# Patient Record
Sex: FEMALE | Race: WHITE | NOT HISPANIC OR LATINO | Employment: FULL TIME | URBAN - METROPOLITAN AREA
[De-identification: names, ages, dates, MRNs, and addresses within clinical notes are randomized per-mention and may not be internally consistent; named-entity substitution may affect disease eponyms.]

---

## 2020-07-07 ENCOUNTER — OFFICE VISIT (OUTPATIENT)
Dept: INTERNAL MEDICINE | Facility: CLINIC | Age: 35
End: 2020-07-07
Payer: COMMERCIAL

## 2020-07-07 VITALS
HEIGHT: 65 IN | BODY MASS INDEX: 25.45 KG/M2 | HEART RATE: 100 BPM | TEMPERATURE: 98 F | WEIGHT: 152.75 LBS | SYSTOLIC BLOOD PRESSURE: 100 MMHG | DIASTOLIC BLOOD PRESSURE: 80 MMHG

## 2020-07-07 DIAGNOSIS — Z00.00 ROUTINE GENERAL MEDICAL EXAMINATION AT A HEALTH CARE FACILITY: Primary | ICD-10-CM

## 2020-07-07 DIAGNOSIS — M54.50 LOW BACK PAIN, UNSPECIFIED BACK PAIN LATERALITY, UNSPECIFIED CHRONICITY, UNSPECIFIED WHETHER SCIATICA PRESENT: ICD-10-CM

## 2020-07-07 DIAGNOSIS — M54.9 BACK PAIN, UNSPECIFIED BACK LOCATION, UNSPECIFIED BACK PAIN LATERALITY, UNSPECIFIED CHRONICITY: ICD-10-CM

## 2020-07-07 PROCEDURE — 90732 PNEUMOCOCCAL POLYSACCHARIDE VACCINE 23-VALENT =>2YO SQ IM: ICD-10-PCS | Mod: S$GLB,,, | Performed by: FAMILY MEDICINE

## 2020-07-07 PROCEDURE — 90472 TDAP VACCINE GREATER THAN OR EQUAL TO 7YO IM: ICD-10-PCS | Mod: S$GLB,,, | Performed by: FAMILY MEDICINE

## 2020-07-07 PROCEDURE — 90472 IMMUNIZATION ADMIN EACH ADD: CPT | Mod: S$GLB,,, | Performed by: FAMILY MEDICINE

## 2020-07-07 PROCEDURE — 90471 PNEUMOCOCCAL POLYSACCHARIDE VACCINE 23-VALENT =>2YO SQ IM: ICD-10-PCS | Mod: S$GLB,,, | Performed by: FAMILY MEDICINE

## 2020-07-07 PROCEDURE — 90715 TDAP VACCINE GREATER THAN OR EQUAL TO 7YO IM: ICD-10-PCS | Mod: S$GLB,,, | Performed by: FAMILY MEDICINE

## 2020-07-07 PROCEDURE — 99999 PR PBB SHADOW E&M-NEW PATIENT-LVL V: CPT | Mod: PBBFAC,,, | Performed by: FAMILY MEDICINE

## 2020-07-07 PROCEDURE — 90715 TDAP VACCINE 7 YRS/> IM: CPT | Mod: S$GLB,,, | Performed by: FAMILY MEDICINE

## 2020-07-07 PROCEDURE — 90732 PPSV23 VACC 2 YRS+ SUBQ/IM: CPT | Mod: S$GLB,,, | Performed by: FAMILY MEDICINE

## 2020-07-07 PROCEDURE — 90471 IMMUNIZATION ADMIN: CPT | Mod: S$GLB,,, | Performed by: FAMILY MEDICINE

## 2020-07-07 PROCEDURE — 99385 PR PREVENTIVE VISIT,NEW,18-39: ICD-10-PCS | Mod: S$GLB,25,, | Performed by: FAMILY MEDICINE

## 2020-07-07 PROCEDURE — 99999 PR PBB SHADOW E&M-NEW PATIENT-LVL V: ICD-10-PCS | Mod: PBBFAC,,, | Performed by: FAMILY MEDICINE

## 2020-07-07 PROCEDURE — 99385 PREV VISIT NEW AGE 18-39: CPT | Mod: S$GLB,25,, | Performed by: FAMILY MEDICINE

## 2020-07-07 RX ORDER — GABAPENTIN 300 MG/1
CAPSULE ORAL
COMMUNITY
Start: 2020-05-12 | End: 2022-05-24 | Stop reason: SDUPTHER

## 2020-07-07 RX ORDER — MELOXICAM 15 MG/1
TABLET ORAL
COMMUNITY
Start: 2020-05-12 | End: 2021-10-18

## 2020-07-07 RX ORDER — CYCLOBENZAPRINE HCL 10 MG
TABLET ORAL DAILY PRN
COMMUNITY
Start: 2020-05-12 | End: 2021-12-10 | Stop reason: SDUPTHER

## 2020-07-07 RX ORDER — TRAMADOL HYDROCHLORIDE 50 MG/1
50 TABLET ORAL DAILY PRN
COMMUNITY
End: 2022-05-24

## 2020-07-07 RX ORDER — TOPIRAMATE 50 MG/1
TABLET, FILM COATED ORAL
COMMUNITY
Start: 2020-04-08 | End: 2020-08-04 | Stop reason: SDUPTHER

## 2020-07-07 RX ORDER — PHENTERMINE HYDROCHLORIDE 37.5 MG/1
37.5 TABLET ORAL
COMMUNITY
End: 2020-10-15 | Stop reason: ALTCHOICE

## 2020-07-07 RX ORDER — HYDROCODONE BITARTRATE AND ACETAMINOPHEN 5; 325 MG/1; MG/1
TABLET ORAL
COMMUNITY
Start: 2020-06-11 | End: 2020-08-04 | Stop reason: ALTCHOICE

## 2020-07-07 NOTE — PROGRESS NOTES
Subjective:       Patient ID: Simon Reynolds is a 34 y.o. female.    Chief Complaint: Establish Care and Referral (gyn,)    Subjective:     Simon Reynolds is a 34 y.o. female and is here for a comprehensive physical exam. The patient reports problems - back pain.    Do you take any herbs or supplements that were not prescribed by a doctor? yes  Are you taking calcium supplements? no  Are you taking aspirin daily? no     History:  Any STD's in the past? none    The following portions of the patient's history were reviewed and updated as appropriate: allergies, current medications, past family history, past medical history, past social history, past surgical history and problem list.    Review of Systems  Do you have pain that bothers you in your daily life? Yes - back  Pertinent items are noted in HPI.     2. Patient Counseling:   --Nutrition: Stressed importance of moderation in sodium/caffeine intake, saturated fat and cholesterol, caloric balance, calcium.  --Exercise: Stressed the importance of regular exercise.   --Substance Abuse: Discussed cessation/primary prevention of tobacco, alcohol -  Pt attempting to stop   --Sexuality: Discussed sexually transmitted disease.  --Injury prevention: Discussed safety belts,  smoke detector.   --Dental health: Discussed dental health.  --Immunizations reviewed.    3. Discussed the patient's BMI with her.  The BMI elevated.  4. Follow up as needed for acute illness        Review of Systems   Constitutional: Negative for fever.   HENT: Negative for congestion.    Eyes: Negative for discharge.   Respiratory: Negative for shortness of breath.    Cardiovascular: Negative for chest pain.   Gastrointestinal: Negative for abdominal pain.   Genitourinary: Positive for dysuria and urgency. Negative for frequency and hematuria.   Musculoskeletal: Negative for joint swelling.   Skin: Negative for rash.   Neurological: Negative for dizziness.   Psychiatric/Behavioral: Negative  for agitation.       Objective:      Physical Exam  Vitals signs and nursing note reviewed.   Constitutional:       General: She is not in acute distress.     Appearance: Normal appearance. She is well-developed. She is not diaphoretic.   HENT:      Head: Normocephalic and atraumatic.   Eyes:      General: No scleral icterus.     Conjunctiva/sclera: Conjunctivae normal.   Cardiovascular:      Rate and Rhythm: Normal rate and regular rhythm.   Pulmonary:      Effort: Pulmonary effort is normal. No respiratory distress.      Breath sounds: Normal breath sounds. No wheezing.   Abdominal:      General: Bowel sounds are normal.      Palpations: Abdomen is soft.      Tenderness: There is no abdominal tenderness. There is no guarding.   Skin:     General: Skin is warm.      Coloration: Skin is not pale.      Findings: No erythema or rash.      Comments: Good turgor   Neurological:      Mental Status: She is alert.   Psychiatric:         Mood and Affect: Mood is anxious.         Assessment:       1. Routine general medical examination at a health care facility    2. Back pain, unspecified back location, unspecified back pain laterality, unspecified chronicity    3. Low back pain, unspecified back pain laterality, unspecified chronicity, unspecified whether sciatica present        Plan:     Problem List Items Addressed This Visit        Orthopedic    Back pain    Relevant Orders    Ambulatory referral/consult to Pain Clinic    Ambulatory referral/consult to Neurosurgery       Other    Routine general medical examination at a health care facility - Primary    Relevant Orders    Ambulatory referral/consult to Obstetrics / Gynecology    Pneumococcal Polysaccharide Vaccine (23 Valent) (SQ/IM)    Tdap Vaccine    CBC auto differential    Comprehensive metabolic panel    Hemoglobin A1C    HIV 1/2 Ag/Ab (4th Gen)    Iron and TIBC    Lipid Panel    TSH    Vitamin D

## 2020-07-08 ENCOUNTER — LAB VISIT (OUTPATIENT)
Dept: LAB | Facility: HOSPITAL | Age: 35
End: 2020-07-08
Attending: FAMILY MEDICINE
Payer: COMMERCIAL

## 2020-07-08 DIAGNOSIS — Z00.00 ROUTINE GENERAL MEDICAL EXAMINATION AT A HEALTH CARE FACILITY: ICD-10-CM

## 2020-07-08 PROCEDURE — 85025 COMPLETE CBC W/AUTO DIFF WBC: CPT

## 2020-07-08 PROCEDURE — 82306 VITAMIN D 25 HYDROXY: CPT

## 2020-07-08 PROCEDURE — 36415 COLL VENOUS BLD VENIPUNCTURE: CPT | Mod: PO

## 2020-07-08 PROCEDURE — 83036 HEMOGLOBIN GLYCOSYLATED A1C: CPT

## 2020-07-08 PROCEDURE — 80061 LIPID PANEL: CPT

## 2020-07-08 PROCEDURE — 86703 HIV-1/HIV-2 1 RESULT ANTBDY: CPT

## 2020-07-08 PROCEDURE — 83540 ASSAY OF IRON: CPT

## 2020-07-08 PROCEDURE — 84443 ASSAY THYROID STIM HORMONE: CPT

## 2020-07-08 PROCEDURE — 80053 COMPREHEN METABOLIC PANEL: CPT

## 2020-07-09 LAB
25(OH)D3+25(OH)D2 SERPL-MCNC: 57 NG/ML (ref 30–96)
ALBUMIN SERPL BCP-MCNC: 4.2 G/DL (ref 3.5–5.2)
ALP SERPL-CCNC: 63 U/L (ref 55–135)
ALT SERPL W/O P-5'-P-CCNC: 13 U/L (ref 10–44)
ANION GAP SERPL CALC-SCNC: 9 MMOL/L (ref 8–16)
AST SERPL-CCNC: 17 U/L (ref 10–40)
BASOPHILS # BLD AUTO: 0.05 K/UL (ref 0–0.2)
BASOPHILS NFR BLD: 0.5 % (ref 0–1.9)
BILIRUB SERPL-MCNC: 0.3 MG/DL (ref 0.1–1)
BUN SERPL-MCNC: 18 MG/DL (ref 6–20)
CALCIUM SERPL-MCNC: 10.5 MG/DL (ref 8.7–10.5)
CHLORIDE SERPL-SCNC: 110 MMOL/L (ref 95–110)
CHOLEST SERPL-MCNC: 209 MG/DL (ref 120–199)
CHOLEST/HDLC SERPL: 4.3 {RATIO} (ref 2–5)
CO2 SERPL-SCNC: 19 MMOL/L (ref 23–29)
CREAT SERPL-MCNC: 1.3 MG/DL (ref 0.5–1.4)
DIFFERENTIAL METHOD: ABNORMAL
EOSINOPHIL # BLD AUTO: 0.1 K/UL (ref 0–0.5)
EOSINOPHIL NFR BLD: 0.9 % (ref 0–8)
ERYTHROCYTE [DISTWIDTH] IN BLOOD BY AUTOMATED COUNT: 13.2 % (ref 11.5–14.5)
EST. GFR  (AFRICAN AMERICAN): >60 ML/MIN/1.73 M^2
EST. GFR  (NON AFRICAN AMERICAN): 53.3 ML/MIN/1.73 M^2
ESTIMATED AVG GLUCOSE: 94 MG/DL (ref 68–131)
GLUCOSE SERPL-MCNC: 73 MG/DL (ref 70–110)
HBA1C MFR BLD HPLC: 4.9 % (ref 4–5.6)
HCT VFR BLD AUTO: 44.7 % (ref 37–48.5)
HDLC SERPL-MCNC: 49 MG/DL (ref 40–75)
HDLC SERPL: 23.4 % (ref 20–50)
HGB BLD-MCNC: 13.8 G/DL (ref 12–16)
HIV 1+2 AB+HIV1 P24 AG SERPL QL IA: NEGATIVE
IMM GRANULOCYTES # BLD AUTO: 0.02 K/UL (ref 0–0.04)
IMM GRANULOCYTES NFR BLD AUTO: 0.2 % (ref 0–0.5)
IRON SERPL-MCNC: 107 UG/DL (ref 30–160)
LDLC SERPL CALC-MCNC: 134.6 MG/DL (ref 63–159)
LYMPHOCYTES # BLD AUTO: 2.6 K/UL (ref 1–4.8)
LYMPHOCYTES NFR BLD: 26.9 % (ref 18–48)
MCH RBC QN AUTO: 27 PG (ref 27–31)
MCHC RBC AUTO-ENTMCNC: 30.9 G/DL (ref 32–36)
MCV RBC AUTO: 87 FL (ref 82–98)
MONOCYTES # BLD AUTO: 0.8 K/UL (ref 0.3–1)
MONOCYTES NFR BLD: 7.9 % (ref 4–15)
NEUTROPHILS # BLD AUTO: 6.2 K/UL (ref 1.8–7.7)
NEUTROPHILS NFR BLD: 63.6 % (ref 38–73)
NONHDLC SERPL-MCNC: 160 MG/DL
NRBC BLD-RTO: 0 /100 WBC
PLATELET # BLD AUTO: 309 K/UL (ref 150–350)
PMV BLD AUTO: 11.9 FL (ref 9.2–12.9)
POTASSIUM SERPL-SCNC: 3.9 MMOL/L (ref 3.5–5.1)
PROT SERPL-MCNC: 7.3 G/DL (ref 6–8.4)
RBC # BLD AUTO: 5.12 M/UL (ref 4–5.4)
SATURATED IRON: 30 % (ref 20–50)
SODIUM SERPL-SCNC: 138 MMOL/L (ref 136–145)
TOTAL IRON BINDING CAPACITY: 358 UG/DL (ref 250–450)
TRANSFERRIN SERPL-MCNC: 242 MG/DL (ref 200–375)
TRIGL SERPL-MCNC: 127 MG/DL (ref 30–150)
TSH SERPL DL<=0.005 MIU/L-ACNC: 2 UIU/ML (ref 0.4–4)
WBC # BLD AUTO: 9.68 K/UL (ref 3.9–12.7)

## 2020-07-10 NOTE — PROGRESS NOTES
Please call pt with abnormal results. Pt does not need appt at this time, unless they have questions or wish to further discuss.  Chol mildly elevated  Change diet to decrease fat and red meats and substitute with leaner cuts of chicken, and /or fish.  Re-check labs in 3 months, if not under control, we can start a statin drug.

## 2020-07-13 ENCOUNTER — TELEPHONE (OUTPATIENT)
Dept: NEUROSURGERY | Facility: CLINIC | Age: 35
End: 2020-07-13

## 2020-07-13 NOTE — TELEPHONE ENCOUNTER
Called pt in reference to appt on tomorrow, pt will need to be reschedule d/t pt doesn't have MRI//ns

## 2020-07-14 ENCOUNTER — TELEPHONE (OUTPATIENT)
Dept: NEUROSURGERY | Facility: CLINIC | Age: 35
End: 2020-07-14

## 2020-07-14 ENCOUNTER — OFFICE VISIT (OUTPATIENT)
Dept: NEUROSURGERY | Facility: CLINIC | Age: 35
End: 2020-07-14
Payer: COMMERCIAL

## 2020-07-14 ENCOUNTER — OFFICE VISIT (OUTPATIENT)
Dept: OBSTETRICS AND GYNECOLOGY | Facility: CLINIC | Age: 35
End: 2020-07-14
Payer: COMMERCIAL

## 2020-07-14 VITALS
BODY MASS INDEX: 26.38 KG/M2 | HEIGHT: 65 IN | SYSTOLIC BLOOD PRESSURE: 98 MMHG | DIASTOLIC BLOOD PRESSURE: 68 MMHG | WEIGHT: 158.31 LBS

## 2020-07-14 VITALS
DIASTOLIC BLOOD PRESSURE: 72 MMHG | WEIGHT: 158 LBS | HEART RATE: 88 BPM | SYSTOLIC BLOOD PRESSURE: 102 MMHG | BODY MASS INDEX: 26.33 KG/M2 | RESPIRATION RATE: 16 BRPM | HEIGHT: 65 IN

## 2020-07-14 DIAGNOSIS — Z00.00 ROUTINE GENERAL MEDICAL EXAMINATION AT A HEALTH CARE FACILITY: ICD-10-CM

## 2020-07-14 DIAGNOSIS — N92.1 MENORRHAGIA WITH IRREGULAR CYCLE: ICD-10-CM

## 2020-07-14 DIAGNOSIS — G96.191 TARLOV CYST: ICD-10-CM

## 2020-07-14 DIAGNOSIS — Z01.419 ENCOUNTER FOR GYNECOLOGICAL EXAMINATION WITHOUT ABNORMAL FINDING: Primary | ICD-10-CM

## 2020-07-14 DIAGNOSIS — N83.202 LEFT OVARIAN CYST: ICD-10-CM

## 2020-07-14 DIAGNOSIS — R30.0 DYSURIA: ICD-10-CM

## 2020-07-14 DIAGNOSIS — M54.50 LOW BACK PAIN, UNSPECIFIED BACK PAIN LATERALITY, UNSPECIFIED CHRONICITY, UNSPECIFIED WHETHER SCIATICA PRESENT: ICD-10-CM

## 2020-07-14 DIAGNOSIS — R33.9 URINARY RETENTION: ICD-10-CM

## 2020-07-14 DIAGNOSIS — M51.36 DEGENERATIVE DISC DISEASE, LUMBAR: Primary | ICD-10-CM

## 2020-07-14 PROCEDURE — 99999 PR PBB SHADOW E&M-EST. PATIENT-LVL III: CPT | Mod: PBBFAC,,, | Performed by: NURSE PRACTITIONER

## 2020-07-14 PROCEDURE — 81002 URINALYSIS NONAUTO W/O SCOPE: CPT | Mod: S$GLB,,, | Performed by: NURSE PRACTITIONER

## 2020-07-14 PROCEDURE — 99999 PR PBB SHADOW E&M-EST. PATIENT-LVL III: ICD-10-PCS | Mod: PBBFAC,,, | Performed by: NURSE PRACTITIONER

## 2020-07-14 PROCEDURE — 87491 CHLMYD TRACH DNA AMP PROBE: CPT

## 2020-07-14 PROCEDURE — 99204 PR OFFICE/OUTPT VISIT, NEW, LEVL IV, 45-59 MIN: ICD-10-PCS | Mod: S$GLB,,, | Performed by: NEUROLOGICAL SURGERY

## 2020-07-14 PROCEDURE — 99385 PR PREVENTIVE VISIT,NEW,18-39: ICD-10-PCS | Mod: 25,S$GLB,, | Performed by: NURSE PRACTITIONER

## 2020-07-14 PROCEDURE — 81002 PR URINALYSIS NONAUTO W/O SCOPE: ICD-10-PCS | Mod: S$GLB,,, | Performed by: NURSE PRACTITIONER

## 2020-07-14 PROCEDURE — 99999 PR PBB SHADOW E&M-EST. PATIENT-LVL III: ICD-10-PCS | Mod: PBBFAC,,, | Performed by: NEUROLOGICAL SURGERY

## 2020-07-14 PROCEDURE — 99385 PREV VISIT NEW AGE 18-39: CPT | Mod: 25,S$GLB,, | Performed by: NURSE PRACTITIONER

## 2020-07-14 PROCEDURE — 88175 CYTOPATH C/V AUTO FLUID REDO: CPT

## 2020-07-14 PROCEDURE — 99204 OFFICE O/P NEW MOD 45 MIN: CPT | Mod: S$GLB,,, | Performed by: NEUROLOGICAL SURGERY

## 2020-07-14 PROCEDURE — 99999 PR PBB SHADOW E&M-EST. PATIENT-LVL III: CPT | Mod: PBBFAC,,, | Performed by: NEUROLOGICAL SURGERY

## 2020-07-14 RX ORDER — OXYCODONE AND ACETAMINOPHEN 5; 325 MG/1; MG/1
TABLET ORAL
COMMUNITY
Start: 2020-07-09 | End: 2021-10-18

## 2020-07-14 NOTE — PATIENT INSTRUCTIONS
Breast Health: Breast Self-Awareness  What is breast self-awareness?  Breast self-awareness is knowing how your breasts normally look and feel. Your breasts change as you go through different stages of your life. So its important to learn what is normal for your breasts. Breast self-awareness helps you notice any changes in your breasts right away. Report any changes to your healthcare provider.  Why is breast self-awareness important?  Many experts now say that women should focus on breast self-awareness instead of doing a breast self-examination (BSE). These experts include the American Cancer Society, the U.S. Preventive Services Task Force, and the American Congress of Obstetricians and Gynecologists. Some experts even advise not teaching women to do a BSE. Thats because research hasnt shown a clear benefit to doing BSEs.  Breast self-awareness is different than a BSE. Breast self-awareness isnt about following a certain method and schedule. Its about knowing what's normal for your breasts. That way you can notice even small changes right away. If you see any changes, report them to your healthcare provider.  Changes to look for  Call your healthcare provider if you find any changes in your breasts that concern you. These changes may include:  · A lump  · Nipple discharge other than breast milk, especially a bloody discharge  · Swelling  · A change in size or shape  · Skin irritation, such as redness, thickening, or dimpling of the skin  · Swollen lymph nodes in the armpit  · Nipple problems, such as pain or redness  If you find a lump  Contact your provider if you find lumpiness in one breast, feel something different in the tissue, or feel a definite lump. Sometimes lumpiness may be due to menstrual changes. But there may be reason for concern.  Your provider may want to see you right away if you have:  · Nipple discharge that is bloody  · Skin changes on your breast, such as dimpling or puckering  Its  normal to be upset if you find a lump. But its important to contact your provider right away. Remember that most breast lumps are benign. This means they are not cancer.  Date Last Reviewed: 8/10/2015  © 9140-5086 [employer]. [address] [place] [address]. All rights reserved. This information is not intended as a substitute for professional medical care. Always follow your healthcare professional's instructions.        Clinical Breast Exam    Many health organizations recommend a yearly clinical breast exam. This exam may be done by a gynecologist, family healthcare provider, nurse practitioner, or specially trained nurse. Yearly breast exams help to make sure that breast conditions are found early.  Your healthcare providers role  A healthcare professional knows the tests and follow-up care needed if a problem is found. Your clinical exam is also a great time to ask questions about breast self-exams. You can find out if youre checking your breasts in the best way. Or you may want to ask how pregnancy, breast implants, or breast reduction surgery affect the way you should check your breasts.  Diagnostic tests  If a clinical exam reveals a breast change, you may have other tests to find out more. These tests may include:  · Mammography. A low-dose X-ray of your breast tissue.  · Ultrasound. An imaging test that uses sound waves to create images of your breast.  · Biopsy. A small amount of breast tissue is removed by needle or by a cut (incision). The tissue is then checked under a microscope.  Guidelines for having clinical breast exams  The American College of Obstetricians and Gynecologists recommends that starting at age 29, you should have a clinical breast exam every 1 to 3 years. After age 40, have a clinical breast exam each year. If youre at higher risk for breast cancer, you may need exams more often. Risk factors for breast cancer may include:  · Being over 50 or  postmenopausal  · Having a family history of breast cancer  · Having the BRCA1 or BRCA2 gene mutation or certain other gene mutations  · Having more menstrual periods due to starting menstruation early (before age 12) or having a late menopause (after age 55)  · Having no pregnancies  · Having a first pregnancy after age 30  · Being obese  · Having a history of radiation treatment to your chest area  · Exposure to MELISSA during your mother's pregnancy  · Not being active  · Drinking too much alcohol  · Having dense breast tissue  · Taking hormone therapy after menopause  Other health organizations have different recommendations. Talk to your healthcare provider about what is best for you.   Date Last Reviewed: 8/13/2015 © 2000-2017 [employer]. [address] [place] [address], [place] [address]. All rights reserved. This information is not intended as a substitute for professional medical care. Always follow your healthcare professional's instructions.        Heavy Menstrual Bleeding    Heavy menstrual bleeding means that your periods are heavier or longer than usual. You may soak through a pad or tampon every 1 to 3 hours on the heaviest days of your period. You may also pass large, dark clots. And your periods may last longer than 7 days.  If you have heavy periods often, this can cause a problem called anemia. With anemia, your red blood cell count is too low. Red blood cells are needed because they help carry oxygen throughout your body. Severe anemia may cause you to look pale and feel weak or tired. You might also become short of breath easily.  There are many possible causes of heavy menstrual bleeding. Hormonal imbalance is the most common cause. Having benign growths in your uterus, such as fibroids or polyps, is another cause. Taking certain medicines or having certain health problems or bleeding disorders are also causes.  To treat heavy menstrual bleeding, medicines are often tried first. If these  dont help, further testing and treatments will likely be needed.  Home care  Medicines  If youre prescribed medicines, be sure to take them as directed.  · To help control heavy bleeding, any of the following may be used:  ¨ Hormone therapy (this includes all methods of hormonal birth control such as pills, shots, cream, ring, patch, or hormone-releasing IUD)  ¨ Nonsteroidal anti-inflammatory drugs (NSAIDs), such as ibuprofen  ¨ Antifibrinolytic medicines, such as transexamic acid  · To help treat anemia, iron supplements may be prescribed.            General care  · Get plenty of rest if you tire easily. Avoid heavy exertion.  · To help relieve pain or cramping, try using a heating pad on the lower belly or back. A warm bath may also help.  Follow-up care  Follow up with your healthcare provider as directed.  When to seek medical advice  Call your healthcare provider right away if any of these occur:  · Heavier bleeding (soaking 1 pad or tampon every hour for 3 hours)  · Heavy bleeding that lasts longer than 1 week  · Fever of 100.4ºF (38ºC) or higher, or as directed by your provider  · Pain or cramping that gets worse instead of better  · Signs of anemia such as pale skin, extreme fatigue or weakness, or shortness of breath  · Dizziness or fainting  Date Last Reviewed: 6/11/2015  © 6051-0668 [email or web address]. [address] [place] [address], [place] [address]. All rights reserved. This information is not intended as a substitute for professional medical care. Always follow your healthcare professional's instructions.        Ovarian Cysts    Ovarian cysts are sacs filled with fluid or tissue that form on or inside the ovaries. The ovaries are two small organs located on each side of a womans uterus (womb). They are part of the female reproductive system.  Ovarian cysts are common in women, especially during childbearing years. There are different types of cysts. Most are harmless (benign) and go away on their  own. They often cause no symptoms. If symptoms do occur, they can include mild pain or pressure in the lower belly (abdomen).  Cysts that are large or break (rupture) may cause more severe pain and symptoms. In these cases, hospital care or treatment such as surgery may be needed. More extensive treatment may also be needed if a cyst causes an ovary to twist (called torsion) or if a cyst is suspected to be cancerous. Keep in mind that most cysts are not cancerous, however.  General care  · To help relieve pain, your healthcare provider may recommend using over-the-counter pain medicine. If needed, stronger pain medicine may be prescribed.  · Depending on the type of cyst you have, your healthcare provider may advise taking birth control pills. These help shrink cysts in certain cases. They may also help prevent new cysts from forming. Be sure to take these medicines as directed if they are prescribed.  · Your healthcare provider may advise you to watch your symptoms over time to see if they go away or worsen. Regular ultrasound tests may also be advised. These can help check if a cyst goes away or grows in size.  Follow-up care  Follow up with your healthcare provider, or as advised.  When to seek medical advice  Call your healthcare provider right away if any of these occur:  · Pain worsens or fails to get better with home treatment  · Fever of 100.4°F (38°C) or higher (or other fever amount directed by your healthcare provider)  · Nausea and vomiting  · Weakness, dizziness, or fainting  · Abnormal vaginal bleeding  Date Last Reviewed: 6/11/2015  © 4724-2516 [employer]. [address] [place] [address], [place] [address]. All rights reserved. This information is not intended as a substitute for professional medical care. Always follow your healthcare professional's instructions.        Understanding Ovarian Cysts  An ovarian cyst is a fluid-filled sac that forms on or inside an ovary. The ovaries are a pair of  small, oval-shaped organs in the lower part of a womans belly (abdomen). About once a month, one of the ovaries releases an egg. The ovaries also make the hormones estrogen and progesterone. These hormones are part of pregnancy, the menstrual cycle, and breast growth.  Ovarian cysts are very common in women of all ages. Young girls can also get them, but this is less common. There are different types of ovarian cysts. They can occur for various reasons, and they may need different treatments. A cyst can vary in size from half an inch to more than 4 inches.  Types of ovarian cysts  There are different types of ovarian cysts:  Functional cyst  This is the most common type of ovarian cyst. They only occur in women who havent gone through menopause. There are 2 types of functional cysts:  · Follicular cyst. This cyst happens when an egg isnt released and it keeps growing inside the ovary.  · Corpus luteum cyst. This type of cyst occurs when the sac around the egg doesnt dissolve after the egg is released.  Endometrioma  This is a cyst filled with old blood and tissue from the lining of the uterus. They are often called chocolate cysts because of their dark color. They can happen in women with endometriosis.  Dermoid cyst  This cyst develops from ovarian cells and eggs. They may have hair, skin, or fat in them. These cysts are common in women of childbearing age.  What causes ovarian cysts?  Cysts can also be caused by:  · Polycystic ovary syndrome (PCOS), a condition that causes multiple cysts on the ovaries  · Pregnancy  · Severe pelvic infection, such as chlamydia  · Noncancerous growths  · Cancer (rare)  · Using fertility medicine to cause ovulation, such as clomiphene  Symptoms of an ovarian cyst  Many women dont have any symptoms from the cyst. In women with symptoms, the most common is pain or pressure in your lower belly on the side of the cyst. This pain may be dull or sharp, and it may come and go. A cyst  that breaks open (ruptures) may lead to sudden, sharp pain.  Other symptoms of an ovarian cyst can include:  · Pain in the lower back or thighs  · Trouble emptying your bladder fully  · Pain during sex  · Weight gain  · Pain during your period  · Breast tenderness  · Abnormal vaginal bleeding (rare)  Diagnosing an ovarian cyst  Your primary care doctor or an obstetrics and gynecology (OB/GYN) doctor may diagnose the condition. Your doctor will ask about your health history and your symptoms. You will also have a physical exam. This will likely include a pelvic exam. During the pelvic exam, your doctor may feel the swelling on your ovary. In women with no symptoms, this is often the first sign of a cyst.  If your doctor thinks you may have an ovarian cyst, you may need tests. These can help your doctor learn the type of cyst. Tests can also help rule out other problems, such as an ectopic pregnancy. The tests may include:  · Ultrasound. This test uses sound waves to view the size, shape, and location of the cyst. The test can also show if the growth is solid or filled with fluid.  · MRI. This uses large magnets and a computer to create a detailed picture of the area.  · Pregnancy test. This is done to check if pregnancy may be the cause of the cyst.  · Blood tests. These check for hormone problems and cancer. They also check if the cyst is bleeding.  · Biopsy. This is a test where a tiny piece of the ovary is taken. The piece is examined in a lab for cancer cells. This may be done if an ultrasound shows a certain type of growth on the ovary.  Date Last Reviewed: 5/6/2015 © 2000-2017 [employer]. [address] [place] [address], [place] [address]. All rights reserved. This information is not intended as a substitute for professional medical care. Always follow your healthcare professional's instructions.        The Range of Pap Test Results  When your Pap test is sent to the lab, the lab studies your cell  samples and reports any abnormal cell changes. Your health care provider can discuss these changes with you. In some cases, an abnormal Pap test is due to an infection. More serious cell changes range from dysplasia to cancer. Talk to your health care provider about your Pap test.    Normal results  Cervical cells, even normal ones, are always changing. As they mature, normal squamous cells move from deeper layers within the cervix. Over time, these cells flatten and cover the surface of the cervix. Within the cervical canal, the cells are different. These glandular cells are taller and not as flat as the cells on the surface of the cervix. When a Pap test sample shows healthy cells of both types, the results are negative. Keep having Pap tests as often as directed.  Abnormal results  A positive Pap test result means some cells in the sample showed abnormal changes. These results are grouped by the type of cell change and the location, or extent, of the changes. Depending on the results, you may need further testing.  · Inflammation: Noncancerous changes are present. They may be due to normal cell repair. Or, they may be caused by an infection, such as HPV or yeast. Further testing may be needed. (Also called reactive cellular changes.)  · Atypical squamous cells: Test results are unclear. Cells on the surface of the cervix show changes, but their significance is not yet known. Testing for HPV and other sexually transmitted infections (STIs) may be needed. Treatment may be required. (Reported as ASC-US or ASC-H.)  · Atypical glandular cells: Cells lining the cervical canal show abnormal changes. Further testing is likely. You may also have treatment to destroy or remove problem cells. (Reported as AGC.)  · Mild dysplasia: Cells show distinct changes. More testing or HPV typing may be done. You may also have treatment to destroy or remove problem cells. (Reported as low-grade LA or DELIA 1.)  · Moderate to severe  dysplasia: Cells show precancerous changes. Or, noninvasive cancer (carcinoma in situ) may be present. Treatment to destroy or remove problem cells is likely. (Reported as high-grade LA or DELIA 2 or DELIA 3.)  · Cancer: Different types of cancer may be detected by your Pap test. More tests to assess the cancer's extent are likely. The type of treatment will depend on the test results and other factors, such as age and health history. (Reported as squamous cell carcinoma, endocervical adenocarcinoma in situ, or adenocarcinoma.)  Date Last Reviewed: 5/12/2015  © 6330-8146 [employer]. [address] [place] [address], [place] [address]. All rights reserved. This information is not intended as a substitute for professional medical care. Always follow your healthcare professional's instructions.

## 2020-07-14 NOTE — LETTER
July 14, 2020      Darion Cross MD  [address] [address] [address]           [facility]  [address] [facility] [address]  Phone: [phone]  Fax: [phone]          Patient: Simon Reynolds   MR Number: [id number]   YOB: 1985   Date of Visit: 7/14/2020       Dear Dr. Darion Cross:    Thank you for referring Simon Reynolds to me for evaluation. Attached you will find relevant portions of my assessment and plan of care.    If you have questions, please do not hesitate to call me. I look forward to following Simon Reynolds along with you.    Sincerely,    Walter Mendez MD    Enclosure  CC:  No Recipients    If you would like to receive this communication electronically, please contact [email or web address] or [phone] to request more information on [employer] access.    For providers and/or their staff who would like to refer a patient to [facility], please contact us through our one-stop-shop provider referral line, [facility] , at [phone].    If you feel you have received this communication in error or would no longer like to receive these types of communications, please e-mail [email or web address]

## 2020-07-14 NOTE — PROGRESS NOTES
CC: Well woman exam    Simon Reynolds is a 34 y.o. female  presents for well woman exam.  LMP: Patient's last menstrual period was 2020..    Numerous complaints most unrelated to gyn -  Has a back injury from work from 2019  Had urinary issues that started with this injury that has yet to be addressed- she states she always feels some retention and difficulty with urination  Gyn related does have heavy cycles and pain which she states has always had but has seemed to worsen  She had an MRI of her back and showed a cyst to left ovary on 2020 - pt concerned this is causing her urine issues  States can not take ocp due to use of Topamax for her epilepsy - is considering BTL         Past Medical History:   Diagnosis Date    Breast mass     Epilepsy     History of alcohol abuse     History of drug abuse     Migraine     Ovarian cyst     Scoliosis     Spine curvature, acquired     Spine disorder      Past Surgical History:   Procedure Laterality Date    EPIDURAL STEROID INJECTION      REPAIR OF HIATAL HERNIA USING MESH      SURGICAL REMOVAL OF MASS OF UPPER EXTREMITY      right breast     Social History     Socioeconomic History    Marital status:      Spouse name: 2    Number of children: Not on file    Years of education: Not on file    Highest education level: Not on file   Occupational History    Occupation: industrial shipping    Social Needs    Financial resource strain: Not on file    Food insecurity     Worry: Not on file     Inability: Not on file    Transportation needs     Medical: Not on file     Non-medical: Not on file   Tobacco Use    Smoking status: Current Every Day Smoker     Types: Cigarettes    Smokeless tobacco: Never Used   Substance and Sexual Activity    Alcohol use: Not Currently     Comment: sober 6 years    Drug use: Not Currently     Types: Benzodiazepines, Methamphetamines     Comment: recovery 6 years    Sexual activity: Yes      "Partners: Male     Birth control/protection: None   Lifestyle    Physical activity     Days per week: Not on file     Minutes per session: Not on file    Stress: Not on file   Relationships    Social connections     Talks on phone: Not on file     Gets together: Not on file     Attends [employer] service: Not on file     Active member of club or organization: Not on file     Attends meetings of clubs or organizations: Not on file     Relationship status: Not on file   Other Topics Concern    Not on file   Social History Narrative    Daughter of current patient maryuri Reynolds, she recently moved here after being hurt on job in [facility]     Family History   Problem Relation Age of Onset    Alcohol abuse Mother     Drug abuse Mother     Cancer Mother         bilat breast     OB History        4    Para   2    Term   2            AB   2    Living   2       SAB   1    TAB        Ectopic        Multiple        Live Births   2                 BP 98/68   Ht 5' 5" (1.651 m)   Wt 71.8 kg (158 lb 4.6 oz)   LMP 2020   BMI 26.34 kg/m²       ROS:  GENERAL: Denies weight gain or weight loss. Feeling well overall.   SKIN: Denies rash or lesions.   HEAD: Denies head injury or headache.   NODES: Denies enlarged lymph nodes.   CHEST: Denies chest pain or shortness of breath.   CARDIOVASCULAR: Denies palpitations or left sided chest pain.   ABDOMEN: No abdominal pain, constipation, diarrhea, nausea, vomiting or rectal bleeding.   URINARY: No frequency, dysuria, hematuria, or burning on urination.  REPRODUCTIVE: See HPI.   BREASTS: The patient performs breast self-examination and denies pain, lumps, or nipple discharge.   HEMATOLOGIC: No easy bruisability or excessive bleeding.   MUSCULOSKELETAL: Denies joint pain or swelling.   NEUROLOGIC: Denies syncope or weakness.   PSYCHIATRIC: Denies depression, anxiety or mood swings.    PHYSICAL EXAM:  APPEARANCE: Well nourished, well developed, in no acute " distress.  AFFECT: WNL, alert and oriented x 3  SKIN: No acne or hirsutism  NECK: Neck symmetric without masses or thyromegaly  NODES: No inguinal, cervical, axillary, or femoral lymph node enlargement  CHEST: Good respiratory effect  ABDOMEN: Soft.  No tenderness or masses.  No hepatosplenomegaly.  No hernias.  BREASTS: Symmetrical, no skin changes or visible lesions.  No palpable masses, nipple discharge bilaterally.  PELVIC: Normal external genitalia without lesions.  Normal hair distribution.  Adequate perineal body, normal urethral meatus.  Vagina moist and well rugated without lesions or discharge.  Cervix pink, without lesions, discharge or tenderness.  No significant cystocele or rectocele.  Bimanual exam shows uterus to be normal size, regular, mobile and nontender.  Adnexa without masses or tenderness.    EXTREMITIES: No edema.  Physical Exam    1. Encounter for gynecological examination without abnormal finding  Liquid-Based Pap Smear, Screening   2. Routine general medical examination at a health care facility  Ambulatory referral/consult to Obstetrics / Gynecology   3. Dysuria  POCT urine dipstick without microscope   4. Urinary retention  POCT urine dipstick without microscope   5. Left ovarian cyst  US Pelvis Comp with Transvag NON-OB (xpd   6. Menorrhagia with irregular cycle  US Pelvis Comp with Transvag NON-OB (xpd    AND PLAN:    Patient was counseled today on A.C.S. Pap guidelines and recommendations for yearly pelvic exams, mammograms and monthly self breast exams; to see her PCP for other health maintenance.     Labs reviewed from July 2020 - normal  Check pelvic u/s   UA dip negative in office today  Will have her f/u for her bleeding issues and wants to discuss BTL  With gyn MD

## 2020-07-14 NOTE — PROGRESS NOTES
Subjective:      Patient ID: Simon Reynolds is a 34 y.o. female.    Chief Complaint: Lumbar Spine Pain (L-Spine) (LBP )    Patient here for eval of lower back pain   Since October of last year she has had event causing acute onset of back pain   Pain currently 5/10 across back   Ambulates unassisted   No Physical therapy for this   Takes Percocet and Tramadol for symptom relief   No previous spine surgery   Had an MIKE x 2 without any relief   She states that she is currently involved in a workman's comp case for this which is involved in legal case for    Is scheduled for an SI joint injection by her pain physician  MR of lumbar spine showed likely ovarian cyst which is being worked up for today in another department   MRI of the lumbar spine also made mention of a Tarlov cyst and she wanted to have an evaluation by neurosurgeon to make sure this was not surgical  Denies any pain numbness or tingling or any weakness down the legs        Review of Systems   Constitutional: Negative for activity change, appetite change and chills.   HENT: Negative for hearing loss, sore throat and tinnitus.    Eyes: Negative for pain, discharge and itching.   Cardiovascular: Negative for chest pain.   Gastrointestinal: Negative for abdominal pain.   Endocrine: Negative for cold intolerance and heat intolerance.   Genitourinary: Negative for difficulty urinating and dysuria.   Musculoskeletal: Positive for back pain and gait problem.   Allergic/Immunologic: Negative for environmental allergies.   Neurological: Positive for weakness. Negative for dizziness, tremors, light-headedness and headaches.   Hematological: Negative for adenopathy.   Psychiatric/Behavioral: Negative for agitation, behavioral problems and confusion.         Objective:       Neurosurgery Physical Exam  Ortho/SPM Exam    Nursing note and vitals reviewed  Gen:Oriented to person, place, and time.             Appears stated age   Skin: no rashes or lesions  identified   Head:Normocephalic and atraumatic.  Nose: Nose normal.    Eyes: EOM are normal. Pupils are equal, round, and reactive to light.   Neck: Neck supple. No masses or lesions palpated  Cardiovascular: Intact distal pulses.    Abdominal: Soft.   Neurological: Alert and oriented to person, place, and time.  No cranial nerve deficit.  Coordination normal. Normal muscle tone  Psychiatric: Normal mood and affect. Behavior is normal.      Back:  None  Paraspinal muscle spasms   None  Pain with flexion and extention   WNL  Range of motion    Neg  Straight leg raise     Motor   Right Right Left Left  Level Group   5  5  L2 Hip flexor (Psoas)   5  5  L3 Leg extension (Quads)   5  5  L4 Dorsiflexion & foot inversion (Tibialis Anterior)   5  5  L5 Great toe extension ( EHL)   5  5  S1 Foot eversion (Gastroc, PL & PB)     Sensation  NL Decreased (R/L/BL) Level Sensation    X  L2 Anterio-medial thigh   X  L3 Medial thigh around knee   X  L4 Medial foot   X  L5 Dorsum foot   X  S1 Lateral foot     Reflex  2+  Patellar tendon (L4)   2+  Achilles tendon (S1)     MRI shows Tarlov cyst at the level approximately T12  There are mild degenerative changes however no major foraminal or central stenosis lumbar spine is identified    The MRI of the cervical spine shows mild straightening of the cervical curve.  There is no major central or foraminal cervical stenosis identified    There is mention of a ovarian cyst which again she is being worked up for with an ultrasound today  .  Assessment:     1. Degenerative disc disease, lumbar    2. Low back pain, unspecified back pain laterality, unspecified chronicity, unspecified whether sciatica present    3. Tarlov cyst      Plan:     Low back pain, unspecified back pain laterality, unspecified chronicity, unspecified whether sciatica present      Patient is mild degenerative changes into the lumbar spine  Incidental finding of Tarlov cyst  Explained this is not surgical  No  neurosurgical recommendations at this time  Can start outpatient physical therapy for range of motion and strengthening of the lumbar spine    Thank you for the referral   Please call with any questions    Walter Mendez MD  Neurosurgery     Disclaimer: This note was prepared using a voice recognition system and is likely to have sound alike errors within the text.

## 2020-07-14 NOTE — LETTER
July 14, 2020      Darion Cross MD  [address] [address] [address]           [facility]  [address] [facility] [address]  Phone: [phone]  Fax: [phone]          Patient: Simon Reynolds   MR Number: [id number]   YOB: 1985   Date of Visit: 7/14/2020       Dear Dr. Darion Cross:    Thank you for referring Simon Reynolds to me for evaluation. Attached you will find relevant portions of my assessment and plan of care.    If you have questions, please do not hesitate to call me. I look forward to following Simon Reynolds along with you.    Sincerely,    Stacey Zurita, NP    Enclosure  CC:  No Recipients    If you would like to receive this communication electronically, please contact [email or web address] or [phone] to request more information on [facility] access.    For providers and/or their staff who would like to refer a patient to [facility], please contact us through our one-stop-shop provider referral line, [facility] , at [phone].    If you feel you have received this communication in error or would no longer like to receive these types of communications, please e-mail [email or web address]

## 2020-07-15 ENCOUNTER — HOSPITAL ENCOUNTER (OUTPATIENT)
Dept: RADIOLOGY | Facility: HOSPITAL | Age: 35
Discharge: HOME | End: 2020-07-15
Attending: NURSE PRACTITIONER
Payer: COMMERCIAL

## 2020-07-15 DIAGNOSIS — N83.202 LEFT OVARIAN CYST: ICD-10-CM

## 2020-07-15 DIAGNOSIS — N92.1 MENORRHAGIA WITH IRREGULAR CYCLE: ICD-10-CM

## 2020-07-15 PROCEDURE — 76830 TRANSVAGINAL US NON-OB: CPT | Mod: 26,,, | Performed by: RADIOLOGY

## 2020-07-15 PROCEDURE — 76830 TRANSVAGINAL US NON-OB: CPT | Mod: TC,PO

## 2020-07-15 PROCEDURE — 76856 US EXAM PELVIC COMPLETE: CPT | Mod: 26,,, | Performed by: RADIOLOGY

## 2020-07-15 PROCEDURE — 76856 US PELVIS COMP WITH TRANSVAG NON-OB (XPD): ICD-10-PCS | Mod: 26,,, | Performed by: RADIOLOGY

## 2020-07-15 PROCEDURE — 76830 US PELVIS COMP WITH TRANSVAG NON-OB (XPD): ICD-10-PCS | Mod: 26,,, | Performed by: RADIOLOGY

## 2020-07-16 LAB
C TRACH DNA SPEC QL NAA+PROBE: NOT DETECTED
N GONORRHOEA DNA SPEC QL NAA+PROBE: NOT DETECTED

## 2020-07-23 LAB
FINAL PATHOLOGIC DIAGNOSIS: NORMAL
Lab: NORMAL

## 2020-08-04 ENCOUNTER — OFFICE VISIT (OUTPATIENT)
Dept: INTERNAL MEDICINE | Facility: CLINIC | Age: 35
End: 2020-08-04
Payer: COMMERCIAL

## 2020-08-04 ENCOUNTER — LAB VISIT (OUTPATIENT)
Dept: LAB | Facility: HOSPITAL | Age: 35
End: 2020-08-04
Attending: FAMILY MEDICINE
Payer: COMMERCIAL

## 2020-08-04 VITALS
WEIGHT: 155.44 LBS | DIASTOLIC BLOOD PRESSURE: 76 MMHG | SYSTOLIC BLOOD PRESSURE: 122 MMHG | TEMPERATURE: 98 F | BODY MASS INDEX: 26.54 KG/M2 | HEIGHT: 64 IN | HEART RATE: 80 BPM

## 2020-08-04 DIAGNOSIS — E66.3 OVERWEIGHT (BMI 25.0-29.9): ICD-10-CM

## 2020-08-04 DIAGNOSIS — Z11.59 ENCOUNTER FOR HEPATITIS C VIRUS SCREENING TEST FOR HIGH RISK PATIENT: ICD-10-CM

## 2020-08-04 DIAGNOSIS — M54.50 LOW BACK PAIN, UNSPECIFIED BACK PAIN LATERALITY, UNSPECIFIED CHRONICITY, UNSPECIFIED WHETHER SCIATICA PRESENT: Primary | ICD-10-CM

## 2020-08-04 DIAGNOSIS — G43.909 MIGRAINE WITHOUT STATUS MIGRAINOSUS, NOT INTRACTABLE, UNSPECIFIED MIGRAINE TYPE: ICD-10-CM

## 2020-08-04 DIAGNOSIS — Z91.89 ENCOUNTER FOR HEPATITIS C VIRUS SCREENING TEST FOR HIGH RISK PATIENT: ICD-10-CM

## 2020-08-04 PROCEDURE — 99999 PR PBB SHADOW E&M-EST. PATIENT-LVL IV: CPT | Mod: PBBFAC,,, | Performed by: FAMILY MEDICINE

## 2020-08-04 PROCEDURE — 99999 PR PBB SHADOW E&M-EST. PATIENT-LVL IV: ICD-10-PCS | Mod: PBBFAC,,, | Performed by: FAMILY MEDICINE

## 2020-08-04 PROCEDURE — 36415 COLL VENOUS BLD VENIPUNCTURE: CPT | Mod: PO

## 2020-08-04 PROCEDURE — 86803 HEPATITIS C AB TEST: CPT

## 2020-08-04 PROCEDURE — 99214 PR OFFICE/OUTPT VISIT, EST, LEVL IV, 30-39 MIN: ICD-10-PCS | Mod: S$GLB,,, | Performed by: FAMILY MEDICINE

## 2020-08-04 PROCEDURE — 99214 OFFICE O/P EST MOD 30 MIN: CPT | Mod: S$GLB,,, | Performed by: FAMILY MEDICINE

## 2020-08-04 RX ORDER — TOPIRAMATE 50 MG/1
50 TABLET, FILM COATED ORAL DAILY
Qty: 90 TABLET | Refills: 0 | Status: SHIPPED | OUTPATIENT
Start: 2020-08-04 | End: 2020-08-27

## 2020-08-04 NOTE — PROGRESS NOTES
Subjective:       Patient ID: Simon Reynolds is a 34 y.o. female.    Chief Complaint: Back Pain (follow up )    Back Pain  This is a chronic problem. The current episode started more than 1 year ago. The problem occurs constantly. The problem has been gradually worsening since onset. The pain is present in the lumbar spine. The quality of the pain is described as aching, cramping, stabbing and burning. The pain radiates to the left foot and right foot. The pain is severe. The symptoms are aggravated by bending, position and twisting. Pertinent negatives include no abdominal pain or chest pain. Treatments tried: pain meds, muscle relaxers.     Review of Systems   Respiratory: Negative for shortness of breath.    Cardiovascular: Negative for chest pain.   Gastrointestinal: Negative for abdominal pain.   Musculoskeletal: Positive for arthralgias and back pain.       Objective:      Physical Exam  Vitals signs and nursing note reviewed.   Constitutional:       General: She is not in acute distress.     Appearance: Normal appearance. She is well-developed. She is not diaphoretic.   HENT:      Head: Normocephalic and atraumatic.      Nose: Nose normal.   Pulmonary:      Effort: Pulmonary effort is normal. No respiratory distress.      Breath sounds: Normal breath sounds. No wheezing.   Skin:     General: Skin is warm and dry.      Findings: No erythema or rash.   Neurological:      Mental Status: She is alert.   Psychiatric:         Mood and Affect: Mood normal.         Behavior: Behavior normal.         Thought Content: Thought content normal.         Judgment: Judgment normal.         Assessment:       1. Low back pain, unspecified back pain laterality, unspecified chronicity, unspecified whether sciatica present    2. Encounter for hepatitis C virus screening test for high risk patient    3. Overweight (BMI 25.0-29.9)    4. Migraine without status migrainosus, not intractable, unspecified migraine type        Plan:      Problem List Items Addressed This Visit        Neuro    Migraines    Relevant Medications    topiramate (TOPAMAX) 50 MG tablet       ID    Encounter for hepatitis C virus screening test for high risk patient    Relevant Orders    Hepatitis C Antibody       Endocrine    Overweight (BMI 25.0-29.9)    Current Assessment & Plan     Do not eat starches. (nothing white)  Stop sugary snacks,  Stop bottled juices, or sodas.  See if you can lose any weight by stopping these items first, then we will re-visit the issue.  Glycemic Index Diet Handout given to pt with explanation.              Orthopedic    Back pain - Primary    Relevant Orders    Ambulatory referral/consult to Neurosurgery

## 2020-08-05 LAB — HCV AB SERPL QL IA: POSITIVE

## 2020-08-06 DIAGNOSIS — R76.8 HEPATITIS C ANTIBODY TEST POSITIVE: Primary | ICD-10-CM

## 2020-08-06 NOTE — PROGRESS NOTES
Please call pt with abnormal results. Pt does not need appt at this time, unless they have questions or wish to further discuss.  Will place GI ref.

## 2020-08-07 ENCOUNTER — TELEPHONE (OUTPATIENT)
Dept: GASTROENTEROLOGY | Facility: CLINIC | Age: 35
End: 2020-08-07

## 2020-08-07 NOTE — TELEPHONE ENCOUNTER
----- Message from Sasha Rockwell sent at 8/7/2020 10:13 AM CDT -----  Regarding: pt  Contact: pt  Returning a call to office for an appt.   Pt was referred to gastro by Dr Cross.  Pt lab work came back positive for hep C but she states she has the antiobodies and not the virus. Pt can be reached at [phone].

## 2020-08-27 ENCOUNTER — TELEPHONE (OUTPATIENT)
Dept: OBSTETRICS AND GYNECOLOGY | Facility: CLINIC | Age: 35
End: 2020-08-27

## 2020-08-27 DIAGNOSIS — G43.909 MIGRAINE WITHOUT STATUS MIGRAINOSUS, NOT INTRACTABLE, UNSPECIFIED MIGRAINE TYPE: ICD-10-CM

## 2020-08-27 RX ORDER — TOPIRAMATE 100 MG/1
100 TABLET, FILM COATED ORAL 2 TIMES DAILY
Qty: 180 TABLET | Refills: 0 | Status: SHIPPED | OUTPATIENT
Start: 2020-08-27 | End: 2020-10-14 | Stop reason: SDUPTHER

## 2020-08-27 RX ORDER — TOPIRAMATE 50 MG/1
50 TABLET, FILM COATED ORAL 2 TIMES DAILY
Qty: 180 TABLET | Refills: 0 | Status: CANCELLED | OUTPATIENT
Start: 2020-08-27

## 2020-08-27 NOTE — TELEPHONE ENCOUNTER
Called patient and rescheduled appointment at her request. Informed her of date time and location. Pt. Voiced understanding.

## 2020-08-27 NOTE — TELEPHONE ENCOUNTER
Spoke with patient states that she is need of a refill on Topamax. Rx for Topamax 50 mg was sent to pharmacy on 8/4 for 90 tablets. Pt states that she is currently out. Pt states that she takes 150 mg in the morning and 150 mg at night. Please advise.

## 2020-08-27 NOTE — TELEPHONE ENCOUNTER
----- Message from Suzanne Rucker sent at 8/27/2020  3:06 PM CDT -----  Regarding: Appt  Contact: [phone]   Would like to consult with the nurse, patient missed her Appt with her Ultrasound, patient would like to reschedule her Appt to be seen in the office, please call back , thanks sj

## 2020-09-22 ENCOUNTER — OFFICE VISIT (OUTPATIENT)
Dept: OBSTETRICS AND GYNECOLOGY | Facility: CLINIC | Age: 35
End: 2020-09-22
Payer: COMMERCIAL

## 2020-09-22 VITALS
DIASTOLIC BLOOD PRESSURE: 72 MMHG | SYSTOLIC BLOOD PRESSURE: 118 MMHG | BODY MASS INDEX: 27.09 KG/M2 | WEIGHT: 157.88 LBS

## 2020-09-22 DIAGNOSIS — D21.9 FIBROIDS: Primary | ICD-10-CM

## 2020-09-22 PROCEDURE — 3008F BODY MASS INDEX DOCD: CPT | Mod: CPTII,S$GLB,, | Performed by: OBSTETRICS & GYNECOLOGY

## 2020-09-22 PROCEDURE — 99999 PR PBB SHADOW E&M-EST. PATIENT-LVL III: ICD-10-PCS | Mod: PBBFAC,,, | Performed by: OBSTETRICS & GYNECOLOGY

## 2020-09-22 PROCEDURE — 99999 PR PBB SHADOW E&M-EST. PATIENT-LVL III: CPT | Mod: PBBFAC,,, | Performed by: OBSTETRICS & GYNECOLOGY

## 2020-09-22 PROCEDURE — 99213 PR OFFICE/OUTPT VISIT, EST, LEVL III, 20-29 MIN: ICD-10-PCS | Mod: S$GLB,,, | Performed by: OBSTETRICS & GYNECOLOGY

## 2020-09-22 PROCEDURE — 3008F PR BODY MASS INDEX (BMI) DOCUMENTED: ICD-10-PCS | Mod: CPTII,S$GLB,, | Performed by: OBSTETRICS & GYNECOLOGY

## 2020-09-22 PROCEDURE — 99213 OFFICE O/P EST LOW 20 MIN: CPT | Mod: S$GLB,,, | Performed by: OBSTETRICS & GYNECOLOGY

## 2020-09-22 NOTE — PROGRESS NOTES
Subjective:       Patient ID: Simon Reynolds is a 34 y.o. female.    Chief Complaint:  Follow-up      History of Present Illness  HPI  Presents to follow-up pelvic ultrasound.  Pt saw Stacey Jones this summer for [employer], and mentioned that a MRI to evaluate a back injury showed a lime sized cyst on her left ovary.  Stacey ordered an ultrasound, which shows normal ovaries bilateraly and 2 small uterine fibroids (2.4cm and 1.1cm).  At that visit, she had been having irregular, heavy periods, but the last several months, her periods are now monthly.  They last 2-3 days.  Has some heavy flow the first 1-2 days, but otherwise normal flow.  Also has pelvic pressure.  Has constant low back pain that gets worse during her period, but thinks this is more related to her back injury.  Continues to follow-up with her doctor in [place] for that.  Has been having issues with her bladder, and plans to see a urologist soon.      GYN & OB History  Patient's last menstrual period was 2020 (approximate).   Date of Last Pap: No result found    OB History    Para Term  AB Living   4 2 2   2 2   SAB TAB Ectopic Multiple Live Births   1       2      # Outcome Date GA Lbr Sen/2nd Weight Sex Delivery Anes PTL Lv   4 AB 2008           3 Term 2006    M  OTHER, None  TAMEKA   2 SAB 2005           1 Term 2004    M Vag-Spont OTHER, EPI  TAMEKA       Review of Systems  Review of Systems   Constitutional: Negative for fatigue, fever and unexpected weight change.   Gastrointestinal: Negative for abdominal pain, constipation, diarrhea, nausea and vomiting.   Genitourinary: Positive for dysmenorrhea. Negative for dysuria, frequency, menorrhagia, menstrual problem, urgency, vaginal bleeding, vaginal discharge, vaginal pain, postcoital bleeding and vaginal odor.   Musculoskeletal: Positive for back pain.           Objective:    Physical Exam:   Constitutional: She is oriented to person, place, and time. She appears well-developed and  well-nourished. No distress.                           Neurological: She is alert and oriented to person, place, and time.    Skin: No rash noted. No erythema. No pallor.    Psychiatric: She has a normal mood and affect. Her behavior is normal. Judgment and thought content normal.          Assessment:        1. Fibroids                Plan:      Simon was seen today for follow-up.    Diagnoses and all orders for this visit:    Fibroids    Reviewed normal ovaries on ultrasound.  Cyst seen on MRI no longer present.  Discussed pathophysiology of fibroids.  Hers are very small and are asymptomatic and not causing any problems for her at this time.  Advised her to continue to follow-up with her doctor regarding her back injury, and keep f/u with urology.  RTC prn.

## 2020-10-14 RX ORDER — TOPIRAMATE 100 MG/1
50 TABLET, FILM COATED ORAL 2 TIMES DAILY
Qty: 90 TABLET | Refills: 1 | Status: SHIPPED | OUTPATIENT
Start: 2020-10-14 | End: 2020-10-15 | Stop reason: SDUPTHER

## 2020-10-14 NOTE — TELEPHONE ENCOUNTER
----- Message from Gin Mcclain sent at 10/14/2020 10:08 AM CDT -----  Regarding: Refill  Pt is calling to speak with Staff regarding a medication refill of topiramate (TOPAMAX) 100 MG tablet, twice daily.    She can be reached at [phone].    Thank you.

## 2020-10-15 ENCOUNTER — OFFICE VISIT (OUTPATIENT)
Dept: INTERNAL MEDICINE | Facility: CLINIC | Age: 35
End: 2020-10-15
Payer: COMMERCIAL

## 2020-10-15 VITALS
SYSTOLIC BLOOD PRESSURE: 124 MMHG | HEART RATE: 80 BPM | HEIGHT: 64 IN | TEMPERATURE: 98 F | WEIGHT: 155.44 LBS | DIASTOLIC BLOOD PRESSURE: 84 MMHG | RESPIRATION RATE: 20 BRPM | BODY MASS INDEX: 26.54 KG/M2

## 2020-10-15 DIAGNOSIS — K59.03 THERAPEUTIC OPIOID-INDUCED CONSTIPATION (OIC): ICD-10-CM

## 2020-10-15 DIAGNOSIS — G43.909 MIGRAINE WITHOUT STATUS MIGRAINOSUS, NOT INTRACTABLE, UNSPECIFIED MIGRAINE TYPE: Primary | ICD-10-CM

## 2020-10-15 DIAGNOSIS — T40.2X5A THERAPEUTIC OPIOID-INDUCED CONSTIPATION (OIC): ICD-10-CM

## 2020-10-15 DIAGNOSIS — R76.8 HEPATITIS C ANTIBODY TEST POSITIVE: ICD-10-CM

## 2020-10-15 DIAGNOSIS — M54.50 LOW BACK PAIN, UNSPECIFIED BACK PAIN LATERALITY, UNSPECIFIED CHRONICITY, UNSPECIFIED WHETHER SCIATICA PRESENT: ICD-10-CM

## 2020-10-15 DIAGNOSIS — Z91.89 ENCOUNTER FOR HEPATITIS C VIRUS SCREENING TEST FOR HIGH RISK PATIENT: ICD-10-CM

## 2020-10-15 DIAGNOSIS — Z11.59 ENCOUNTER FOR HEPATITIS C VIRUS SCREENING TEST FOR HIGH RISK PATIENT: ICD-10-CM

## 2020-10-15 DIAGNOSIS — M54.9 BACK PAIN, UNSPECIFIED BACK LOCATION, UNSPECIFIED BACK PAIN LATERALITY, UNSPECIFIED CHRONICITY: ICD-10-CM

## 2020-10-15 DIAGNOSIS — E53.8 B12 DEFICIENCY: ICD-10-CM

## 2020-10-15 PROBLEM — K59.00 CONSTIPATION: Status: ACTIVE | Noted: 2020-10-15

## 2020-10-15 PROCEDURE — 3008F PR BODY MASS INDEX (BMI) DOCUMENTED: ICD-10-PCS | Mod: CPTII,S$GLB,, | Performed by: FAMILY MEDICINE

## 2020-10-15 PROCEDURE — 99214 OFFICE O/P EST MOD 30 MIN: CPT | Mod: 25,S$GLB,, | Performed by: FAMILY MEDICINE

## 2020-10-15 PROCEDURE — 99999 PR PBB SHADOW E&M-EST. PATIENT-LVL V: ICD-10-PCS | Mod: PBBFAC,,, | Performed by: FAMILY MEDICINE

## 2020-10-15 PROCEDURE — 96372 THER/PROPH/DIAG INJ SC/IM: CPT | Mod: S$GLB,,, | Performed by: FAMILY MEDICINE

## 2020-10-15 PROCEDURE — 99214 PR OFFICE/OUTPT VISIT, EST, LEVL IV, 30-39 MIN: ICD-10-PCS | Mod: 25,S$GLB,, | Performed by: FAMILY MEDICINE

## 2020-10-15 PROCEDURE — 96372 PR INJECTION,THERAP/PROPH/DIAG2ST, IM OR SUBCUT: ICD-10-PCS | Mod: S$GLB,,, | Performed by: FAMILY MEDICINE

## 2020-10-15 PROCEDURE — 99999 PR PBB SHADOW E&M-EST. PATIENT-LVL V: CPT | Mod: PBBFAC,,, | Performed by: FAMILY MEDICINE

## 2020-10-15 PROCEDURE — 3008F BODY MASS INDEX DOCD: CPT | Mod: CPTII,S$GLB,, | Performed by: FAMILY MEDICINE

## 2020-10-15 RX ORDER — TOPIRAMATE 100 MG/1
50 TABLET, FILM COATED ORAL 2 TIMES DAILY
Qty: 90 TABLET | Refills: 1 | Status: SHIPPED | OUTPATIENT
Start: 2020-10-15 | End: 2021-09-23

## 2020-10-15 RX ORDER — CYANOCOBALAMIN 1000 UG/ML
1000 INJECTION, SOLUTION INTRAMUSCULAR; SUBCUTANEOUS
Status: COMPLETED | OUTPATIENT
Start: 2020-10-15 | End: 2020-10-15

## 2020-10-15 RX ORDER — TOPIRAMATE 100 MG/1
50 TABLET, FILM COATED ORAL 2 TIMES DAILY
Qty: 90 TABLET | Refills: 1 | Status: CANCELLED | OUTPATIENT
Start: 2020-10-15 | End: 2021-04-13

## 2020-10-15 RX ADMIN — CYANOCOBALAMIN 1000 MCG: 1000 INJECTION, SOLUTION INTRAMUSCULAR; SUBCUTANEOUS at 09:10

## 2020-10-15 NOTE — PROGRESS NOTES
Subjective:       Patient ID: Simon Reynolds is a 34 y.o. female.    Chief Complaint: Hyperlipidemia (3 month follow up )    History of b12 def, uses sublingual b12 currently    Headache   This is a chronic problem. The current episode started more than 1 year ago. The problem occurs constantly. The problem has been gradually worsening. The pain is located in the occipital region. The pain does not radiate. The pain quality is not similar to prior headaches. The quality of the pain is described as aching. The pain is moderate. Associated symptoms include nausea, phonophobia, photophobia, scalp tenderness and weakness. Pertinent negatives include no abdominal pain, fever, hearing loss or tingling. The symptoms are aggravated by bright light and noise. Treatments tried: topamax. The treatment provided moderate relief.     Review of Systems   Constitutional: Negative for fever.   HENT: Negative for hearing loss.    Eyes: Positive for photophobia.   Respiratory: Negative for shortness of breath.    Cardiovascular: Negative for chest pain.   Gastrointestinal: Positive for nausea. Negative for abdominal pain.   Neurological: Positive for weakness and headaches. Negative for tingling.       Objective:      Physical Exam  Vitals signs and nursing note reviewed.   Constitutional:       General: She is not in acute distress.     Appearance: Normal appearance. She is well-developed. She is not diaphoretic.   HENT:      Head: Normocephalic and atraumatic.      Nose: Nose normal.   Pulmonary:      Effort: Pulmonary effort is normal. No respiratory distress.      Breath sounds: Normal breath sounds. No wheezing.   Skin:     General: Skin is warm and dry.      Findings: No erythema or rash.   Neurological:      General: No focal deficit present.      Mental Status: She is alert and oriented to person, place, and time. Mental status is at baseline.   Psychiatric:         Mood and Affect: Mood normal.         Behavior: Behavior  normal.         Thought Content: Thought content normal.         Judgment: Judgment normal.         Assessment:       1. Migraine without status migrainosus, not intractable, unspecified migraine type    2. Low back pain, unspecified back pain laterality, unspecified chronicity, unspecified whether sciatica present    3. Encounter for hepatitis C virus screening test for high risk patient    4. Hepatitis C antibody test positive    5. Back pain, unspecified back location, unspecified back pain laterality, unspecified chronicity    6. Therapeutic opioid-induced constipation (OIC)    7. B12 deficiency        Plan:     Problem List Items Addressed This Visit        Neuro    Migraines - Primary    Relevant Medications    topiramate (TOPAMAX) 100 MG tablet    Other Relevant Orders    Ambulatory referral/consult to Neurology       ID    RESOLVED: Encounter for hepatitis C virus screening test for high risk patient       Immunology/Multi System    Hepatitis C antibody test positive    Relevant Orders    Ambulatory referral/consult to Gastroenterology       Endocrine    B12 deficiency    Relevant Medications    cyanocobalamin injection 1,000 mcg (Start on 10/15/2020  9:30 AM)       GI    Therapeutic opioid-induced constipation (OIC)    Relevant Medications    linaCLOtide (LINZESS) 72 mcg Cap capsule       Orthopedic    Back pain    Current Assessment & Plan     Pt saw abdon Redd second opinion         Relevant Orders    Ambulatory referral/consult to Pain Clinic    Ambulatory referral/consult to Orthopedics

## 2020-10-21 ENCOUNTER — PATIENT OUTREACH (OUTPATIENT)
Dept: ADMINISTRATIVE | Facility: OTHER | Age: 35
End: 2020-10-21

## 2021-04-28 ENCOUNTER — PATIENT MESSAGE (OUTPATIENT)
Dept: RESEARCH | Facility: HOSPITAL | Age: 36
End: 2021-04-28

## 2021-07-16 ENCOUNTER — TELEPHONE (OUTPATIENT)
Dept: INTERNAL MEDICINE | Facility: CLINIC | Age: 36
End: 2021-07-16

## 2021-09-20 DIAGNOSIS — G43.909 MIGRAINE WITHOUT STATUS MIGRAINOSUS, NOT INTRACTABLE, UNSPECIFIED MIGRAINE TYPE: ICD-10-CM

## 2021-09-20 RX ORDER — TOPIRAMATE 100 MG/1
TABLET, FILM COATED ORAL
Qty: 90 TABLET | Refills: 1 | OUTPATIENT
Start: 2021-09-20

## 2021-10-17 ENCOUNTER — PATIENT OUTREACH (OUTPATIENT)
Dept: ADMINISTRATIVE | Facility: OTHER | Age: 36
End: 2021-10-17

## 2021-10-18 ENCOUNTER — PATIENT MESSAGE (OUTPATIENT)
Dept: OBSTETRICS AND GYNECOLOGY | Facility: CLINIC | Age: 36
End: 2021-10-18

## 2021-10-18 ENCOUNTER — OFFICE VISIT (OUTPATIENT)
Dept: OBSTETRICS AND GYNECOLOGY | Facility: CLINIC | Age: 36
End: 2021-10-18
Payer: COMMERCIAL

## 2021-10-18 ENCOUNTER — TELEPHONE (OUTPATIENT)
Dept: RADIOLOGY | Facility: HOSPITAL | Age: 36
End: 2021-10-18

## 2021-10-18 VITALS
WEIGHT: 162.25 LBS | SYSTOLIC BLOOD PRESSURE: 114 MMHG | HEIGHT: 64 IN | DIASTOLIC BLOOD PRESSURE: 80 MMHG | BODY MASS INDEX: 27.7 KG/M2

## 2021-10-18 DIAGNOSIS — N92.0 MENORRHAGIA WITH REGULAR CYCLE: Primary | ICD-10-CM

## 2021-10-18 PROCEDURE — 1160F RVW MEDS BY RX/DR IN RCRD: CPT | Mod: CPTII,S$GLB,, | Performed by: NURSE PRACTITIONER

## 2021-10-18 PROCEDURE — 3074F SYST BP LT 130 MM HG: CPT | Mod: CPTII,S$GLB,, | Performed by: NURSE PRACTITIONER

## 2021-10-18 PROCEDURE — 1160F PR REVIEW ALL MEDS BY PRESCRIBER/CLIN PHARMACIST DOCUMENTED: ICD-10-PCS | Mod: CPTII,S$GLB,, | Performed by: NURSE PRACTITIONER

## 2021-10-18 PROCEDURE — 99999 PR PBB SHADOW E&M-EST. PATIENT-LVL III: CPT | Mod: PBBFAC,,, | Performed by: NURSE PRACTITIONER

## 2021-10-18 PROCEDURE — 3008F PR BODY MASS INDEX (BMI) DOCUMENTED: ICD-10-PCS | Mod: CPTII,S$GLB,, | Performed by: NURSE PRACTITIONER

## 2021-10-18 PROCEDURE — 3008F BODY MASS INDEX DOCD: CPT | Mod: CPTII,S$GLB,, | Performed by: NURSE PRACTITIONER

## 2021-10-18 PROCEDURE — 1159F PR MEDICATION LIST DOCUMENTED IN MEDICAL RECORD: ICD-10-PCS | Mod: CPTII,S$GLB,, | Performed by: NURSE PRACTITIONER

## 2021-10-18 PROCEDURE — 99999 PR PBB SHADOW E&M-EST. PATIENT-LVL III: ICD-10-PCS | Mod: PBBFAC,,, | Performed by: NURSE PRACTITIONER

## 2021-10-18 PROCEDURE — 99214 OFFICE O/P EST MOD 30 MIN: CPT | Mod: S$GLB,,, | Performed by: NURSE PRACTITIONER

## 2021-10-18 PROCEDURE — 3079F PR MOST RECENT DIASTOLIC BLOOD PRESSURE 80-89 MM HG: ICD-10-PCS | Mod: CPTII,S$GLB,, | Performed by: NURSE PRACTITIONER

## 2021-10-18 PROCEDURE — 1159F MED LIST DOCD IN RCRD: CPT | Mod: CPTII,S$GLB,, | Performed by: NURSE PRACTITIONER

## 2021-10-18 PROCEDURE — 3079F DIAST BP 80-89 MM HG: CPT | Mod: CPTII,S$GLB,, | Performed by: NURSE PRACTITIONER

## 2021-10-18 PROCEDURE — 3074F PR MOST RECENT SYSTOLIC BLOOD PRESSURE < 130 MM HG: ICD-10-PCS | Mod: CPTII,S$GLB,, | Performed by: NURSE PRACTITIONER

## 2021-10-18 PROCEDURE — 99214 PR OFFICE/OUTPT VISIT, EST, LEVL IV, 30-39 MIN: ICD-10-PCS | Mod: S$GLB,,, | Performed by: NURSE PRACTITIONER

## 2021-10-19 ENCOUNTER — HOSPITAL ENCOUNTER (OUTPATIENT)
Dept: RADIOLOGY | Facility: HOSPITAL | Age: 36
Discharge: HOME | End: 2021-10-19
Attending: NURSE PRACTITIONER
Payer: COMMERCIAL

## 2021-10-19 DIAGNOSIS — N92.0 MENORRHAGIA WITH REGULAR CYCLE: ICD-10-CM

## 2021-10-19 PROCEDURE — 76856 US EXAM PELVIC COMPLETE: CPT | Mod: TC

## 2021-10-19 PROCEDURE — 76856 US EXAM PELVIC COMPLETE: CPT | Mod: 26,,, | Performed by: RADIOLOGY

## 2021-10-19 PROCEDURE — 76856 US PELVIS COMP WITH TRANSVAG NON-OB (XPD): ICD-10-PCS | Mod: 26,,, | Performed by: RADIOLOGY

## 2021-10-19 PROCEDURE — 76830 US PELVIS COMP WITH TRANSVAG NON-OB (XPD): ICD-10-PCS | Mod: 26,,, | Performed by: RADIOLOGY

## 2021-10-19 PROCEDURE — 76830 TRANSVAGINAL US NON-OB: CPT | Mod: 26,,, | Performed by: RADIOLOGY

## 2021-11-01 ENCOUNTER — OFFICE VISIT (OUTPATIENT)
Dept: OBSTETRICS AND GYNECOLOGY | Facility: CLINIC | Age: 36
End: 2021-11-01
Payer: COMMERCIAL

## 2021-11-01 VITALS
WEIGHT: 164.88 LBS | SYSTOLIC BLOOD PRESSURE: 94 MMHG | HEIGHT: 64 IN | BODY MASS INDEX: 28.15 KG/M2 | DIASTOLIC BLOOD PRESSURE: 68 MMHG

## 2021-11-01 DIAGNOSIS — D21.9 FIBROIDS: Primary | ICD-10-CM

## 2021-11-01 PROCEDURE — 99213 PR OFFICE/OUTPT VISIT, EST, LEVL III, 20-29 MIN: ICD-10-PCS | Mod: S$GLB,,, | Performed by: OBSTETRICS & GYNECOLOGY

## 2021-11-01 PROCEDURE — 1160F PR REVIEW ALL MEDS BY PRESCRIBER/CLIN PHARMACIST DOCUMENTED: ICD-10-PCS | Mod: CPTII,S$GLB,, | Performed by: OBSTETRICS & GYNECOLOGY

## 2021-11-01 PROCEDURE — 99999 PR PBB SHADOW E&M-EST. PATIENT-LVL III: CPT | Mod: PBBFAC,,, | Performed by: OBSTETRICS & GYNECOLOGY

## 2021-11-01 PROCEDURE — 1160F RVW MEDS BY RX/DR IN RCRD: CPT | Mod: CPTII,S$GLB,, | Performed by: OBSTETRICS & GYNECOLOGY

## 2021-11-01 PROCEDURE — 3008F BODY MASS INDEX DOCD: CPT | Mod: CPTII,S$GLB,, | Performed by: OBSTETRICS & GYNECOLOGY

## 2021-11-01 PROCEDURE — 3008F PR BODY MASS INDEX (BMI) DOCUMENTED: ICD-10-PCS | Mod: CPTII,S$GLB,, | Performed by: OBSTETRICS & GYNECOLOGY

## 2021-11-01 PROCEDURE — 3078F PR MOST RECENT DIASTOLIC BLOOD PRESSURE < 80 MM HG: ICD-10-PCS | Mod: CPTII,S$GLB,, | Performed by: OBSTETRICS & GYNECOLOGY

## 2021-11-01 PROCEDURE — 3074F SYST BP LT 130 MM HG: CPT | Mod: CPTII,S$GLB,, | Performed by: OBSTETRICS & GYNECOLOGY

## 2021-11-01 PROCEDURE — 1159F MED LIST DOCD IN RCRD: CPT | Mod: CPTII,S$GLB,, | Performed by: OBSTETRICS & GYNECOLOGY

## 2021-11-01 PROCEDURE — 99213 OFFICE O/P EST LOW 20 MIN: CPT | Mod: S$GLB,,, | Performed by: OBSTETRICS & GYNECOLOGY

## 2021-11-01 PROCEDURE — 3078F DIAST BP <80 MM HG: CPT | Mod: CPTII,S$GLB,, | Performed by: OBSTETRICS & GYNECOLOGY

## 2021-11-01 PROCEDURE — 99999 PR PBB SHADOW E&M-EST. PATIENT-LVL III: ICD-10-PCS | Mod: PBBFAC,,, | Performed by: OBSTETRICS & GYNECOLOGY

## 2021-11-01 PROCEDURE — 1159F PR MEDICATION LIST DOCUMENTED IN MEDICAL RECORD: ICD-10-PCS | Mod: CPTII,S$GLB,, | Performed by: OBSTETRICS & GYNECOLOGY

## 2021-11-01 PROCEDURE — 3074F PR MOST RECENT SYSTOLIC BLOOD PRESSURE < 130 MM HG: ICD-10-PCS | Mod: CPTII,S$GLB,, | Performed by: OBSTETRICS & GYNECOLOGY

## 2021-11-01 RX ORDER — MELATONIN 5 MG
5 CAPSULE ORAL NIGHTLY
COMMUNITY

## 2021-11-02 ENCOUNTER — TELEPHONE (OUTPATIENT)
Dept: OBSTETRICS AND GYNECOLOGY | Facility: CLINIC | Age: 36
End: 2021-11-02
Payer: COMMERCIAL

## 2021-11-03 ENCOUNTER — TELEPHONE (OUTPATIENT)
Dept: OBSTETRICS AND GYNECOLOGY | Facility: CLINIC | Age: 36
End: 2021-11-03
Payer: COMMERCIAL

## 2021-11-03 DIAGNOSIS — N92.0 MENORRHAGIA WITH REGULAR CYCLE: ICD-10-CM

## 2021-11-03 DIAGNOSIS — Z01.818 PREOP TESTING: ICD-10-CM

## 2021-11-03 DIAGNOSIS — D21.9 FIBROIDS: Primary | ICD-10-CM

## 2021-11-26 ENCOUNTER — PATIENT OUTREACH (OUTPATIENT)
Dept: ADMINISTRATIVE | Facility: OTHER | Age: 36
End: 2021-11-26
Payer: COMMERCIAL

## 2021-11-30 ENCOUNTER — TELEPHONE (OUTPATIENT)
Dept: OBSTETRICS AND GYNECOLOGY | Facility: CLINIC | Age: 36
End: 2021-11-30
Payer: COMMERCIAL

## 2021-12-01 ENCOUNTER — PATIENT MESSAGE (OUTPATIENT)
Dept: SURGERY | Facility: HOSPITAL | Age: 36
End: 2021-12-01
Payer: COMMERCIAL

## 2021-12-01 ENCOUNTER — OFFICE VISIT (OUTPATIENT)
Dept: OBSTETRICS AND GYNECOLOGY | Facility: CLINIC | Age: 36
End: 2021-12-01
Payer: COMMERCIAL

## 2021-12-01 VITALS
BODY MASS INDEX: 28.19 KG/M2 | HEIGHT: 64 IN | WEIGHT: 165.13 LBS | SYSTOLIC BLOOD PRESSURE: 110 MMHG | DIASTOLIC BLOOD PRESSURE: 84 MMHG

## 2021-12-01 DIAGNOSIS — D21.9 FIBROIDS: Primary | ICD-10-CM

## 2021-12-01 DIAGNOSIS — G43.909 MIGRAINE WITHOUT STATUS MIGRAINOSUS, NOT INTRACTABLE, UNSPECIFIED MIGRAINE TYPE: ICD-10-CM

## 2021-12-01 DIAGNOSIS — R10.30 LOWER ABDOMINAL PAIN: ICD-10-CM

## 2021-12-01 PROCEDURE — 99999 PR PBB SHADOW E&M-EST. PATIENT-LVL III: CPT | Mod: PBBFAC,,, | Performed by: OBSTETRICS & GYNECOLOGY

## 2021-12-01 PROCEDURE — 99212 OFFICE O/P EST SF 10 MIN: CPT | Mod: S$GLB,,, | Performed by: OBSTETRICS & GYNECOLOGY

## 2021-12-01 PROCEDURE — 99212 PR OFFICE/OUTPT VISIT, EST, LEVL II, 10-19 MIN: ICD-10-PCS | Mod: S$GLB,,, | Performed by: OBSTETRICS & GYNECOLOGY

## 2021-12-01 PROCEDURE — 99999 PR PBB SHADOW E&M-EST. PATIENT-LVL III: ICD-10-PCS | Mod: PBBFAC,,, | Performed by: OBSTETRICS & GYNECOLOGY

## 2021-12-01 RX ORDER — NAPROXEN 500 MG/1
500 TABLET ORAL 2 TIMES DAILY PRN
Qty: 30 TABLET | Refills: 2 | Status: SHIPPED | OUTPATIENT
Start: 2021-12-01 | End: 2022-06-28

## 2021-12-01 RX ORDER — ACETAMINOPHEN AND CODEINE PHOSPHATE 120; 12 MG/5ML; MG/5ML
1 SOLUTION ORAL DAILY
Qty: 28 TABLET | Refills: 11 | Status: SHIPPED | OUTPATIENT
Start: 2021-12-01 | End: 2021-12-01 | Stop reason: SDUPTHER

## 2021-12-01 RX ORDER — NAPROXEN 500 MG/1
500 TABLET ORAL 2 TIMES DAILY PRN
Qty: 30 TABLET | Refills: 2 | Status: SHIPPED | OUTPATIENT
Start: 2021-12-01 | End: 2021-12-01 | Stop reason: SDUPTHER

## 2021-12-01 RX ORDER — ACETAMINOPHEN AND CODEINE PHOSPHATE 120; 12 MG/5ML; MG/5ML
1 SOLUTION ORAL DAILY
Qty: 28 TABLET | Refills: 11 | Status: SHIPPED | OUTPATIENT
Start: 2021-12-01 | End: 2021-12-09 | Stop reason: ALTCHOICE

## 2021-12-02 RX ORDER — TOPIRAMATE 100 MG/1
100 TABLET, FILM COATED ORAL DAILY
Qty: 14 TABLET | Refills: 0 | Status: SHIPPED | OUTPATIENT
Start: 2021-12-02 | End: 2021-12-10

## 2021-12-09 ENCOUNTER — PATIENT MESSAGE (OUTPATIENT)
Dept: GASTROENTEROLOGY | Facility: CLINIC | Age: 36
End: 2021-12-09

## 2021-12-10 ENCOUNTER — OFFICE VISIT (OUTPATIENT)
Dept: INTERNAL MEDICINE | Facility: CLINIC | Age: 36
End: 2021-12-10
Payer: COMMERCIAL

## 2021-12-10 ENCOUNTER — LAB VISIT (OUTPATIENT)
Dept: LAB | Facility: HOSPITAL | Age: 36
End: 2021-12-10
Attending: FAMILY MEDICINE
Payer: COMMERCIAL

## 2021-12-10 VITALS
WEIGHT: 167.56 LBS | OXYGEN SATURATION: 98 % | HEART RATE: 95 BPM | TEMPERATURE: 99 F | SYSTOLIC BLOOD PRESSURE: 100 MMHG | DIASTOLIC BLOOD PRESSURE: 80 MMHG | BODY MASS INDEX: 28.76 KG/M2

## 2021-12-10 DIAGNOSIS — Z86.19 HISTORY OF CHLAMYDIA: ICD-10-CM

## 2021-12-10 DIAGNOSIS — Z00.00 ROUTINE GENERAL MEDICAL EXAMINATION AT A HEALTH CARE FACILITY: Primary | ICD-10-CM

## 2021-12-10 DIAGNOSIS — M54.9 BACK PAIN, UNSPECIFIED BACK LOCATION, UNSPECIFIED BACK PAIN LATERALITY, UNSPECIFIED CHRONICITY: ICD-10-CM

## 2021-12-10 DIAGNOSIS — G40.909 NONINTRACTABLE EPILEPSY WITHOUT STATUS EPILEPTICUS, UNSPECIFIED EPILEPSY TYPE: ICD-10-CM

## 2021-12-10 DIAGNOSIS — E53.8 B12 DEFICIENCY: ICD-10-CM

## 2021-12-10 DIAGNOSIS — Z00.00 ROUTINE GENERAL MEDICAL EXAMINATION AT A HEALTH CARE FACILITY: ICD-10-CM

## 2021-12-10 DIAGNOSIS — G43.909 MIGRAINE WITHOUT STATUS MIGRAINOSUS, NOT INTRACTABLE, UNSPECIFIED MIGRAINE TYPE: ICD-10-CM

## 2021-12-10 LAB
25(OH)D3+25(OH)D2 SERPL-MCNC: 33 NG/ML (ref 30–96)
ALBUMIN SERPL BCP-MCNC: 3.7 G/DL (ref 3.5–5.2)
ALP SERPL-CCNC: 69 U/L (ref 55–135)
ALT SERPL W/O P-5'-P-CCNC: 25 U/L (ref 10–44)
ANION GAP SERPL CALC-SCNC: 15 MMOL/L (ref 8–16)
AST SERPL-CCNC: 19 U/L (ref 10–40)
BILIRUB SERPL-MCNC: 0.2 MG/DL (ref 0.1–1)
BUN SERPL-MCNC: 12 MG/DL (ref 6–20)
CALCIUM SERPL-MCNC: 8.7 MG/DL (ref 8.7–10.5)
CHLORIDE SERPL-SCNC: 108 MMOL/L (ref 95–110)
CHOLEST SERPL-MCNC: 263 MG/DL (ref 120–199)
CHOLEST/HDLC SERPL: 5.5 {RATIO} (ref 2–5)
CO2 SERPL-SCNC: 16 MMOL/L (ref 23–29)
CREAT SERPL-MCNC: 0.8 MG/DL (ref 0.5–1.4)
EST. GFR  (AFRICAN AMERICAN): >60 ML/MIN/1.73 M^2
EST. GFR  (NON AFRICAN AMERICAN): >60 ML/MIN/1.73 M^2
ESTIMATED AVG GLUCOSE: 94 MG/DL (ref 68–131)
FERRITIN SERPL-MCNC: 18 NG/ML (ref 20–300)
GLUCOSE SERPL-MCNC: 82 MG/DL (ref 70–110)
HBA1C MFR BLD: 4.9 % (ref 4–5.6)
HDLC SERPL-MCNC: 48 MG/DL (ref 40–75)
HDLC SERPL: 18.3 % (ref 20–50)
IRON SERPL-MCNC: 153 UG/DL (ref 30–160)
LDLC SERPL CALC-MCNC: 167.4 MG/DL (ref 63–159)
NONHDLC SERPL-MCNC: 215 MG/DL
POTASSIUM SERPL-SCNC: 3.8 MMOL/L (ref 3.5–5.1)
PROT SERPL-MCNC: 6.8 G/DL (ref 6–8.4)
SATURATED IRON: 35 % (ref 20–50)
SODIUM SERPL-SCNC: 139 MMOL/L (ref 136–145)
TOTAL IRON BINDING CAPACITY: 443 UG/DL (ref 250–450)
TRANSFERRIN SERPL-MCNC: 299 MG/DL (ref 200–375)
TRIGL SERPL-MCNC: 238 MG/DL (ref 30–150)
TSH SERPL DL<=0.005 MIU/L-ACNC: 2.25 UIU/ML (ref 0.4–4)

## 2021-12-10 PROCEDURE — 96372 THER/PROPH/DIAG INJ SC/IM: CPT | Mod: S$GLB,,, | Performed by: FAMILY MEDICINE

## 2021-12-10 PROCEDURE — 36415 COLL VENOUS BLD VENIPUNCTURE: CPT | Mod: PO | Performed by: FAMILY MEDICINE

## 2021-12-10 PROCEDURE — 83036 HEMOGLOBIN GLYCOSYLATED A1C: CPT | Performed by: FAMILY MEDICINE

## 2021-12-10 PROCEDURE — 82306 VITAMIN D 25 HYDROXY: CPT | Performed by: FAMILY MEDICINE

## 2021-12-10 PROCEDURE — 99395 PR PREVENTIVE VISIT,EST,18-39: ICD-10-PCS | Mod: 25,S$GLB,, | Performed by: FAMILY MEDICINE

## 2021-12-10 PROCEDURE — 99395 PREV VISIT EST AGE 18-39: CPT | Mod: 25,S$GLB,, | Performed by: FAMILY MEDICINE

## 2021-12-10 PROCEDURE — 99999 PR PBB SHADOW E&M-EST. PATIENT-LVL III: CPT | Mod: PBBFAC,,, | Performed by: FAMILY MEDICINE

## 2021-12-10 PROCEDURE — 80061 LIPID PANEL: CPT | Performed by: FAMILY MEDICINE

## 2021-12-10 PROCEDURE — 84443 ASSAY THYROID STIM HORMONE: CPT | Performed by: FAMILY MEDICINE

## 2021-12-10 PROCEDURE — 80053 COMPREHEN METABOLIC PANEL: CPT | Performed by: FAMILY MEDICINE

## 2021-12-10 PROCEDURE — 96372 PR INJECTION,THERAP/PROPH/DIAG2ST, IM OR SUBCUT: ICD-10-PCS | Mod: S$GLB,,, | Performed by: FAMILY MEDICINE

## 2021-12-10 PROCEDURE — 84466 ASSAY OF TRANSFERRIN: CPT | Performed by: FAMILY MEDICINE

## 2021-12-10 PROCEDURE — 99999 PR PBB SHADOW E&M-EST. PATIENT-LVL III: ICD-10-PCS | Mod: PBBFAC,,, | Performed by: FAMILY MEDICINE

## 2021-12-10 PROCEDURE — 82728 ASSAY OF FERRITIN: CPT | Performed by: FAMILY MEDICINE

## 2021-12-10 RX ORDER — TOPIRAMATE 100 MG/1
100 TABLET, FILM COATED ORAL 2 TIMES DAILY
Qty: 180 TABLET | Refills: 3 | Status: SHIPPED | OUTPATIENT
Start: 2021-12-10 | End: 2021-12-14

## 2021-12-10 RX ORDER — GUAIFENESIN 1200 MG
TABLET, EXTENDED RELEASE 12 HR ORAL
COMMUNITY

## 2021-12-10 RX ORDER — TOPIRAMATE 50 MG/1
TABLET, FILM COATED ORAL
COMMUNITY
End: 2021-12-10

## 2021-12-10 RX ORDER — KETOROLAC TROMETHAMINE 30 MG/ML
60 INJECTION, SOLUTION INTRAMUSCULAR; INTRAVENOUS
Status: COMPLETED | OUTPATIENT
Start: 2021-12-10 | End: 2021-12-10

## 2021-12-10 RX ORDER — CYANOCOBALAMIN 1000 UG/ML
1000 INJECTION, SOLUTION INTRAMUSCULAR; SUBCUTANEOUS
Status: COMPLETED | OUTPATIENT
Start: 2021-12-10 | End: 2021-12-10

## 2021-12-10 RX ORDER — CYCLOBENZAPRINE HCL 10 MG
10 TABLET ORAL NIGHTLY
Qty: 30 TABLET | Refills: 1 | Status: SHIPPED | OUTPATIENT
Start: 2021-12-10 | End: 2022-05-24 | Stop reason: SDUPTHER

## 2021-12-10 RX ORDER — KETOROLAC TROMETHAMINE 10 MG/1
TABLET, FILM COATED ORAL
COMMUNITY
End: 2021-12-10

## 2021-12-10 RX ADMIN — CYANOCOBALAMIN 1000 MCG: 1000 INJECTION, SOLUTION INTRAMUSCULAR; SUBCUTANEOUS at 08:12

## 2021-12-10 RX ADMIN — KETOROLAC TROMETHAMINE 60 MG: 30 INJECTION, SOLUTION INTRAMUSCULAR; INTRAVENOUS at 08:12

## 2021-12-13 ENCOUNTER — TELEPHONE (OUTPATIENT)
Dept: INTERNAL MEDICINE | Facility: CLINIC | Age: 36
End: 2021-12-13
Payer: COMMERCIAL

## 2021-12-14 DIAGNOSIS — G43.909 MIGRAINE WITHOUT STATUS MIGRAINOSUS, NOT INTRACTABLE, UNSPECIFIED MIGRAINE TYPE: ICD-10-CM

## 2021-12-14 DIAGNOSIS — G40.909 NONINTRACTABLE EPILEPSY WITHOUT STATUS EPILEPTICUS, UNSPECIFIED EPILEPSY TYPE: ICD-10-CM

## 2021-12-14 RX ORDER — TOPIRAMATE 100 MG/1
TABLET, FILM COATED ORAL
Qty: 90 TABLET | Refills: 1 | Status: SHIPPED | OUTPATIENT
Start: 2021-12-14 | End: 2022-05-24

## 2022-03-14 PROBLEM — Z00.00 ROUTINE GENERAL MEDICAL EXAMINATION AT A HEALTH CARE FACILITY: Status: RESOLVED | Noted: 2021-12-10 | Resolved: 2022-03-14

## 2022-05-02 NOTE — TELEPHONE ENCOUNTER
Spoke with patient who states that she is unavailable and would need to call back in 30 minutes. Verbalized understanding with patient.    Severe sepsis with septic shock

## 2022-05-11 ENCOUNTER — TELEPHONE (OUTPATIENT)
Dept: INTERNAL MEDICINE | Facility: CLINIC | Age: 37
End: 2022-05-11
Payer: MEDICAID

## 2022-05-11 NOTE — TELEPHONE ENCOUNTER
----- Message from Juan Luis Bowen sent at 5/11/2022 11:49 AM CDT -----  Contact: Simon  .Type:  Sooner Apoointment Request    Caller is requesting a sooner appointment.  Caller declined first available appointment listed below.  Caller will not accept being placed on the waitlist and is requesting a message be sent to doctor.  Name of Caller: Simon  When is the first available appointment?unknown   Symptoms: annual exam   Would the patient rather a call back or a response via [facility]? call  Best Call Back Number: [phone]  Additional Information: mom reports per Dr Cross patient may establish care. Please give patient a call back to schedule patient same time as son (new patient) as well.  Thanks,  RP

## 2022-05-24 ENCOUNTER — LAB VISIT (OUTPATIENT)
Dept: LAB | Facility: HOSPITAL | Age: 37
End: 2022-05-24
Attending: FAMILY MEDICINE
Payer: MEDICAID

## 2022-05-24 ENCOUNTER — OFFICE VISIT (OUTPATIENT)
Dept: INTERNAL MEDICINE | Facility: CLINIC | Age: 37
End: 2022-05-24
Payer: MEDICAID

## 2022-05-24 VITALS
HEART RATE: 105 BPM | BODY MASS INDEX: 27.28 KG/M2 | SYSTOLIC BLOOD PRESSURE: 98 MMHG | TEMPERATURE: 98 F | WEIGHT: 159.81 LBS | HEIGHT: 64 IN | DIASTOLIC BLOOD PRESSURE: 78 MMHG

## 2022-05-24 DIAGNOSIS — Z12.31 SCREENING MAMMOGRAM, ENCOUNTER FOR: ICD-10-CM

## 2022-05-24 DIAGNOSIS — G43.909 MIGRAINE WITHOUT STATUS MIGRAINOSUS, NOT INTRACTABLE, UNSPECIFIED MIGRAINE TYPE: ICD-10-CM

## 2022-05-24 DIAGNOSIS — M54.16 LUMBAR RADICULOPATHY, CHRONIC: ICD-10-CM

## 2022-05-24 DIAGNOSIS — G40.909 NONINTRACTABLE EPILEPSY WITHOUT STATUS EPILEPTICUS, UNSPECIFIED EPILEPSY TYPE: ICD-10-CM

## 2022-05-24 DIAGNOSIS — M54.9 BACK PAIN, UNSPECIFIED BACK LOCATION, UNSPECIFIED BACK PAIN LATERALITY, UNSPECIFIED CHRONICITY: Primary | ICD-10-CM

## 2022-05-24 DIAGNOSIS — E53.8 B12 DEFICIENCY: ICD-10-CM

## 2022-05-24 DIAGNOSIS — H53.9 VISION DISTURBANCE: ICD-10-CM

## 2022-05-24 DIAGNOSIS — R76.8 HEPATITIS C ANTIBODY TEST POSITIVE: ICD-10-CM

## 2022-05-24 DIAGNOSIS — Z28.9 DELAYED IMMUNIZATIONS: ICD-10-CM

## 2022-05-24 PROCEDURE — 3074F SYST BP LT 130 MM HG: CPT | Mod: CPTII,,, | Performed by: FAMILY MEDICINE

## 2022-05-24 PROCEDURE — 99214 OFFICE O/P EST MOD 30 MIN: CPT | Mod: S$PBB,,, | Performed by: FAMILY MEDICINE

## 2022-05-24 PROCEDURE — 3008F PR BODY MASS INDEX (BMI) DOCUMENTED: ICD-10-PCS | Mod: CPTII,,, | Performed by: FAMILY MEDICINE

## 2022-05-24 PROCEDURE — 99214 PR OFFICE/OUTPT VISIT, EST, LEVL IV, 30-39 MIN: ICD-10-PCS | Mod: S$PBB,,, | Performed by: FAMILY MEDICINE

## 2022-05-24 PROCEDURE — 1159F MED LIST DOCD IN RCRD: CPT | Mod: CPTII,,, | Performed by: FAMILY MEDICINE

## 2022-05-24 PROCEDURE — 3078F DIAST BP <80 MM HG: CPT | Mod: CPTII,,, | Performed by: FAMILY MEDICINE

## 2022-05-24 PROCEDURE — 99999 PR PBB SHADOW E&M-EST. PATIENT-LVL V: CPT | Mod: PBBFAC,,, | Performed by: FAMILY MEDICINE

## 2022-05-24 PROCEDURE — 36415 COLL VENOUS BLD VENIPUNCTURE: CPT | Mod: PO | Performed by: FAMILY MEDICINE

## 2022-05-24 PROCEDURE — 1159F PR MEDICATION LIST DOCUMENTED IN MEDICAL RECORD: ICD-10-PCS | Mod: CPTII,,, | Performed by: FAMILY MEDICINE

## 2022-05-24 PROCEDURE — 3078F PR MOST RECENT DIASTOLIC BLOOD PRESSURE < 80 MM HG: ICD-10-PCS | Mod: CPTII,,, | Performed by: FAMILY MEDICINE

## 2022-05-24 PROCEDURE — 99215 OFFICE O/P EST HI 40 MIN: CPT | Mod: PBBFAC,PO | Performed by: FAMILY MEDICINE

## 2022-05-24 PROCEDURE — 90677 PCV20 VACCINE IM: CPT | Mod: PBBFAC,PO

## 2022-05-24 PROCEDURE — 96372 THER/PROPH/DIAG INJ SC/IM: CPT | Mod: PBBFAC,PO

## 2022-05-24 PROCEDURE — 3074F PR MOST RECENT SYSTOLIC BLOOD PRESSURE < 130 MM HG: ICD-10-PCS | Mod: CPTII,,, | Performed by: FAMILY MEDICINE

## 2022-05-24 PROCEDURE — 87522 HEPATITIS C REVRS TRNSCRPJ: CPT | Performed by: FAMILY MEDICINE

## 2022-05-24 PROCEDURE — 99999 PR PBB SHADOW E&M-EST. PATIENT-LVL V: ICD-10-PCS | Mod: PBBFAC,,, | Performed by: FAMILY MEDICINE

## 2022-05-24 PROCEDURE — 3008F BODY MASS INDEX DOCD: CPT | Mod: CPTII,,, | Performed by: FAMILY MEDICINE

## 2022-05-24 RX ORDER — CYANOCOBALAMIN 1000 UG/ML
1000 INJECTION, SOLUTION INTRAMUSCULAR; SUBCUTANEOUS
Status: COMPLETED | OUTPATIENT
Start: 2022-05-24 | End: 2022-05-24

## 2022-05-24 RX ORDER — TOPIRAMATE 100 MG/1
100 TABLET, FILM COATED ORAL 2 TIMES DAILY
Qty: 60 TABLET | Refills: 0 | Status: SHIPPED | OUTPATIENT
Start: 2022-05-24 | End: 2022-06-20

## 2022-05-24 RX ORDER — KETOROLAC TROMETHAMINE 10 MG/1
10 TABLET, FILM COATED ORAL EVERY 6 HOURS
COMMUNITY
Start: 2022-05-09 | End: 2022-05-24

## 2022-05-24 RX ORDER — KETOROLAC TROMETHAMINE 30 MG/ML
60 INJECTION, SOLUTION INTRAMUSCULAR; INTRAVENOUS
Status: COMPLETED | OUTPATIENT
Start: 2022-05-24 | End: 2022-05-24

## 2022-05-24 RX ORDER — ORPHENADRINE CITRATE 30 MG/ML
60 INJECTION INTRAMUSCULAR; INTRAVENOUS
Status: COMPLETED | OUTPATIENT
Start: 2022-05-24 | End: 2022-05-24

## 2022-05-24 RX ORDER — GABAPENTIN 300 MG/1
300 CAPSULE ORAL 2 TIMES DAILY
Qty: 60 CAPSULE | Refills: 0 | Status: SHIPPED | OUTPATIENT
Start: 2022-05-24 | End: 2022-06-20

## 2022-05-24 RX ORDER — CYCLOBENZAPRINE HCL 10 MG
10 TABLET ORAL NIGHTLY
Qty: 30 TABLET | Refills: 1 | Status: SHIPPED | OUTPATIENT
Start: 2022-05-24 | End: 2022-06-21 | Stop reason: SDUPTHER

## 2022-05-24 RX ADMIN — KETOROLAC TROMETHAMINE 60 MG: 60 INJECTION, SOLUTION INTRAMUSCULAR at 03:05

## 2022-05-24 RX ADMIN — CYANOCOBALAMIN 1000 MCG: 1000 INJECTION, SOLUTION INTRAMUSCULAR at 03:05

## 2022-05-24 RX ADMIN — ORPHENADRINE CITRATE 60 MG: 30 INJECTION INTRAMUSCULAR; INTRAVENOUS at 03:05

## 2022-05-24 NOTE — PROGRESS NOTES
Subjective:       Patient ID: Simon Reynolds is a 36 y.o. female.    Chief Complaint: Migraine    Back Pain  This is a chronic problem. Episode onset: 2 years. The problem occurs constantly. The problem has been gradually worsening since onset. The pain is present in the lumbar spine. The pain radiates to the right foot and left foot. The pain is severe. The symptoms are aggravated by bending and position. Pertinent negatives include no abdominal pain, chest pain or fever. Risk factors: trauma Oct 25,2019 - work related injury. She has tried nothing for the symptoms.     Review of Systems   Constitutional: Negative for fever.   Cardiovascular: Negative for chest pain.   Gastrointestinal: Negative for abdominal pain.   Musculoskeletal: Positive for back pain.       Objective:      Physical Exam  Vitals and nursing note reviewed.   Constitutional:       General: She is not in acute distress.     Appearance: Normal appearance. She is well-developed. She is not diaphoretic.   HENT:      Head: Normocephalic and atraumatic.   Pulmonary:      Effort: Pulmonary effort is normal. No respiratory distress.      Breath sounds: Normal breath sounds. No wheezing.   Skin:     General: Skin is warm and dry.      Findings: No erythema or rash.   Neurological:      Mental Status: She is alert.         Assessment:       1. Back pain, unspecified back location, unspecified back pain laterality, unspecified chronicity    2. Delayed immunizations    3. Nonintractable epilepsy without status epilepticus, unspecified epilepsy type    4. Migraine without status migrainosus, not intractable, unspecified migraine type    5. Screening mammogram, encounter for    6. Lumbar radiculopathy, chronic    7. B12 deficiency    8. Hepatitis C antibody test positive    9. Vision disturbance        Plan:     Problem List Items Addressed This Visit        Neuro    Migraines    Relevant Medications    topiramate (TOPAMAX) 100 MG tablet    Other Relevant  Orders    Ambulatory referral/consult to Neurology    Nonintractable epilepsy without status epilepticus    Relevant Orders    Ambulatory referral/consult to Neurology    Lumbar radiculopathy, chronic    Current Assessment & Plan     Chronic, worsening           Relevant Medications    gabapentin (NEURONTIN) 300 MG capsule    orphenadrine injection 60 mg (Completed)    ketorolac injection 60 mg (Completed)    Other Relevant Orders    Ambulatory referral/consult to Pain Clinic    Ambulatory referral/consult to Neurology    EMG W/ ULTRASOUND AND NERVE CONDUCTION TEST 2 Extremities    Ambulatory referral/consult to Physical/Occupational Therapy    MRI Lumbar Spine Without Contrast       Ophtho    Vision disturbance    Relevant Orders    Ambulatory referral/consult to Optometry       Renal/    Screening mammogram, encounter for       ID    Delayed immunizations    Relevant Orders    Pneumococcal Conjugate Vaccine (20 Valent) (IM) (Completed)       Endocrine    B12 deficiency    Relevant Medications    cyanocobalamin injection 1,000 mcg (Completed)       GI    Hepatitis C antibody test positive    Relevant Orders    HEPATITIS C RNA, QUANTITATIVE, PCR       Orthopedic    Back pain - Primary    Current Assessment & Plan     Toradol injection. Do not take aspirin or nsaids until 48 hrs after injection.    Norflex injection. Do not take flexeril until 48 hrs after injection.             Relevant Medications    cyclobenzaprine (FLEXERIL) 10 MG tablet

## 2022-05-24 NOTE — ASSESSMENT & PLAN NOTE
Toradol injection. Do not take aspirin or nsaids until 48 hrs after injection.    Norflex injection. Do not take flexeril until 48 hrs after injection.

## 2022-05-28 LAB — HCV RNA SERPL NAA+PROBE-ACNC: NORMAL IU/ML

## 2022-06-21 ENCOUNTER — PATIENT MESSAGE (OUTPATIENT)
Dept: INTERNAL MEDICINE | Facility: CLINIC | Age: 37
End: 2022-06-21
Payer: MEDICAID

## 2022-06-28 PROBLEM — K59.03 THERAPEUTIC OPIOID-INDUCED CONSTIPATION (OIC): Status: RESOLVED | Noted: 2020-10-15 | Resolved: 2022-06-28

## 2022-06-28 PROBLEM — T40.2X5A THERAPEUTIC OPIOID-INDUCED CONSTIPATION (OIC): Status: RESOLVED | Noted: 2020-10-15 | Resolved: 2022-06-28

## 2022-07-17 DIAGNOSIS — M54.16 LUMBAR RADICULOPATHY, CHRONIC: ICD-10-CM

## 2022-07-17 DIAGNOSIS — G43.909 MIGRAINE WITHOUT STATUS MIGRAINOSUS, NOT INTRACTABLE, UNSPECIFIED MIGRAINE TYPE: ICD-10-CM

## 2022-07-18 RX ORDER — GABAPENTIN 300 MG/1
CAPSULE ORAL
Qty: 60 CAPSULE | Refills: 0 | OUTPATIENT
Start: 2022-07-18

## 2022-07-18 RX ORDER — TOPIRAMATE 100 MG/1
TABLET, FILM COATED ORAL
Qty: 60 TABLET | OUTPATIENT
Start: 2022-07-18

## 2022-08-10 ENCOUNTER — OFFICE VISIT (OUTPATIENT)
Dept: PAIN MEDICINE | Facility: CLINIC | Age: 37
End: 2022-08-10
Payer: MEDICAID

## 2022-08-10 VITALS
DIASTOLIC BLOOD PRESSURE: 76 MMHG | HEIGHT: 64 IN | HEART RATE: 76 BPM | RESPIRATION RATE: 17 BRPM | SYSTOLIC BLOOD PRESSURE: 105 MMHG | WEIGHT: 156.88 LBS | BODY MASS INDEX: 26.78 KG/M2

## 2022-08-10 DIAGNOSIS — M54.16 LUMBAR RADICULOPATHY, CHRONIC: ICD-10-CM

## 2022-08-10 PROCEDURE — 1160F RVW MEDS BY RX/DR IN RCRD: CPT | Mod: CPTII,,, | Performed by: PHYSICAL MEDICINE & REHABILITATION

## 2022-08-10 PROCEDURE — 1160F PR REVIEW ALL MEDS BY PRESCRIBER/CLIN PHARMACIST DOCUMENTED: ICD-10-PCS | Mod: CPTII,,, | Performed by: PHYSICAL MEDICINE & REHABILITATION

## 2022-08-10 PROCEDURE — 3078F PR MOST RECENT DIASTOLIC BLOOD PRESSURE < 80 MM HG: ICD-10-PCS | Mod: CPTII,,, | Performed by: PHYSICAL MEDICINE & REHABILITATION

## 2022-08-10 PROCEDURE — 99204 OFFICE O/P NEW MOD 45 MIN: CPT | Mod: S$PBB,,, | Performed by: PHYSICAL MEDICINE & REHABILITATION

## 2022-08-10 PROCEDURE — 1159F MED LIST DOCD IN RCRD: CPT | Mod: CPTII,,, | Performed by: PHYSICAL MEDICINE & REHABILITATION

## 2022-08-10 PROCEDURE — 3074F SYST BP LT 130 MM HG: CPT | Mod: CPTII,,, | Performed by: PHYSICAL MEDICINE & REHABILITATION

## 2022-08-10 PROCEDURE — 99999 PR PBB SHADOW E&M-EST. PATIENT-LVL IV: CPT | Mod: PBBFAC,,, | Performed by: PHYSICAL MEDICINE & REHABILITATION

## 2022-08-10 PROCEDURE — 3078F DIAST BP <80 MM HG: CPT | Mod: CPTII,,, | Performed by: PHYSICAL MEDICINE & REHABILITATION

## 2022-08-10 PROCEDURE — 99999 PR PBB SHADOW E&M-EST. PATIENT-LVL IV: ICD-10-PCS | Mod: PBBFAC,,, | Performed by: PHYSICAL MEDICINE & REHABILITATION

## 2022-08-10 PROCEDURE — 99204 PR OFFICE/OUTPT VISIT, NEW, LEVL IV, 45-59 MIN: ICD-10-PCS | Mod: S$PBB,,, | Performed by: PHYSICAL MEDICINE & REHABILITATION

## 2022-08-10 PROCEDURE — 3008F BODY MASS INDEX DOCD: CPT | Mod: CPTII,,, | Performed by: PHYSICAL MEDICINE & REHABILITATION

## 2022-08-10 PROCEDURE — 99214 OFFICE O/P EST MOD 30 MIN: CPT | Mod: PBBFAC | Performed by: PHYSICAL MEDICINE & REHABILITATION

## 2022-08-10 PROCEDURE — 1159F PR MEDICATION LIST DOCUMENTED IN MEDICAL RECORD: ICD-10-PCS | Mod: CPTII,,, | Performed by: PHYSICAL MEDICINE & REHABILITATION

## 2022-08-10 PROCEDURE — 3008F PR BODY MASS INDEX (BMI) DOCUMENTED: ICD-10-PCS | Mod: CPTII,,, | Performed by: PHYSICAL MEDICINE & REHABILITATION

## 2022-08-10 PROCEDURE — 3074F PR MOST RECENT SYSTOLIC BLOOD PRESSURE < 130 MM HG: ICD-10-PCS | Mod: CPTII,,, | Performed by: PHYSICAL MEDICINE & REHABILITATION

## 2022-08-10 RX ORDER — GABAPENTIN 300 MG/1
300 CAPSULE ORAL 3 TIMES DAILY
Qty: 90 CAPSULE | Refills: 11 | Status: SHIPPED | OUTPATIENT
Start: 2022-08-10 | End: 2022-11-10 | Stop reason: SDUPTHER

## 2022-08-10 RX ORDER — TRAMADOL HYDROCHLORIDE 50 MG/1
50 TABLET ORAL EVERY 8 HOURS PRN
COMMUNITY
Start: 2021-12-09 | End: 2024-02-05

## 2022-08-10 RX ORDER — IBUPROFEN 600 MG/1
TABLET ORAL
COMMUNITY
Start: 2021-12-15 | End: 2023-01-18 | Stop reason: SDUPTHER

## 2022-08-10 RX ORDER — OXYCODONE AND ACETAMINOPHEN 5; 325 MG/1; MG/1
1-2 TABLET ORAL
COMMUNITY
Start: 2021-12-15 | End: 2024-02-05

## 2022-08-10 RX ORDER — NAPROXEN 500 MG/1
500 TABLET ORAL 2 TIMES DAILY PRN
COMMUNITY
Start: 2021-12-09 | End: 2023-02-23 | Stop reason: ALTCHOICE

## 2022-08-10 RX ORDER — NORTRIPTYLINE HYDROCHLORIDE 25 MG/1
25 CAPSULE ORAL NIGHTLY
Qty: 30 CAPSULE | Refills: 11 | Status: SHIPPED | OUTPATIENT
Start: 2022-08-10 | End: 2023-01-24

## 2022-08-10 RX ORDER — NICOTINE POLACRILEX 2 MG
GUM BUCCAL
COMMUNITY
Start: 2021-12-09

## 2022-08-10 NOTE — PROGRESS NOTES
New Patient Chronic Pain Note (Initial Visit)    Referring Physician: Darion Cross MD    PCP: Darion Cross MD    Chief Complaint:   Chief Complaint   Patient presents with    Low-back Pain     C/o lower back that radiates down both legs to toes        SUBJECTIVE:    Simon Reynolds is a 36 y.o. female who presents to the clinic for the evaluation of lower back and leg pain.  She was referred by her primary care provider for further evaluation and management of this pain.  She has a past medical history of migraines, epilepsy, and multiple other medical comorbidities as listed in her chart.  The pain started 2-3 years ago following a work related injury and symptoms have been unchanged.The pain is located in the lumbosacral area and radiates to the lower extremities.  The pain is described as Sharp, stabbing, throbbing, aching, electric, numbness/tingling and is rated as 7/10. The pain is rated with a score of  6/10 on the BEST day and a score of 10/10 on the WORST day.  Symptoms interfere with daily activity. The pain is exacerbated by bending, squatting.  The pain is mitigated by heat/ice and laying down.    Patient denies night fever/night sweats, urinary incontinence, bowel incontinence, significant weight loss, significant motor weakness and loss of sensations.    Pain Disability Index Review:  Last 3 PDI Scores 8/10/2022   Pain Disability Index (PDI) 49       Non-Pharmacologic Treatments:  Physical Therapy/Home Exercise: yes  Ice/Heat:yes  TENS: yes  Acupuncture: no  Massage: yes  Chiropractic: no    Other: no      Pain Medications:  - Opioids: Tramadol, Norco, Percocet, methadone  - Adjuvant Medications: NSAIDs, gabapentin, Cymbalta, Tylenol, baclofen, Zanaflex, Flexeril, lidocaine patch, Celebrex, amitriptyline, nortriptyline, Topamax, Voltaren gel  - Anti-Coagulants: None     report:  Reviewed and consistent with medication use as prescribed.      Pain Procedures:   Previous cervical and  lumbar interventions, unknown types      Imaging:   MRI lumbar spine pending    CT abdomen pelvis 10/09/2021:  The visualized lung bases are clear. The heart is normal in size.     The right hepatic lobe measures 19.5 cm in span. The left hepatic lobe measures 7.9 cm in span. The spleen is normal in size. The liver and spleen are normal in density.     There is some linear increased density dependently in the gallbladder which is probably related to a small fold in the gallbladder rather than representing any gallstones. Otherwise, the gallbladder and biliary ductal system are normal.     The pancreas, adrenal glands, and right kidney are normal. One or two small cortical cysts are seen anteriorly in the left upper pole renal cortex. There is no obvious renal or ureteral calculus or hydronephrosis.     The anterior wall of the urinary bladder appears slightly thickened, which, at least in part, may be related to the partially decompressed state of the urinary bladder.     The uterus is midline and anteverted. The large right ovarian cyst seen on the previous CT has resolved in the interim, with no abnormal ovarian mass currently seen. There is no free fluid within the pelvis and no ascites is noted. No adenopathy is seen.     The appendix is normal. No abnormal bowel distention or bowel wall thickening is noted. There are multiple small diverticula along the descending colon and at the junction of the descending colon and sigmoid colon. No diverticulitis.     The regional skeleton is intact. No fracture or dislocation is evident. No lytic or blastic bone lesion is seen.      Past Medical History:   Diagnosis Date    Breast mass     Encounter for hepatitis C virus screening test for high risk patient 8/4/2020    Epilepsy     Hepatitis C antibody positive in blood     History of alcohol abuse     History of drug abuse     Migraine     Ovarian cyst     Scoliosis     Spine curvature, acquired     Spine  disorder     Therapeutic opioid-induced constipation (OIC) 10/15/2020     Past Surgical History:   Procedure Laterality Date    EPIDURAL STEROID INJECTION      REPAIR OF HIATAL HERNIA USING MESH      ROBOT-ASSISTED SALPINGECTOMY N/A 12/15/2021    [facility]/BS    ROBOTIC ASSISTED HYSTERECTOMY N/A 12/15/2021    [facility]/BS- Uterine Fibroids; Pelvic pain; Menorrhagia    SURGICAL REMOVAL OF MASS OF UPPER EXTREMITY      right breast     Social History     Socioeconomic History    Marital status:      Spouse name: 2   Occupational History    Occupation: industrial shipping    Tobacco Use    Smoking status: Current Every Day Smoker     Types: Vaping with nicotine     Last attempt to quit: 2021     Years since quittin.6    Smokeless tobacco: Never Used   Substance and Sexual Activity    Alcohol use: Not Currently     Comment: sober 8 years    Drug use: Not Currently     Types: Benzodiazepines, Methamphetamines     Comment: recovery 6 years    Sexual activity: Yes     Partners: Male     Birth control/protection: Condom   Social History Narrative    Daughter of current patient maryuri Reynolds, she recently moved here after being hurt on job in [facility] [place]     Family History   Problem Relation Age of Onset    Alcohol abuse Mother     Drug abuse Mother     Cancer Mother         bilat breast    Breast cancer Maternal Grandmother     Breast cancer Paternal Grandmother        Review of patient's allergies indicates:   Allergen Reactions    Sulfa (sulfonamide antibiotics) Anaphylaxis, Hives, Itching and Other (See Comments)       Current Outpatient Medications   Medication Sig    acetaminophen 325 mg Cap Tylenol    biotin 1 mg Cap Take by mouth.    cyclobenzaprine (FLEXERIL) 10 MG tablet Take 1 tablet (10 mg total) by mouth every evening.    ibuprofen (ADVIL,MOTRIN) 600 MG tablet Take by mouth.    linaCLOtide (LINZESS) 72 mcg Cap capsule Take 1 capsule (72 mcg total) by mouth before breakfast.     "melatonin 5 mg Cap Take 5 mg by mouth every evening.    multivit with minerals/lutein (MULTIVITAMIN 50 PLUS ORAL) multivitamin    naproxen (NAPROSYN) 500 MG tablet Take by mouth.    oxyCODONE-acetaminophen (PERCOCET) 5-325 mg per tablet Take 1-2 tablets by mouth.    topiramate (TOPAMAX) 100 MG tablet TAKE 1 TABLET BY MOUTH EVERY DAY    traMADoL (ULTRAM) 50 mg tablet Take by mouth.    gabapentin (NEURONTIN) 300 MG capsule Take 1 capsule (300 mg total) by mouth 3 (three) times daily.    nortriptyline (PAMELOR) 25 MG capsule Take 1 capsule (25 mg total) by mouth every evening.     No current facility-administered medications for this visit.       Review of Systems     GENERAL:  No weight loss, malaise or fevers.  HEENT:   No recent changes in vision or hearing  NECK:  Negative for lumps, no difficulty with swallowing.  RESPIRATORY:  Negative for cough, wheezing or shortness of breath, patient denies any recent URI.  CARDIOVASCULAR:  Negative for chest pain, leg swelling or palpitations.  GI:  Negative for abdominal discomfort, blood in stools or black stools or change in bowel habits.  MUSCULOSKELETAL:  See HPI.  SKIN:  Negative for lesions, rash, and itching.  PSYCH:  No mood disorder or recent psychosocial stressors.  Patients sleep is not disturbed secondary to pain.  HEMATOLOGY/LYMPHOLOGY:  Negative for prolonged bleeding, bruising easily or swollen nodes.  Patient is not currently taking any anti-coagulants  NEURO:   No history of headaches, syncope, paralysis, seizures or tremors.  All other reviewed and negative other than HPI.    OBJECTIVE:    /76   Pulse 76   Resp 17   Ht 5' 4" (1.626 m)   Wt 71.2 kg (156 lb 13.7 oz)   LMP 12/02/2021 (Exact Date)   BMI 26.92 kg/m²         Physical Exam    GENERAL: Well appearing, in no acute distress, alert and oriented x3.  PSYCH:  Mood and affect appropriate.  SKIN: Skin color, texture, turgor normal, no rashes or lesions.  HEAD/FACE:  Normocephalic, " atraumatic. Cranial nerves grossly intact.  CV: RRR with palpation of the radial artery.  PULM: No evidence of respiratory difficulty, symmetric chest rise.  GI:  Soft and non-tender.  BACK: Straight leg raising in the sitting and supine positions is equivocal to radicular pain.  Mild-to-moderate pain to palpation over the facet joints of the lumbar spine and lumbar paraspinals.  Limited range of motion secondary to pain reproduction.  EXTREMITIES: Peripheral joint ROM is full and pain free without obvious instability or laxity in all four extremities. No deformities, edema, or skin discoloration. Good capillary refill.  MUSCULOSKELETAL: Shoulder, hip, and knee provocative maneuvers are negative.  There is no pain with palpation over the sacroiliac joints bilaterally.  FABERs test is negative.  FADIRs test is negative.   Bilateral upper and lower extremity strength is normal and symmetric.  No atrophy or tone abnormalities are noted.  NEURO: Bilateral upper and lower extremity coordination and muscle stretch reflexes are physiologic and symmetric.  Plantar response are downgoing. No clonus.  No loss of sensation is noted.  GAIT: normal.      LABS:  Lab Results   Component Value Date    WBC 9.68 07/08/2020    HGB 13.8 07/08/2020    HCT 44.7 07/08/2020    MCV 87 07/08/2020     07/08/2020       CMP  Sodium   Date Value Ref Range Status   12/10/2021 139 136 - 145 mmol/L Final     Potassium   Date Value Ref Range Status   12/10/2021 3.8 3.5 - 5.1 mmol/L Final     Chloride   Date Value Ref Range Status   12/10/2021 108 95 - 110 mmol/L Final     CO2   Date Value Ref Range Status   12/10/2021 16 (L) 23 - 29 mmol/L Final     Glucose   Date Value Ref Range Status   12/10/2021 82 70 - 110 mg/dL Final     BUN   Date Value Ref Range Status   12/10/2021 12 6 - 20 mg/dL Final     Creatinine   Date Value Ref Range Status   12/10/2021 0.8 0.5 - 1.4 mg/dL Final     Calcium   Date Value Ref Range Status   12/10/2021 8.7 8.7 -  10.5 mg/dL Final     Total Protein   Date Value Ref Range Status   12/10/2021 6.8 6.0 - 8.4 g/dL Final     Albumin   Date Value Ref Range Status   12/10/2021 3.7 3.5 - 5.2 g/dL Final     Total Bilirubin   Date Value Ref Range Status   12/10/2021 0.2 0.1 - 1.0 mg/dL Final     Comment:     For infants and newborns, interpretation of results should be based  on gestational age, weight and in agreement with clinical  observations.    Premature Infant recommended reference ranges:  Up to 24 hours.............<8.0 mg/dL  Up to 48 hours............<12.0 mg/dL  3-5 days..................<15.0 mg/dL  6-29 days.................<15.0 mg/dL       Alkaline Phosphatase   Date Value Ref Range Status   12/10/2021 69 55 - 135 U/L Final     AST   Date Value Ref Range Status   12/10/2021 19 10 - 40 U/L Final     ALT   Date Value Ref Range Status   12/10/2021 25 10 - 44 U/L Final     Anion Gap   Date Value Ref Range Status   12/10/2021 15 8 - 16 mmol/L Final     eGFR if    Date Value Ref Range Status   12/10/2021 >60.0 >60 mL/min/1.73 m^2 Final     eGFR if non    Date Value Ref Range Status   12/10/2021 >60.0 >60 mL/min/1.73 m^2 Final     Comment:     Calculation used to obtain the estimated glomerular filtration  rate (eGFR) is the CKD-EPI equation.          Lab Results   Component Value Date    HGBA1C 4.9 12/10/2021             ASSESSMENT: 36 y.o. year old female with  lower back pain, consistent with     1. Lumbar radiculopathy, chronic  Ambulatory referral/consult to Pain Clinic         PLAN:   - Interventions:  None at this time    - Anticoagulation use: no     - Medications: I have stressed the importance of physical activity and a home exercise plan to help with pain and improve health. and Patient can continue with medications for now since they are providing benefits, using them appropriately, and without side effects.     Provide refills of gabapentin 300 mg t.i.d.  Also add Pamelor 25 mg  nightly    - Therapy:  Advised patient continue with activities as tolerated    - Psychological:  Discussed coping mechanisms to help address chronic pain issues    - Labs:  Reviewed    - Imaging: Reviewed available imaging with patient and answered any questions they had regarding study.    - Consults/Referrals:  None at this time    - Records:  Attempt to obtain records from previous pain management specialist in [place].  Reviewed/Obtain old records from outside physicians and imaging    - Follow up visit: return to clinic as needed  -The patient's follow-up will be with our physician assistant, Lashawn Morgan PA-C.    - Counseled patient regarding the importance of activity modification and physical therapy    - This condition does not require this patient to take time off of work, and the primary goal of our Pain Management services is to improve the patient's functional capacity.    - Patient Questions: Answered all of the patient's questions regarding diagnosis, therapy, and treatment        The above plan and management options were discussed at length with patient. Patient is in agreement with the above and verbalized understanding.    I discussed the goals of interventional chronic pain management with the patient on today's visit.  I explained the utility of injections for diagnostic and therapeutic purposes.  We discussed a multimodal approach to pain including treating the patient's given worst pain at any given time.  We will use a systematic approach to addressing pain.  We will also adopt a multimodal approach that includes injections, adjuvant medications, physical therapy, at times psychiatry.  There may be a limited role for opioid use intermittently in the treatment of pain, more particularly for acute pain although no one approach can be used as a sole treatment modality.    I emphasized the importance of regular exercise, core strengthening and stretching, diet and weight loss as a cornerstone  of long-term pain management.      Michele Hernandez MD  Interventional Pain Management  [facility]    Disclaimer:  This note was prepared using voice recognition system and is likely to have sound alike errors that may have been overlooked even after proof reading.  Please call me with any questions

## 2022-08-17 ENCOUNTER — TELEPHONE (OUTPATIENT)
Dept: INTERNAL MEDICINE | Facility: CLINIC | Age: 37
End: 2022-08-17
Payer: MEDICAID

## 2022-08-17 ENCOUNTER — PATIENT MESSAGE (OUTPATIENT)
Dept: INTERNAL MEDICINE | Facility: CLINIC | Age: 37
End: 2022-08-17
Payer: MEDICAID

## 2022-08-17 NOTE — TELEPHONE ENCOUNTER
----- Message from Darion Cross MD sent at 8/12/2022  1:04 PM CDT -----  [facility] f/u appt for patient

## 2022-08-24 ENCOUNTER — PATIENT MESSAGE (OUTPATIENT)
Dept: PAIN MEDICINE | Facility: CLINIC | Age: 37
End: 2022-08-24
Payer: MEDICAID

## 2022-08-24 ENCOUNTER — HOSPITAL ENCOUNTER (OUTPATIENT)
Dept: RADIOLOGY | Facility: HOSPITAL | Age: 37
Discharge: HOME | End: 2022-08-24
Attending: FAMILY MEDICINE
Payer: MEDICAID

## 2022-08-24 DIAGNOSIS — M54.16 LUMBAR RADICULOPATHY, CHRONIC: ICD-10-CM

## 2022-08-24 PROCEDURE — 72148 MRI LUMBAR SPINE W/O DYE: CPT | Mod: TC,PN

## 2022-08-29 ENCOUNTER — OFFICE VISIT (OUTPATIENT)
Dept: PAIN MEDICINE | Facility: CLINIC | Age: 37
End: 2022-08-29
Payer: MEDICAID

## 2022-08-29 VITALS — RESPIRATION RATE: 17 BRPM

## 2022-08-29 DIAGNOSIS — M79.18 LUMBAR MUSCLE PAIN: Primary | ICD-10-CM

## 2022-08-29 DIAGNOSIS — M47.816 LUMBAR SPONDYLOSIS: ICD-10-CM

## 2022-08-29 DIAGNOSIS — M54.16 LUMBAR RADICULOPATHY, CHRONIC: ICD-10-CM

## 2022-08-29 PROCEDURE — 1159F MED LIST DOCD IN RCRD: CPT | Mod: CPTII,95,, | Performed by: PHYSICIAN ASSISTANT

## 2022-08-29 PROCEDURE — 99214 OFFICE O/P EST MOD 30 MIN: CPT | Mod: 95,,, | Performed by: PHYSICIAN ASSISTANT

## 2022-08-29 PROCEDURE — 99214 PR OFFICE/OUTPT VISIT, EST, LEVL IV, 30-39 MIN: ICD-10-PCS | Mod: 95,,, | Performed by: PHYSICIAN ASSISTANT

## 2022-08-29 PROCEDURE — 1160F RVW MEDS BY RX/DR IN RCRD: CPT | Mod: CPTII,95,, | Performed by: PHYSICIAN ASSISTANT

## 2022-08-29 PROCEDURE — 1159F PR MEDICATION LIST DOCUMENTED IN MEDICAL RECORD: ICD-10-PCS | Mod: CPTII,95,, | Performed by: PHYSICIAN ASSISTANT

## 2022-08-29 PROCEDURE — 1160F PR REVIEW ALL MEDS BY PRESCRIBER/CLIN PHARMACIST DOCUMENTED: ICD-10-PCS | Mod: CPTII,95,, | Performed by: PHYSICIAN ASSISTANT

## 2022-08-29 RX ORDER — CYCLOBENZAPRINE HCL 10 MG
10 TABLET ORAL NIGHTLY
Qty: 30 TABLET | Refills: 0 | Status: SHIPPED | OUTPATIENT
Start: 2022-08-29 | End: 2022-09-20 | Stop reason: SDUPTHER

## 2022-08-29 NOTE — PROGRESS NOTES
"The patient location is: home  The chief complaint leading to consultation is: chronic pain     Visit type: audiovisual    Face to Face time with patient: 10-15 minutes  20 minutes of total time spent on the encounter, which includes face to face time and non-face to face time preparing to see the patient (eg, review of tests), Obtaining and/or reviewing separately obtained history, Documenting clinical information in the electronic or other health record, Independently interpreting results (not separately reported) and communicating results to the patient/family/caregiver, or Care coordination (not separately reported).     Each patient to whom he or she provides medical services by telemedicine is:  (1) informed of the relationship between the physician and patient and the respective role of any other health care provider with respect to management of the patient; and (2) notified that he or she may decline to receive medical services by telemedicine and may withdraw from such care at any time.        Established  Patient Chronic Pain Note (Follow-up  Visit)    Referring Physician: Darion Cross MD    PCP: Darion Cross MD    Chief Complaint:   Chief Complaint   Patient presents with    Back Pain   With BLE radicular pain to feet       SUBJECTIVE:    Interval History (8/29/2022):  Simon Reynolds presents today for follow-up visit.  Patient was last seen about 3 weeks ago. She presents today on telemedicine visit to discuss MRI results (ordered by PCP). She c/o low back pain + BLE radiculopathy. She reports that she is uninterested in procedures because of "bad reactions in the past." She states that she refused physical therapy while she was on WC because "no one would tell her what was wrong with her". She is inquisitive about her condition today as well.       Initial HPI (8/10/22):  Simon Reynolds is a 36 y.o. female who presents to the clinic for the evaluation of lower back and leg pain.   She " "was referred by her primary care provider for further evaluation and management of this pain.  She has a past medical history of migraines, epilepsy, and multiple other medical comorbidities as listed in her chart.  The pain started 2-3 years ago following a work related injury and symptoms have been unchanged.The pain is located in the lumbosacral area and radiates to the lower extremities.  The pain is described as  Sharp, stabbing, throbbing, aching, electric, numbness/tingling  and is rated as 7/10. The pain is rated with a score of  6/10 on the BEST day and a score of 10/10 on the WORST day.  Symptoms interfere with daily activity. The pain is exacerbated by bending, squatting.  The pain is mitigated by heat/ice and laying down.  Patient denies night fever/night sweats, urinary incontinence, bowel incontinence, significant weight loss, significant motor weakness and loss of sensations.    Pain Disability Index Review:  Last 3 PDI Scores 8/10/2022   Pain Disability Index (PDI) 49       Non-Pharmacologic Treatments:  Physical Therapy/Home Exercise: yes  Ice/Heat:yes  TENS: yes  Acupuncture: no  Massage: yes  Chiropractic: no    Other: no      Pain Medications:  - Opioids: Tramadol, Norco, Percocet, methadone  - Adjuvant Medications: NSAIDs, gabapentin, Cymbalta, Tylenol, baclofen, Zanaflex, Flexeril, lidocaine patch, Celebrex, amitriptyline, nortriptyline, Topamax, Voltaren gel  - Anti-Coagulants: None     report:  Reviewed and consistent with medication use as prescribed.      Pain Procedures:   Previous cervical and lumbar interventions - per reporting: had several (>5) lum ESIs & reports having "bad reaction" to interventional pain procedures in the past (once being unable to walk after)      Imaging (Reviewed on 8/29/2022):     8/24/2022 MRI Lumbar Spine Without Contrast  FINDINGS:  The distal cord and conus appear intrinsically normal.    The cauda equina nerve roots appear normal    Partially included on " this exam is a fluid signal intensity structure in the right lateral epidural space at the T11 and T12 vertebral body level.  The lesion displays fluid signal intensity characteristics and extends into the right T12-L1 neural foramen.  There is mild chronic remodeling of the posterior aspect of the T12 vertebral body and right T12 pedicle.  Approximate size 12.3 x 4.8 mm transversely with craniocaudal length of 33.9 mm.  Probably represents a chronic benign fluid signal intensity collection such as an arachnoid cyst or perineural cyst/diverticulum.    T12-L1: Disc level unremarkable.    L1-2:     The disc level is unremarkable.    L2-3:     The disc level is unremarkable.    L3-4:     Minor facet arthrosis.    L4-5:     Minor disc desiccation with annular bulge.  Moderate left and mild right hypertrophic facet arthrosis.    L5-S1:    Unremarkable.  Impression:   Minor L4-5 disc bulge with moderate left and mild right facet arthrosis.  Mild foraminal stenosis.  Fluid signal intensity structure at the right T11/T12 level most consistent with benign fluid collection such as an arachnoid cyst or perineural diverticulum.  See above for detail.      CT abdomen pelvis 10/09/2021:  The visualized lung bases are clear. The heart is normal in size.     The right hepatic lobe measures 19.5 cm in span. The left hepatic lobe measures 7.9 cm in span. The spleen is normal in size. The liver and spleen are normal in density.     There is some linear increased density dependently in the gallbladder which is probably related to a small fold in the gallbladder rather than representing any gallstones. Otherwise, the gallbladder and biliary ductal system are normal.     The pancreas, adrenal glands, and right kidney are normal. One or two small cortical cysts are seen anteriorly in the left upper pole renal cortex. There is no obvious renal or ureteral calculus or hydronephrosis.     The anterior wall of the urinary bladder appears  slightly thickened, which, at least in part, may be related to the partially decompressed state of the urinary bladder.     The uterus is midline and anteverted. The large right ovarian cyst seen on the previous CT has resolved in the interim, with no abnormal ovarian mass currently seen. There is no free fluid within the pelvis and no ascites is noted. No adenopathy is seen.     The appendix is normal. No abnormal bowel distention or bowel wall thickening is noted. There are multiple small diverticula along the descending colon and at the junction of the descending colon and sigmoid colon. No diverticulitis.     The regional skeleton is intact. No fracture or dislocation is evident. No lytic or blastic bone lesion is seen.            Review of Systems:   GENERAL:  No weight loss, malaise or fevers.  HEENT:   No recent changes in vision or hearing  NECK:  Negative for lumps, no difficulty with swallowing.  RESPIRATORY:  Negative for cough, wheezing or shortness of breath, patient denies any recent URI.  CARDIOVASCULAR:  Negative for chest pain, leg swelling or palpitations.  GI:  Negative for abdominal discomfort, blood in stools or black stools or change in bowel habits.  MUSCULOSKELETAL:  See HPI.  SKIN:  Negative for lesions, rash, and itching.  PSYCH:  No mood disorder or recent psychosocial stressors.  Patients sleep is not disturbed secondary to pain.  HEMATOLOGY/LYMPHOLOGY:  Negative for prolonged bleeding, bruising easily or swollen nodes.  Patient is not currently taking any anti-coagulants  NEURO:   No history of headaches, syncope, paralysis, seizures or tremors.  All other reviewed and negative other than HPI.      OBJECTIVE:    Telemedicine Exam  Vitals:    08/29/22 1230   Resp: 17     There is no height or weight on file to calculate BMI.   (reviewed on 8/29/2022)     GENERAL: Well appearing, in no acute distress, alert and oriented x3.  Cooperative.  PSYCH:  Mood and affect appropriate.  SKIN: Skin  color & texture with no obvious abnormalities.    HEAD/FACE:  Normocephalic, atraumatic.    PULM:  No difficulty breathing. No nasal flaring. No obvious wheezing.  EXTREMITIES: No obvious deformities. Moving all extremities well, appears to have symmetric strength throughout.  MUSCULOSKELETAL: No obvious atrophy abnormalities are noted.   NEURO: No obvious neurologic deficit.   GAIT: sitting.     Physical Exam: last in clinic visit:  GENERAL: Well appearing, in no acute distress, alert and oriented x3.  PSYCH:  Mood and affect appropriate.  SKIN: Skin color, texture, turgor normal, no rashes or lesions.  HEAD/FACE:  Normocephalic, atraumatic. Cranial nerves grossly intact.  CV: RRR with palpation of the radial artery.  PULM: No evidence of respiratory difficulty, symmetric chest rise.  GI:  Soft and non-tender.  BACK: Straight leg raising in the sitting and supine positions is equivocal to radicular pain.  Mild-to-moderate pain to palpation over the facet joints of the lumbar spine and lumbar paraspinals.  Limited range of motion secondary to pain reproduction.  EXTREMITIES: Peripheral joint ROM is full and pain free without obvious instability or laxity in all four extremities. No deformities, edema, or skin discoloration. Good capillary refill.  MUSCULOSKELETAL: Shoulder, hip, and knee provocative maneuvers are negative.  There is no pain with palpation over the sacroiliac joints bilaterally.  FABERs test is negative.  FADIRs test is negative.   Bilateral upper and lower extremity strength is normal and symmetric.  No atrophy or tone abnormalities are noted.  NEURO: Bilateral upper and lower extremity coordination and muscle stretch reflexes are physiologic and symmetric.  Plantar response are downgoing. No clonus.  No loss of sensation is noted.  GAIT: normal.            ASSESSMENT: 36 y.o. year old female with  lower back pain, consistent with     1. Lumbar muscle pain  cyclobenzaprine (FLEXERIL) 10 MG tablet     "  2. Lumbar radiculopathy, chronic  Ambulatory referral/consult to Physical/Occupational Therapy      3. Lumbar spondylosis  Ambulatory referral/consult to Physical/Occupational Therapy              PLAN:   1. Interventional: We discussed trying lumbar MBB, but patient does not seem she wants to move forward with procedures. She would like to discuss further with Dr. Hernandez; will try physical therapy first. She reports having "bad reaction" to interventional pain procedures in the past (once being unable to walk after). She feels that procedures in the past were just put in "random spots"; I assure patient that we use x-ray guided techniques to ensure proper placement although I still cannot guarantee resolution of pain.     2. Pharmacologic:   - Refill Flexeril 10mg QHS PRN - originally from PCP.   - Continue Pamelor 25 mg QHS - although patient reports causes "hand jerks" during the day; she is on other medications, so could be another medication causing this.  - Continue gabapentin 300mg TID- which is helping some. Consider Increase.   - Continue Topamax 100mg BID - from PCP - for epilepsy & migraines.  - Anticoagulation use: None.     3. Rehabilitative: Encouraged regular exercise. Continue exercises and activities as tolerated.     4. Diagnostic:   - Lumbar MRI previously reviewed by Dr. Hernandez (ordered by PCP).   - 2nd Request for imaging, office visit notes, and procedure notes from Miranda Jones MD:  [facility]  Address: [address] [place] [address] , [place] [id number]  Phone: [phone]  [phone]    5. Follow up: 8 week follow-up after physical therapy - virtual visit     -We previously discussed the goals of interventional chronic pain management with the patient. We have previously explained the utility of injections for diagnostic and therapeutic purposes.  We discussed a multimodal approach to pain including treating the patient's given worst pain at any given time.  We will use a " systematic approach to addressing pain.  We will also adopt a multimodal approach that includes injections, adjuvant medications, physical therapy, at times psychiatry.  There may be a limited role for opioid use intermittently in the treatment of pain, more particularly for acute pain although no one approach can be used as a sole treatment modality.  - This condition does not require this patient to take time off of work, and the primary goal of our Pain Management services is to improve the patient's functional capacity.   - I discussed the risks, benefits, and alternatives to potential treatment options. All questions and concerns were fully addressed today in clinic.           Disclaimer:  This note was prepared using voice recognition system and is likely to have sound alike errors that may have been overlooked even after proof reading.  Please call me with any questions.

## 2022-08-31 ENCOUNTER — PATIENT MESSAGE (OUTPATIENT)
Dept: UROLOGY | Facility: CLINIC | Age: 37
End: 2022-08-31
Payer: MEDICAID

## 2022-09-08 ENCOUNTER — OFFICE VISIT (OUTPATIENT)
Dept: INTERNAL MEDICINE | Facility: CLINIC | Age: 37
End: 2022-09-08
Payer: MEDICAID

## 2022-09-08 ENCOUNTER — OFFICE VISIT (OUTPATIENT)
Dept: OPHTHALMOLOGY | Facility: CLINIC | Age: 37
End: 2022-09-08
Payer: MEDICAID

## 2022-09-08 VITALS — HEART RATE: 98 BPM | BODY MASS INDEX: 26.72 KG/M2 | TEMPERATURE: 98 F | WEIGHT: 156.5 LBS | HEIGHT: 64 IN

## 2022-09-08 DIAGNOSIS — H53.9 VISION DISORDER: ICD-10-CM

## 2022-09-08 DIAGNOSIS — R51.9 CHRONIC NONINTRACTABLE HEADACHE, UNSPECIFIED HEADACHE TYPE: Primary | ICD-10-CM

## 2022-09-08 DIAGNOSIS — L98.9 SKIN LESION: ICD-10-CM

## 2022-09-08 DIAGNOSIS — H52.13 MYOPIA OF BOTH EYES WITH ASTIGMATISM: ICD-10-CM

## 2022-09-08 DIAGNOSIS — G43.909 MIGRAINE WITHOUT STATUS MIGRAINOSUS, NOT INTRACTABLE, UNSPECIFIED MIGRAINE TYPE: Primary | ICD-10-CM

## 2022-09-08 DIAGNOSIS — E53.8 B12 DEFICIENCY: ICD-10-CM

## 2022-09-08 DIAGNOSIS — G89.29 CHRONIC NONINTRACTABLE HEADACHE, UNSPECIFIED HEADACHE TYPE: Primary | ICD-10-CM

## 2022-09-08 DIAGNOSIS — T40.2X5A THERAPEUTIC OPIOID-INDUCED CONSTIPATION (OIC): ICD-10-CM

## 2022-09-08 DIAGNOSIS — H52.203 MYOPIA OF BOTH EYES WITH ASTIGMATISM: ICD-10-CM

## 2022-09-08 DIAGNOSIS — K59.00 CONSTIPATION, UNSPECIFIED CONSTIPATION TYPE: ICD-10-CM

## 2022-09-08 DIAGNOSIS — K59.03 THERAPEUTIC OPIOID-INDUCED CONSTIPATION (OIC): ICD-10-CM

## 2022-09-08 DIAGNOSIS — M54.16 LUMBAR RADICULOPATHY, CHRONIC: ICD-10-CM

## 2022-09-08 DIAGNOSIS — N30.00 ACUTE CYSTITIS WITHOUT HEMATURIA: ICD-10-CM

## 2022-09-08 DIAGNOSIS — R30.0 DYSURIA: ICD-10-CM

## 2022-09-08 DIAGNOSIS — T78.40XA ALLERGY, INITIAL ENCOUNTER: ICD-10-CM

## 2022-09-08 PROBLEM — Z00.00 WELLNESS EXAMINATION: Status: ACTIVE | Noted: 2022-09-08

## 2022-09-08 PROCEDURE — 3008F BODY MASS INDEX DOCD: CPT | Mod: CPTII,,, | Performed by: FAMILY MEDICINE

## 2022-09-08 PROCEDURE — 99999 PR PBB SHADOW E&M-EST. PATIENT-LVL V: CPT | Mod: PBBFAC,,, | Performed by: FAMILY MEDICINE

## 2022-09-08 PROCEDURE — 96372 THER/PROPH/DIAG INJ SC/IM: CPT | Mod: PBBFAC,PO

## 2022-09-08 PROCEDURE — 92015 DETERMINE REFRACTIVE STATE: CPT | Mod: ,,, | Performed by: OPTOMETRIST

## 2022-09-08 PROCEDURE — 99214 OFFICE O/P EST MOD 30 MIN: CPT | Mod: S$PBB,,, | Performed by: FAMILY MEDICINE

## 2022-09-08 PROCEDURE — 87077 CULTURE AEROBIC IDENTIFY: CPT | Performed by: FAMILY MEDICINE

## 2022-09-08 PROCEDURE — 99214 PR OFFICE/OUTPT VISIT, EST, LEVL IV, 30-39 MIN: ICD-10-PCS | Mod: S$PBB,,, | Performed by: FAMILY MEDICINE

## 2022-09-08 PROCEDURE — 92004 PR EYE EXAM, NEW PATIENT,COMPREHESV: ICD-10-PCS | Mod: S$PBB,,, | Performed by: OPTOMETRIST

## 2022-09-08 PROCEDURE — 3008F PR BODY MASS INDEX (BMI) DOCUMENTED: ICD-10-PCS | Mod: CPTII,,, | Performed by: FAMILY MEDICINE

## 2022-09-08 PROCEDURE — 99999 PR PBB SHADOW E&M-EST. PATIENT-LVL V: ICD-10-PCS | Mod: PBBFAC,,, | Performed by: FAMILY MEDICINE

## 2022-09-08 PROCEDURE — 99999 PR PBB SHADOW E&M-EST. PATIENT-LVL III: CPT | Mod: PBBFAC,,, | Performed by: OPTOMETRIST

## 2022-09-08 PROCEDURE — 87088 URINE BACTERIA CULTURE: CPT | Performed by: FAMILY MEDICINE

## 2022-09-08 PROCEDURE — 92015 PR REFRACTION: ICD-10-PCS | Mod: ,,, | Performed by: OPTOMETRIST

## 2022-09-08 PROCEDURE — 99215 OFFICE O/P EST HI 40 MIN: CPT | Mod: PBBFAC,27,PO | Performed by: FAMILY MEDICINE

## 2022-09-08 PROCEDURE — 87186 SC STD MICRODIL/AGAR DIL: CPT | Performed by: FAMILY MEDICINE

## 2022-09-08 PROCEDURE — 81001 URINALYSIS AUTO W/SCOPE: CPT | Performed by: FAMILY MEDICINE

## 2022-09-08 PROCEDURE — 92004 COMPRE OPH EXAM NEW PT 1/>: CPT | Mod: S$PBB,,, | Performed by: OPTOMETRIST

## 2022-09-08 PROCEDURE — 99999 PR PBB SHADOW E&M-EST. PATIENT-LVL III: ICD-10-PCS | Mod: PBBFAC,,, | Performed by: OPTOMETRIST

## 2022-09-08 PROCEDURE — 87086 URINE CULTURE/COLONY COUNT: CPT | Performed by: FAMILY MEDICINE

## 2022-09-08 PROCEDURE — 1159F MED LIST DOCD IN RCRD: CPT | Mod: CPTII,,, | Performed by: OPTOMETRIST

## 2022-09-08 PROCEDURE — 1159F PR MEDICATION LIST DOCUMENTED IN MEDICAL RECORD: ICD-10-PCS | Mod: CPTII,,, | Performed by: OPTOMETRIST

## 2022-09-08 PROCEDURE — 99213 OFFICE O/P EST LOW 20 MIN: CPT | Mod: PBBFAC,PO | Performed by: OPTOMETRIST

## 2022-09-08 RX ORDER — CYANOCOBALAMIN 1000 UG/ML
1000 INJECTION, SOLUTION INTRAMUSCULAR; SUBCUTANEOUS ONCE
Status: COMPLETED | OUTPATIENT
Start: 2022-09-08 | End: 2022-09-08

## 2022-09-08 RX ORDER — TOPIRAMATE 100 MG/1
100 TABLET, FILM COATED ORAL DAILY
Qty: 90 TABLET | Refills: 0 | Status: SHIPPED | OUTPATIENT
Start: 2022-09-08 | End: 2022-09-12

## 2022-09-08 RX ORDER — ORPHENADRINE CITRATE 30 MG/ML
60 INJECTION INTRAMUSCULAR; INTRAVENOUS
Status: COMPLETED | OUTPATIENT
Start: 2022-09-08 | End: 2022-09-08

## 2022-09-08 RX ORDER — KETOROLAC TROMETHAMINE 30 MG/ML
60 INJECTION, SOLUTION INTRAMUSCULAR; INTRAVENOUS
Status: COMPLETED | OUTPATIENT
Start: 2022-09-08 | End: 2022-09-08

## 2022-09-08 RX ADMIN — KETOROLAC TROMETHAMINE 60 MG: 60 INJECTION, SOLUTION INTRAMUSCULAR at 12:09

## 2022-09-08 RX ADMIN — CYANOCOBALAMIN 1000 MCG: 1000 INJECTION, SOLUTION INTRAMUSCULAR at 12:09

## 2022-09-08 RX ADMIN — ORPHENADRINE CITRATE 60 MG: 30 INJECTION INTRAMUSCULAR; INTRAVENOUS at 12:09

## 2022-09-08 NOTE — PROGRESS NOTES
Patient started off stating back pain level was 6.  Orphenadrine and ketorolac injection was given. Patient states her pain level is now 4, 15 minutes after injection.

## 2022-09-08 NOTE — PROGRESS NOTES
Subjective:       Patient ID: Simon Reynolds is a 36 y.o. female.    Chief Complaint: Back Pain    Back Pain  This is a chronic problem. The current episode started more than 1 year ago. The problem has been gradually worsening since onset. The pain is present in the lumbar spine. The quality of the pain is described as aching and cramping. The pain does not radiate. The pain is severe. The symptoms are aggravated by bending, position, standing and twisting. Pertinent negatives include no abdominal pain, chest pain or fever.   Review of Systems   Constitutional:  Negative for fever.   HENT:  Negative for congestion.    Eyes:  Negative for discharge.   Respiratory:  Negative for shortness of breath.    Cardiovascular:  Negative for chest pain.   Gastrointestinal:  Negative for abdominal pain.   Genitourinary:  Positive for difficulty urinating.   Musculoskeletal:  Positive for back pain. Negative for joint swelling.   Neurological:  Negative for dizziness.   Psychiatric/Behavioral:  Negative for agitation.      Objective:      Physical Exam  Vitals and nursing note reviewed.   Constitutional:       General: She is not in acute distress.     Appearance: Normal appearance. She is well-developed. She is not diaphoretic.   HENT:      Head: Normocephalic and atraumatic.   Eyes:      General: No scleral icterus.     Conjunctiva/sclera: Conjunctivae normal.   Cardiovascular:      Rate and Rhythm: Normal rate and regular rhythm.   Pulmonary:      Effort: Pulmonary effort is normal. No respiratory distress.      Breath sounds: Normal breath sounds. No wheezing.   Abdominal:      General: There is no distension.      Palpations: Abdomen is soft.      Tenderness: There is no abdominal tenderness. There is no guarding.   Skin:     General: Skin is warm.      Coloration: Skin is not pale.      Findings: No erythema or rash.      Comments: Good turgor   Neurological:      Mental Status: She is alert.   Psychiatric:          Mood and Affect: Mood normal.         Thought Content: Thought content normal.       Assessment:       1. Migraine without status migrainosus, not intractable, unspecified migraine type    2. B12 deficiency    3. Lumbar radiculopathy, chronic    4. Constipation, unspecified constipation type    5. Therapeutic opioid-induced constipation (OIC)    6. Vision disorder    7. Allergy, initial encounter    8. Skin lesion    9. Dysuria    10. Acute cystitis without hematuria          Plan:     Problem List Items Addressed This Visit          Neuro    Migraines - Primary    Relevant Medications    topiramate (TOPAMAX) 100 MG tablet    Lumbar radiculopathy, chronic       Ophtho    Vision disorder    Relevant Orders    Ambulatory referral/consult to Optometry       Derm    Skin lesion    Relevant Orders    Ambulatory referral/consult to Dermatology       Renal/    Dysuria       Endocrine    B12 deficiency       GI    Constipation    Therapeutic opioid-induced constipation (OIC)    Relevant Medications    linaCLOtide (LINZESS) 72 mcg Cap capsule       Other    Allergies    Relevant Orders    Ambulatory referral/consult to Allergy     Other Visit Diagnoses       Acute cystitis without hematuria        Relevant Orders    CT Abdomen Pelvis W Wo Contrast    Urinalysis, Reflex to Urine Culture Urine, Clean Catch (Completed)

## 2022-09-09 LAB
BACTERIA #/AREA URNS AUTO: ABNORMAL /HPF
BILIRUB UR QL STRIP: NEGATIVE
CLARITY UR REFRACT.AUTO: CLEAR
COLOR UR AUTO: YELLOW
GLUCOSE UR QL STRIP: NEGATIVE
HGB UR QL STRIP: ABNORMAL
KETONES UR QL STRIP: NEGATIVE
LEUKOCYTE ESTERASE UR QL STRIP: ABNORMAL
MICROSCOPIC COMMENT: ABNORMAL
NITRITE UR QL STRIP: NEGATIVE
PH UR STRIP: 6 [PH] (ref 5–8)
PROT UR QL STRIP: ABNORMAL
RBC #/AREA URNS AUTO: 2 /HPF (ref 0–4)
SP GR UR STRIP: 1.01 (ref 1–1.03)
SQUAMOUS #/AREA URNS AUTO: 0 /HPF
URN SPEC COLLECT METH UR: ABNORMAL
WBC #/AREA URNS AUTO: 22 /HPF (ref 0–5)

## 2022-09-09 NOTE — PROGRESS NOTES
HPI    NP to DKT  Patient here today for yearly eye exam  Referred by Dr. Cross  Vision changes since last eye exam?: Yes at distance  Wears SVL glasses full-time     Any eye pain today: No    Other ocular symptoms: Migraines    Interested in contact lens fitting today? No              Last edited by Malathi Jones, PCT on 9/8/2022  1:16 PM.            Assessment /Plan     For exam results, see Encounter Report.    Chronic nonintractable headache, unspecified headache type  Not likely ocular in origin  Minimal refractive error  Normal, well-perfused optic nerves bilaterally with no edema  Continue care with PCP and/or specialists     Myopia of both eyes with astigmatism  Eyeglass Final Rx       Eyeglass Final Rx         Sphere Cylinder Axis    Right -2.00 +2.75 172    Left -2.25 +2.50 020      Type: SVL    Expiration Date: 9/8/2023   60 PD                    Vision disorder  -     Ambulatory referral/consult to Optometry    RTC 1 yr for dilated eye exam or sooner if any changes to vision.   Discussed above and answered questions.

## 2022-09-11 LAB — BACTERIA UR CULT: ABNORMAL

## 2022-09-12 DIAGNOSIS — N30.00 ACUTE CYSTITIS WITHOUT HEMATURIA: Primary | ICD-10-CM

## 2022-09-12 PROBLEM — Z00.00 WELLNESS EXAMINATION: Status: RESOLVED | Noted: 2022-09-08 | Resolved: 2022-09-12

## 2022-09-12 RX ORDER — CIPROFLOXACIN 500 MG/1
500 TABLET ORAL EVERY 12 HOURS
Qty: 14 TABLET | Refills: 0 | Status: SHIPPED | OUTPATIENT
Start: 2022-09-12 | End: 2023-01-24

## 2022-09-20 DIAGNOSIS — M79.18 LUMBAR MUSCLE PAIN: ICD-10-CM

## 2022-09-20 RX ORDER — CYCLOBENZAPRINE HCL 10 MG
10 TABLET ORAL NIGHTLY PRN
Qty: 30 TABLET | Refills: 0 | Status: SHIPPED | OUTPATIENT
Start: 2022-09-20 | End: 2022-11-10 | Stop reason: SDUPTHER

## 2022-09-20 NOTE — TELEPHONE ENCOUNTER
Can you refill?    Last Visit: 8/29/22 sigrid      Next Visit: 11/10/22 sigrid  Last refill: 8/29/22

## 2022-11-09 ENCOUNTER — TELEPHONE (OUTPATIENT)
Dept: PAIN MEDICINE | Facility: CLINIC | Age: 37
End: 2022-11-09
Payer: MEDICAID

## 2022-11-10 ENCOUNTER — OFFICE VISIT (OUTPATIENT)
Dept: PAIN MEDICINE | Facility: CLINIC | Age: 37
End: 2022-11-10
Payer: MEDICAID

## 2022-11-10 VITALS — RESPIRATION RATE: 17 BRPM

## 2022-11-10 DIAGNOSIS — M79.18 LUMBAR MUSCLE PAIN: Primary | ICD-10-CM

## 2022-11-10 DIAGNOSIS — M54.16 LUMBAR RADICULOPATHY, CHRONIC: ICD-10-CM

## 2022-11-10 DIAGNOSIS — M47.816 LUMBAR SPONDYLOSIS: ICD-10-CM

## 2022-11-10 PROCEDURE — 99214 PR OFFICE/OUTPT VISIT, EST, LEVL IV, 30-39 MIN: ICD-10-PCS | Mod: 95,,, | Performed by: PHYSICIAN ASSISTANT

## 2022-11-10 PROCEDURE — 1159F PR MEDICATION LIST DOCUMENTED IN MEDICAL RECORD: ICD-10-PCS | Mod: CPTII,95,, | Performed by: PHYSICIAN ASSISTANT

## 2022-11-10 PROCEDURE — 1160F PR REVIEW ALL MEDS BY PRESCRIBER/CLIN PHARMACIST DOCUMENTED: ICD-10-PCS | Mod: CPTII,95,, | Performed by: PHYSICIAN ASSISTANT

## 2022-11-10 PROCEDURE — 1160F RVW MEDS BY RX/DR IN RCRD: CPT | Mod: CPTII,95,, | Performed by: PHYSICIAN ASSISTANT

## 2022-11-10 PROCEDURE — 1159F MED LIST DOCD IN RCRD: CPT | Mod: CPTII,95,, | Performed by: PHYSICIAN ASSISTANT

## 2022-11-10 PROCEDURE — 99214 OFFICE O/P EST MOD 30 MIN: CPT | Mod: 95,,, | Performed by: PHYSICIAN ASSISTANT

## 2022-11-10 RX ORDER — GABAPENTIN 400 MG/1
400 CAPSULE ORAL 3 TIMES DAILY
Qty: 90 CAPSULE | Refills: 2 | Status: SHIPPED | OUTPATIENT
Start: 2022-11-10 | End: 2023-02-01 | Stop reason: SDUPTHER

## 2022-11-10 RX ORDER — CYCLOBENZAPRINE HCL 10 MG
5-10 TABLET ORAL 2 TIMES DAILY PRN
Qty: 30 TABLET | Refills: 2 | Status: SHIPPED | OUTPATIENT
Start: 2022-11-10 | End: 2022-11-26 | Stop reason: SDUPTHER

## 2022-11-10 NOTE — PROGRESS NOTES
The patient location is: home  The chief complaint leading to consultation is: chronic pain     Visit type: audiovisual    Face to Face time with patient: 10-15 minutes  20 minutes of total time spent on the encounter, which includes face to face time and non-face to face time preparing to see the patient (eg, review of tests), Obtaining and/or reviewing separately obtained history, Documenting clinical information in the electronic or other health record, Independently interpreting results (not separately reported) and communicating results to the patient/family/caregiver, or Care coordination (not separately reported).     Each patient to whom he or she provides medical services by telemedicine is:  (1) informed of the relationship between the physician and patient and the respective role of any other health care provider with respect to management of the patient; and (2) notified that he or she may decline to receive medical services by telemedicine and may withdraw from such care at any time.        Established  Patient Chronic Pain Note (Follow-up  Visit)    Referring Physician: Darion Cross MD    PCP: Darion Cross MD    Chief Complaint:   Chief Complaint   Patient presents with    Back Pain   With BLE radicular pain to feet       SUBJECTIVE:    Interval History (11/10/2022):  Simon Reynolds presents today for follow-up visit.  Patient was last seen on 8/29/2022. At that visit, the plan was to start physical therapy. She will start going next month. She feels the pain is worsening and may be more open to procedures now. She feels more comfortable with our clinic than she has at her previous practice. Patient reports pain as 9/10 today.    Interval History (8/29/2022):  Simon Reynolds presents today for follow-up visit.  Patient was last seen about 3 weeks ago. She presents today on telemedicine visit to discuss MRI results (ordered by PCP). She c/o low back pain + BLE radiculopathy. She reports  "that she is uninterested in procedures because of "bad reactions in the past." She states that she refused physical therapy while she was on WC because "no one would tell her what was wrong with her". She is inquisitive about her condition today as well.       Initial HPI (8/10/22):  Simon Reynolds is a 36 y.o. female who presents to the clinic for the evaluation of lower back and leg pain.   She was referred by her primary care provider for further evaluation and management of this pain.  She has a past medical history of migraines, epilepsy, and multiple other medical comorbidities as listed in her chart.  The pain started 2-3 years ago following a work related injury and symptoms have been unchanged.The pain is located in the lumbosacral area and radiates to the lower extremities.  The pain is described as  Sharp, stabbing, throbbing, aching, electric, numbness/tingling  and is rated as 7/10. The pain is rated with a score of  6/10 on the BEST day and a score of 10/10 on the WORST day.  Symptoms interfere with daily activity. The pain is exacerbated by bending, squatting.  The pain is mitigated by heat/ice and laying down.  Patient denies night fever/night sweats, urinary incontinence, bowel incontinence, significant weight loss, significant motor weakness and loss of sensations.    Pain Disability Index Review:  Last 3 PDI Scores 8/10/2022   Pain Disability Index (PDI) 49       Non-Pharmacologic Treatments:  Physical Therapy/Home Exercise: yes  Ice/Heat:yes  TENS: yes  Acupuncture: no  Massage: yes  Chiropractic: no    Other: no      Pain Medications:  - Opioids: Tramadol, Norco, Percocet, methadone  - Adjuvant Medications: NSAIDs, gabapentin, Cymbalta, Tylenol, baclofen, Zanaflex, Flexeril, lidocaine patch, Celebrex, amitriptyline, nortriptyline, Topamax, Voltaren gel  - Anti-Coagulants: None      Pain Procedures:   Externally: Previous cervical and lumbar interventions - per reporting: had several (>5) lum " "ESIs & reports having "bad reaction" to interventional pain procedures in the past (once being unable to walk after)      Imaging (Reviewed on 11/10/2022):     8/24/2022 MRI Lumbar Spine Without Contrast  FINDINGS:  The distal cord and conus appear intrinsically normal.    The cauda equina nerve roots appear normal    Partially included on this exam is a fluid signal intensity structure in the right lateral epidural space at the T11 and T12 vertebral body level.  The lesion displays fluid signal intensity characteristics and extends into the right T12-L1 neural foramen.  There is mild chronic remodeling of the posterior aspect of the T12 vertebral body and right T12 pedicle.  Approximate size 12.3 x 4.8 mm transversely with craniocaudal length of 33.9 mm.  Probably represents a chronic benign fluid signal intensity collection such as an arachnoid cyst or perineural cyst/diverticulum.    T12-L1: Disc level unremarkable.    L1-2:     The disc level is unremarkable.    L2-3:     The disc level is unremarkable.    L3-4:     Minor facet arthrosis.    L4-5:     Minor disc desiccation with annular bulge.  Moderate left and mild right hypertrophic facet arthrosis.    L5-S1:    Unremarkable.  Impression:   Minor L4-5 disc bulge with moderate left and mild right facet arthrosis.  Mild foraminal stenosis.  Fluid signal intensity structure at the right T11/T12 level most consistent with benign fluid collection such as an arachnoid cyst or perineural diverticulum.  See above for detail.      CT abdomen pelvis 10/09/2021:  The visualized lung bases are clear. The heart is normal in size.     The right hepatic lobe measures 19.5 cm in span. The left hepatic lobe measures 7.9 cm in span. The spleen is normal in size. The liver and spleen are normal in density.     There is some linear increased density dependently in the gallbladder which is probably related to a small fold in the gallbladder rather than representing any " gallstones. Otherwise, the gallbladder and biliary ductal system are normal.     The pancreas, adrenal glands, and right kidney are normal. One or two small cortical cysts are seen anteriorly in the left upper pole renal cortex. There is no obvious renal or ureteral calculus or hydronephrosis.     The anterior wall of the urinary bladder appears slightly thickened, which, at least in part, may be related to the partially decompressed state of the urinary bladder.     The uterus is midline and anteverted. The large right ovarian cyst seen on the previous CT has resolved in the interim, with no abnormal ovarian mass currently seen. There is no free fluid within the pelvis and no ascites is noted. No adenopathy is seen.     The appendix is normal. No abnormal bowel distention or bowel wall thickening is noted. There are multiple small diverticula along the descending colon and at the junction of the descending colon and sigmoid colon. No diverticulitis.     The regional skeleton is intact. No fracture or dislocation is evident. No lytic or blastic bone lesion is seen.            Review of Systems:   GENERAL:  No weight loss, malaise or fevers.  HEENT:   No recent changes in vision or hearing  NECK:  Negative for lumps, no difficulty with swallowing.  RESPIRATORY:  Negative for cough, wheezing or shortness of breath, patient denies any recent URI.  CARDIOVASCULAR:  Negative for chest pain, leg swelling or palpitations.  GI:  Negative for abdominal discomfort, blood in stools or black stools or change in bowel habits.  MUSCULOSKELETAL:  See HPI.  SKIN:  Negative for lesions, rash, and itching.  PSYCH:  No mood disorder or recent psychosocial stressors.  Patients sleep is not disturbed secondary to pain.  HEMATOLOGY/LYMPHOLOGY:  Negative for prolonged bleeding, bruising easily or swollen nodes.  Patient is not currently taking any anti-coagulants  NEURO:   No history of headaches, syncope, paralysis, seizures or  tremors.  All other reviewed and negative other than HPI.      OBJECTIVE:    Telemedicine Exam  Vitals:    11/10/22 1105   Resp: 17     There is no height or weight on file to calculate BMI.   (reviewed on 11/10/2022)     GENERAL: Well appearing, in no acute distress, alert and oriented x3.  Cooperative.  PSYCH:  Mood and affect appropriate.  SKIN: Skin color & texture with no obvious abnormalities.    HEAD/FACE:  Normocephalic, atraumatic.    PULM:  No difficulty breathing.  No obvious wheezing.  EXTREMITIES: No obvious deformities. Moving all extremities well, appears to have symmetric strength throughout.  MUSCULOSKELETAL: No obvious atrophy abnormalities are noted.   NEURO: No obvious neurologic deficit.   GAIT: sitting.     Physical Exam: last in clinic visit:  GENERAL: Well appearing, in no acute distress, alert and oriented x3.  PSYCH:  Mood and affect appropriate.  SKIN: Skin color, texture, turgor normal, no rashes or lesions.  HEAD/FACE:  Normocephalic, atraumatic. Cranial nerves grossly intact.  CV: RRR with palpation of the radial artery.  PULM: No evidence of respiratory difficulty, symmetric chest rise.  GI:  Soft and non-tender.  BACK: Straight leg raising in the sitting and supine positions is equivocal to radicular pain.  Mild-to-moderate pain to palpation over the facet joints of the lumbar spine and lumbar paraspinals.  Limited range of motion secondary to pain reproduction.  EXTREMITIES: Peripheral joint ROM is full and pain free without obvious instability or laxity in all four extremities. No deformities, edema, or skin discoloration. Good capillary refill.  MUSCULOSKELETAL: Shoulder, hip, and knee provocative maneuvers are negative.  There is no pain with palpation over the sacroiliac joints bilaterally.  FABERs test is negative.  FADIRs test is negative.   Bilateral upper and lower extremity strength is normal and symmetric.  No atrophy or tone abnormalities are noted.  NEURO: Bilateral upper  "and lower extremity coordination and muscle stretch reflexes are physiologic and symmetric.  Plantar response are downgoing. No clonus.  No loss of sensation is noted.  GAIT: normal.            ASSESSMENT: 36 y.o. year old female with  lower back pain, consistent with     1. Lumbar muscle pain  cyclobenzaprine (FLEXERIL) 10 MG tablet      2. Lumbar radiculopathy, chronic  gabapentin (NEURONTIN) 400 MG capsule      3. Lumbar spondylosis                  PLAN:   1. Interventional: We discussed trying lumbar MBB, but patient has been hesitant to move forward with procedures, although more open than she was previously. She would like to discuss further with Dr. eHrnandez; will try physical therapy first. She reports having "bad reaction" to interventional pain procedures in the past (once being unable to walk after "block"). She feels that procedures in the past were just put in "random spots"; we have assured patient that we use x-ray guided techniques to ensure proper placement although I still cannot guarantee resolution of pain.     2. Pharmacologic:   - Refill Flexeril 10mg QHS- BID PRN - originally from PCP.   - Refill and Increase gabapentin 300mg TID to 400mg TID. Consider further Increase to gabapentin 600mg TID if warranted.  - Continue Pamelor 25 mg QHS.  - Continue Topamax 100mg BID - from PCP - for epilepsy & migraines.  - Anticoagulation use: None.     3. Rehabilitative: Start PT with passive modalities, including ice and heat, and active modalities, including a regimen for stretching and strengthening.  Order sent internally.     4. Diagnostic:   - Lumbar MRI previously reviewed by Dr. Hernandez (ordered by PCP).   - 2nd Request for imaging, office visit notes, and procedure notes from Miranda Jones MD ([facility]  , [place] [address]: [phone]). Nothing received.    5. Follow up:  2-3 months after physical therapy completion    -We previously " discussed the goals of interventional chronic pain management with the patient. We have previously explained the utility of injections for diagnostic and therapeutic purposes.  We discussed a multimodal approach to pain including treating the patient's given worst pain at any given time.  We will use a systematic approach to addressing pain.  We will also adopt a multimodal approach that includes injections, adjuvant medications, physical therapy, at times psychiatry.  There may be a limited role for opioid use intermittently in the treatment of pain, more particularly for acute pain although no one approach can be used as a sole treatment modality.  - This condition does not require this patient to take time off of work, and the primary goal of our Pain Management services is to improve the patient's functional capacity.   - I discussed the risks, benefits, and alternatives to potential treatment options. All questions and concerns were fully addressed today in clinic.           Disclaimer:  This note was prepared using voice recognition system and is likely to have sound alike errors that may have been overlooked even after proof reading.  Please call me with any questions.

## 2022-12-06 ENCOUNTER — PATIENT MESSAGE (OUTPATIENT)
Dept: PAIN MEDICINE | Facility: CLINIC | Age: 37
End: 2022-12-06
Payer: MEDICAID

## 2022-12-06 DIAGNOSIS — M79.18 LUMBAR MUSCLE PAIN: Primary | ICD-10-CM

## 2022-12-06 DIAGNOSIS — G89.29 ACUTE EXACERBATION OF CHRONIC LOW BACK PAIN: ICD-10-CM

## 2022-12-06 DIAGNOSIS — M54.16 LUMBAR RADICULOPATHY, CHRONIC: ICD-10-CM

## 2022-12-06 DIAGNOSIS — M54.50 ACUTE EXACERBATION OF CHRONIC LOW BACK PAIN: ICD-10-CM

## 2022-12-06 DIAGNOSIS — M47.816 LUMBAR SPONDYLOSIS: ICD-10-CM

## 2022-12-06 RX ORDER — METHYLPREDNISOLONE 4 MG/1
TABLET ORAL
Qty: 21 EACH | Refills: 0 | Status: SHIPPED | OUTPATIENT
Start: 2022-12-06 | End: 2023-05-05 | Stop reason: SDUPTHER

## 2023-01-09 ENCOUNTER — CLINICAL SUPPORT (OUTPATIENT)
Dept: REHABILITATION | Facility: HOSPITAL | Age: 38
End: 2023-01-09
Payer: MEDICAID

## 2023-01-09 DIAGNOSIS — R68.89 DECREASED STRENGTH, ENDURANCE, AND MOBILITY: ICD-10-CM

## 2023-01-09 DIAGNOSIS — R53.1 DECREASED STRENGTH, ENDURANCE, AND MOBILITY: ICD-10-CM

## 2023-01-09 DIAGNOSIS — M54.41 CHRONIC BILATERAL LOW BACK PAIN WITH BILATERAL SCIATICA: ICD-10-CM

## 2023-01-09 DIAGNOSIS — R29.3 POSTURE ABNORMALITY: ICD-10-CM

## 2023-01-09 DIAGNOSIS — G89.29 CHRONIC BILATERAL LOW BACK PAIN WITH BILATERAL SCIATICA: ICD-10-CM

## 2023-01-09 DIAGNOSIS — M54.42 CHRONIC BILATERAL LOW BACK PAIN WITH BILATERAL SCIATICA: ICD-10-CM

## 2023-01-09 DIAGNOSIS — M79.18 LUMBAR MUSCLE PAIN: ICD-10-CM

## 2023-01-09 DIAGNOSIS — M54.16 LUMBAR RADICULOPATHY, CHRONIC: ICD-10-CM

## 2023-01-09 DIAGNOSIS — M47.816 LUMBAR SPONDYLOSIS: ICD-10-CM

## 2023-01-09 DIAGNOSIS — Z74.09 DECREASED STRENGTH, ENDURANCE, AND MOBILITY: ICD-10-CM

## 2023-01-09 PROCEDURE — 97162 PT EVAL MOD COMPLEX 30 MIN: CPT

## 2023-01-09 NOTE — PLAN OF CARE
"[facility]   Physical Therapy Initial Evaluation     Date: 1/9/2023   Name: Simon Enriquez [facility] Number: [id number]    Therapy Diagnosis:   Encounter Diagnoses   Name Primary?    Lumbar muscle pain     Lumbar radiculopathy, chronic     Lumbar spondylosis      Physician: Lashawn Morgan PA*    Physician Orders: PT Eval and Treat   Medical Diagnosis from Referral:   Lumbar muscle pain   Lumbar radiculopathy, chronic   Lumbar spondylosis     Evaluation Date: 1/9/2023  Authorization Period Expiration: 12/6/2023  Plan of Care Expiration: 4/9/2023  Progress Note Due: 2/9/2023  Visit # / Visits authorized: 1/ 1   FOTO: 1/ 3     Precautions: Standard    Time In: 0830  Time Out: 0915  Total Appointment Time (timed & untimed codes): 45 minutes    SUBJECTIVE   Date of onset: 2022    History of current condition - Simon reports: that she has experienced lower back pain for 3 years.  She reports that she initially injured her lower back while working and lifting on objects while working. Multiple incidents with lower back pain from the heavy lifting of the job.  She went through multiple workman's comp. During her last injury, she lost function of her bladder.  Multiple injection but no referral into PT.  No significant relief.  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  The patient also reports that she has been unable to reutrn to the gym over the last few years due to fear of re-injuring the lower back.    Falls: None    Imaging, MRI of lumbar spine revealed:    "FINDINGS:  The distal cord and conus appear intrinsically normal.     The cauda equina nerve roots appear normal     Partially included on this exam is a fluid signal intensity structure in the right lateral epidural space at the T11 and T12 vertebral body level.  The lesion displays fluid signal intensity characteristics and extends " "into the right T12-L1 neural foramen.  There is mild chronic remodeling of the posterior aspect of the T12 vertebral body and right T12 pedicle.  Approximate size 12.3 x 4.8 mm transversely with craniocaudal length of 33.9 mm.  Probably represents a chronic benign fluid signal intensity collection such as an arachnoid cyst or perineural cyst/diverticulum.     T12-L1: Disc level unremarkable.     L1-2:     The disc level is unremarkable.     L2-3:     The disc level is unremarkable.     L3-4:     Minor facet arthrosis.     L4-5:     Minor disc desiccation with annular bulge.  Moderate left and mild right hypertrophic facet arthrosis.     L5-S1:    Unremarkable.     Impression:     Minor L4-5 disc bulge with moderate left and mild right facet arthrosis.  Mild foraminal stenosis.     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    Prior Level of Function: Independent but does report of lower back pain as a part of her life  Current Level of Function: Some activities are painful with lifting while working, attempting to go back to school for mortuary services which may prevent patient from the amount of lifting that she is performing    Pain:  Current 5/10, worst 9/10, best 4/10   Location:  bilateral lumbar spine and bilateral gluteal regions (left gluteal pain worse than right); also superficial left "skin tearing" pain on palpation at times  Description: Aching, Dull, and Superficial  Aggravating Factors: Lifting, sleeping at times, prolonged sitting and standing  Easing Factors: pain medication; does perform some exercises at home    Patients goals: Decreased " pain when lifting and performance job related activities     Medical History:   Past Medical History:   Diagnosis Date    Breast mass     Encounter for hepatitis C virus screening test for high risk patient 8/4/2020    Epilepsy     Hepatitis C antibody positive in blood     History of alcohol abuse     History of drug abuse     Migraine     Ovarian cyst     Scoliosis     Spine curvature, acquired     Spine disorder     Therapeutic opioid-induced constipation (OIC) 10/15/2020    Wellness examination 9/8/2022       Surgical History:   Simon Reynolds  has a past surgical history that includes Surgical removal of mass of upper extremity; Repair of hiatal hernia using mesh; Epidural steroid injection; Robotic assisted hysterectomy (N/A, 12/15/2021); and Robot-assisted salpingectomy (N/A, 12/15/2021).    Medications:   Simon has a current medication list which includes the following prescription(s): acetaminophen, biotin, ciprofloxacin hcl, cyclobenzaprine, gabapentin, ibuprofen, linaclotide, melatonin, methylprednisolone, multivit with minerals/lutein, naproxen, nortriptyline, oxycodone-acetaminophen, topiramate, and tramadol.    Allergies:   Review of patient's allergies indicates:   Allergen Reactions    Sulfa (sulfonamide antibiotics) Anaphylaxis, Hives, Itching and Other (See Comments)          OBJECTIVE     CMS Impairment/Limitation/Restriction for FOTO Orthopedic Lumbar Survey    Therapist reviewed FOTO scores for Simon Reynolds on 1/9/2023.   FOTO documents entered into [employer] - see Media section.              Posture/Structure: Pt presents PC symmetrical, left PSIS higher than Right, PC even, kyphotic thoracic    Gait: Forward bent kyphotic thoracic posture noted    Squats: 90% no complaint of pain    AROM:    Thoracolumbar  (single inclinometer at L4/5) AROM  (degrees) Pain/Dysfunction with Movement   Flexion 40 Pain significant lower back   Extension 12 Pain but not as bad as FB   Right side bending 30  Pain left side   Left side bending 32    Right rotation 100%    Left rotation 90%        Hip Right Left Pain/Dysfunction with Movement   Hip flex 116 125    Hip ER (supine) 52 61    Hip IR (supine) 22 35    Hip Ext (sidelying) +7 +6      Strength:     L/E MMT Right Left Pain/Dysfunction with Movement   Hip Flexion 5/5 5/5    Hip Extension 4+/5 4-/5    Hip Abd 4+/5 3+/5    Hip Add 5/5 4/5    Hip IR NT NT    Hip ER NT NT    Knee Flexion 5/5 5/5    Knee Extension 5/5 5/5    Ankle DF 5/5 5/5    Ankle PF 5/5 5/5    Big Toe Extension 5/5 5/5          PIVM Lumbar/Thoracic: Pain and normal mobility L2-L3, L3-L4, L4-L5, Mild hypomobility L5-S1 with complaint of pain   Neural Tension Test: Mild symptoms with sural and tibial nerve but more of a popliteal stretch        Palpation: Mild complaint of pain at the left lumbar paraspinal and bilateral glut med, max, and hip rotator musculature        TREATMENT     Total Treatment time (time-based codes) separate from Evaluation: 5 minutes      Simon received the treatments listed below:      ,eaLower Trunk rotations,THERAPEUTIC EXERCISES to develop strength, endurance, ROM, flexibility, posture, and core stabilization for (5) minutes including:    Intervention Performed Today    bike     Lower trunk rotation     Prayer stretch all planes     Pelvic tilts with bilateral knee to chest then straight leg raise - using pelvic floor musculature                           Plan for Next Visit:         PATIENT EDUCATION AND HOME EXERCISES     Education provided:   - lower trunk rotation, prayer stretches, pelvic tilts    Written Home Exercises Provided: yes. Exercises were reviewed and Simon was able to demonstrate them prior to the end of the session.  Simon demonstrated fair  understanding of the education provided. See EMR under Patient Instructions for exercises provided during therapy sessions.    ASSESSMENT     Simon is a 37 y.o. 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    Pt prognosis is Good, , if patient is consistent and compliant with PT and home exercise program.   Pt will benefit from skilled outpatient Physical Therapy to address the deficits stated above and in the chart below, provide pt/family education, and to maximize pt's level of independence.     Plan of care discussed with patient: Yes  Pt's spiritual, cultural and educational needs considered and patient is agreeable to the plan of care and goals as stated below:     Anticipated Barriers for therapy: Arthritis, Back pain, BMI over 30, Headaches, Incontinence, Previous accidents,  Seizures    Medical Necessity is demonstrated by the following  History  Co-morbidities and personal factors that may impact the plan of care Co-morbidities:   Arthritis, Back pain, BMI over 30, Headaches, Incontinence, Previous accidents,  Seizures    Personal Factors:   no deficits     moderate   Examination  Body Structures and Functions, activity limitations and participation restrictions that may impact the plan of care Body Regions:   back  lower extremities    Body Systems:    ROM  strength  motor control  joint mobility, muscle tone, muscle length    Participation Restrictions:   See current level of function listed above     Activity limitations:   Learning and applying knowledge  no deficits    General Tasks and Commands  no  deficits    Communication  no deficits    Mobility  lifting and carrying objects  driving (bike, car, motorcycle)    Self care  no deficits    Domestic Life  shopping  doing house work (cleaning house, washing dishes, laundry)    Interactions/Relationships  no deficits    Life Areas  employment    Community and Social Life  community life  recreation and leisure         moderate   Clinical Presentation evolving clinical presentation with changing clinical characteristics moderate   Decision Making/ Complexity Score: moderate     Goals: Reviewed:1/9/2023    Short Term Goals: In 4 weeks   1.Patient to be educated on HEP.  2. Patient  to increase lumbar ROM to FB 60 deg and bilateral rotation 100% with minimal to no complaint of pain  3. Patient  to increase hip AROM to flexion 120 deg, B rot 60 deg, and bilateral extension to +10 deg for normalized gait pattern and upright standing without complaint of pain.  4. Patient  to increase core muscle strength to 4/5, in order to improve squat strength needed for proper lifting techniques in her job duties  5. Patient   to have pain less than 2/10 at worst, to improve QOL.  6. Patient  to improve score on the FOTO, to improve QOL.  7. Patient  to be educated on postural/body mechanics awareness.    Long Term Goals: In 10 weeks  1.Patient to improve score on the FOTO to 44% or less, to improve QOL.  2. Patient to demo increase in LE strength to 4+/5, in order to improve squat strength needed for proper lifting techniques in her job duties  3. Patient to have decreased pain to 2/10 at worst, to improve QOL.  4. Patient  to increase lumbar ROM to FB 60 deg and bilateral rotation 100% with minimal to no complaint of pain  5. Patient  to increase hip AROM to flexion 120 deg, B rot 60 deg, and bilateral extension to +10 deg for normalized gait pattern and upright standing without complaint of pain.  6. Patient to perform daily activities including job required lifting activities  and return to full gym regimen with symptoms of 2/10 or less        PLAN     Plan of care Certification: 1/9/2023 to 4/9/2023.    Outpatient Physical Therapy 1-2 times weekly for 0 weeks to include the following interventions: Manual Therapy, Moist Heat/ Ice, Neuromuscular Re-ed, Patient Education, Self Care, Therapeutic Activities, and Therapeutic Exercise.     Edgardo Wright, PT      I CERTIFY THE NEED FOR THESE SERVICES FURNISHED UNDER THIS PLAN OF TREATMENT AND WHILE UNDER MY CARE   Physician's comments:     Physician's Signature: ___________________________________________________

## 2023-01-10 PROBLEM — G89.29 CHRONIC LOW BACK PAIN WITH BILATERAL SCIATICA: Status: ACTIVE | Noted: 2023-01-10

## 2023-01-10 PROBLEM — M54.41 CHRONIC LOW BACK PAIN WITH BILATERAL SCIATICA: Status: ACTIVE | Noted: 2023-01-10

## 2023-01-10 PROBLEM — R29.3 POSTURE ABNORMALITY: Status: ACTIVE | Noted: 2023-01-10

## 2023-01-10 PROBLEM — M54.42 CHRONIC LOW BACK PAIN WITH BILATERAL SCIATICA: Status: ACTIVE | Noted: 2023-01-10

## 2023-01-10 PROBLEM — R68.89 DECREASED STRENGTH, ENDURANCE, AND MOBILITY: Status: ACTIVE | Noted: 2023-01-10

## 2023-01-10 PROBLEM — Z74.09 DECREASED STRENGTH, ENDURANCE, AND MOBILITY: Status: ACTIVE | Noted: 2023-01-10

## 2023-01-10 PROBLEM — R53.1 DECREASED STRENGTH, ENDURANCE, AND MOBILITY: Status: ACTIVE | Noted: 2023-01-10

## 2023-01-17 ENCOUNTER — TELEPHONE (OUTPATIENT)
Dept: PAIN MEDICINE | Facility: CLINIC | Age: 38
End: 2023-01-17
Payer: MEDICAID

## 2023-01-18 ENCOUNTER — OFFICE VISIT (OUTPATIENT)
Dept: PAIN MEDICINE | Facility: CLINIC | Age: 38
End: 2023-01-18
Payer: MEDICAID

## 2023-01-18 VITALS
HEIGHT: 64 IN | HEART RATE: 105 BPM | WEIGHT: 158.75 LBS | SYSTOLIC BLOOD PRESSURE: 112 MMHG | BODY MASS INDEX: 27.1 KG/M2 | DIASTOLIC BLOOD PRESSURE: 73 MMHG

## 2023-01-18 DIAGNOSIS — M54.16 LUMBAR RADICULOPATHY, CHRONIC: Primary | ICD-10-CM

## 2023-01-18 DIAGNOSIS — M47.816 LUMBAR SPONDYLOSIS: ICD-10-CM

## 2023-01-18 DIAGNOSIS — M79.18 LUMBAR MUSCLE PAIN: ICD-10-CM

## 2023-01-18 PROCEDURE — 3078F DIAST BP <80 MM HG: CPT | Mod: CPTII,,, | Performed by: PHYSICAL MEDICINE & REHABILITATION

## 2023-01-18 PROCEDURE — 1159F PR MEDICATION LIST DOCUMENTED IN MEDICAL RECORD: ICD-10-PCS | Mod: CPTII,,, | Performed by: PHYSICAL MEDICINE & REHABILITATION

## 2023-01-18 PROCEDURE — 99214 OFFICE O/P EST MOD 30 MIN: CPT | Mod: S$PBB,,, | Performed by: PHYSICAL MEDICINE & REHABILITATION

## 2023-01-18 PROCEDURE — 3074F PR MOST RECENT SYSTOLIC BLOOD PRESSURE < 130 MM HG: ICD-10-PCS | Mod: CPTII,,, | Performed by: PHYSICAL MEDICINE & REHABILITATION

## 2023-01-18 PROCEDURE — 3008F PR BODY MASS INDEX (BMI) DOCUMENTED: ICD-10-PCS | Mod: CPTII,,, | Performed by: PHYSICAL MEDICINE & REHABILITATION

## 2023-01-18 PROCEDURE — 3078F PR MOST RECENT DIASTOLIC BLOOD PRESSURE < 80 MM HG: ICD-10-PCS | Mod: CPTII,,, | Performed by: PHYSICAL MEDICINE & REHABILITATION

## 2023-01-18 PROCEDURE — 1159F MED LIST DOCD IN RCRD: CPT | Mod: CPTII,,, | Performed by: PHYSICAL MEDICINE & REHABILITATION

## 2023-01-18 PROCEDURE — 99213 OFFICE O/P EST LOW 20 MIN: CPT | Mod: PBBFAC | Performed by: PHYSICAL MEDICINE & REHABILITATION

## 2023-01-18 PROCEDURE — 3074F SYST BP LT 130 MM HG: CPT | Mod: CPTII,,, | Performed by: PHYSICAL MEDICINE & REHABILITATION

## 2023-01-18 PROCEDURE — 3008F BODY MASS INDEX DOCD: CPT | Mod: CPTII,,, | Performed by: PHYSICAL MEDICINE & REHABILITATION

## 2023-01-18 PROCEDURE — 99999 PR PBB SHADOW E&M-EST. PATIENT-LVL III: ICD-10-PCS | Mod: PBBFAC,,, | Performed by: PHYSICAL MEDICINE & REHABILITATION

## 2023-01-18 PROCEDURE — 99999 PR PBB SHADOW E&M-EST. PATIENT-LVL III: CPT | Mod: PBBFAC,,, | Performed by: PHYSICAL MEDICINE & REHABILITATION

## 2023-01-18 PROCEDURE — 99214 PR OFFICE/OUTPT VISIT, EST, LEVL IV, 30-39 MIN: ICD-10-PCS | Mod: S$PBB,,, | Performed by: PHYSICAL MEDICINE & REHABILITATION

## 2023-01-18 RX ORDER — IBUPROFEN 600 MG/1
600 TABLET ORAL 3 TIMES DAILY PRN
Qty: 90 TABLET | Refills: 1 | Status: SHIPPED | OUTPATIENT
Start: 2023-01-18 | End: 2023-05-01

## 2023-01-18 RX ORDER — CYCLOBENZAPRINE HCL 10 MG
5-10 TABLET ORAL 2 TIMES DAILY PRN
Qty: 30 TABLET | Refills: 2 | Status: SHIPPED | OUTPATIENT
Start: 2023-01-18 | End: 2023-02-23 | Stop reason: ALTCHOICE

## 2023-01-18 NOTE — PROGRESS NOTES
"Established Patient Chronic Pain Note (Follow up visit)    Chief Complaint:   Chief Complaint   Patient presents with    Low-back Pain       SUBJECTIVE:    Simon Reynolds is a 37 y.o. female who presents to the clinic for a follow-up appointment for lower back pain. Since the last visit, Simon Reynolds states the pain has been improving. Current pain intensity is 6/10.  At the last visit, she was referred to physical therapy along with refills provided of Flexeril and increasing gabapentin to 400 t.i.d..  She was continued on Pamelor and Topamax.  She has completed her initial evaluation physical therapy recently, no set up for the next several weeks to undergo treatments 2 times weekly.    Patient denies night fever/night sweats, urinary incontinence, bowel incontinence, significant weight loss, significant motor weakness and loss of sensations.    Pain Disability Index Review:  Last 3 PDI Scores 8/10/2022   Pain Disability Index (PDI) 49       Interval History (11/10/2022):  Simon Reynolds presents today for follow-up visit.  Patient was last seen on 8/29/2022. At that visit, the plan was to start physical therapy. She will start going next month. She feels the pain is worsening and may be more open to procedures now. She feels more comfortable with our clinic than she has at her previous practice. Patient reports pain as 9/10 today.     Interval History (8/29/2022):  Simon Reynolds presents today for follow-up visit.  Patient was last seen about 3 weeks ago. She presents today on telemedicine visit to discuss MRI results (ordered by PCP). She c/o low back pain + BLE radiculopathy. She reports that she is uninterested in procedures because of "bad reactions in the past." She states that she refused physical therapy while she was on WC because "no one would tell her what was wrong with her". She is inquisitive about her condition today as well.        Initial HPI (8/10/22):  Simon Reynolds is a " "36 y.o. female who presents to the clinic for the evaluation of lower back and leg pain.   She was referred by her primary care provider for further evaluation and management of this pain.  She has a past medical history of migraines, epilepsy, and multiple other medical comorbidities as listed in her chart.  The pain started 2-3 years ago following a work related injury and symptoms have been unchanged.The pain is located in the lumbosacral area and radiates to the lower extremities.  The pain is described as  Sharp, stabbing, throbbing, aching, electric, numbness/tingling  and is rated as 7/10. The pain is rated with a score of  6/10 on the BEST day and a score of 10/10 on the WORST day.  Symptoms interfere with daily activity. The pain is exacerbated by bending, squatting.  The pain is mitigated by heat/ice and laying down.        Non-Pharmacologic Treatments:  Physical Therapy/Home Exercise: yes  Ice/Heat:yes  TENS: yes  Acupuncture: no  Massage: yes  Chiropractic: no    Other: no        Pain Medications:  - Opioids: Tramadol, Norco, Percocet, methadone  - Adjuvant Medications: NSAIDs, gabapentin, Cymbalta, Tylenol, baclofen, Zanaflex, Flexeril, lidocaine patch, Celebrex, amitriptyline, nortriptyline, Topamax, Voltaren gel  - Anti-Coagulants: None        Pain Procedures:   Externally: Previous cervical and lumbar interventions - per reporting: had several (>5) lum ESIs & reports having "bad reaction" to interventional pain procedures in the past (once being unable to walk after)        Imaging (Reviewed on 01/18/2023):      8/24/2022 MRI Lumbar Spine Without Contrast  FINDINGS:  The distal cord and conus appear intrinsically normal.     The cauda equina nerve roots appear normal     Partially included on this exam is a fluid signal intensity structure in the right lateral epidural space at the T11 and T12 vertebral body level.  The lesion displays fluid signal intensity characteristics and extends into the right " T12-L1 neural foramen.  There is mild chronic remodeling of the posterior aspect of the T12 vertebral body and right T12 pedicle.  Approximate size 12.3 x 4.8 mm transversely with craniocaudal length of 33.9 mm.  Probably represents a chronic benign fluid signal intensity collection such as an arachnoid cyst or perineural cyst/diverticulum.     T12-L1: Disc level unremarkable.     L1-2:     The disc level is unremarkable.     L2-3:     The disc level is unremarkable.     L3-4:     Minor facet arthrosis.     L4-5:     Minor disc desiccation with annular bulge.  Moderate left and mild right hypertrophic facet arthrosis.     L5-S1:    Unremarkable.  Impression:   Minor L4-5 disc bulge with moderate left and mild right facet arthrosis.  Mild foraminal stenosis.  Fluid signal intensity structure at the right T11/T12 level most consistent with benign fluid collection such as an arachnoid cyst or perineural diverticulum.  See above for detail.        CT abdomen pelvis 10/09/2021:  The visualized lung bases are clear. The heart is normal in size.     The right hepatic lobe measures 19.5 cm in span. The left hepatic lobe measures 7.9 cm in span. The spleen is normal in size. The liver and spleen are normal in density.     There is some linear increased density dependently in the gallbladder which is probably related to a small fold in the gallbladder rather than representing any gallstones. Otherwise, the gallbladder and biliary ductal system are normal.     The pancreas, adrenal glands, and right kidney are normal. One or two small cortical cysts are seen anteriorly in the left upper pole renal cortex. There is no obvious renal or ureteral calculus or hydronephrosis.     The anterior wall of the urinary bladder appears slightly thickened, which, at least in part, may be related to the partially decompressed state of the urinary bladder.     The uterus is midline and anteverted. The large right ovarian cyst seen on the  previous CT has resolved in the interim, with no abnormal ovarian mass currently seen. There is no free fluid within the pelvis and no ascites is noted. No adenopathy is seen.     The appendix is normal. No abnormal bowel distention or bowel wall thickening is noted. There are multiple small diverticula along the descending colon and at the junction of the descending colon and sigmoid colon. No diverticulitis.     The regional skeleton is intact. No fracture or dislocation is evident. No lytic or blastic bone lesion is seen.    PMHx,PSHx, Social history, and Family history:  I have reviewed the patient's medical, surgical, social, and family history in detail and updated the computerized patient record.    Review of patient's allergies indicates:   Allergen Reactions    Sulfa (sulfonamide antibiotics) Anaphylaxis, Hives, Itching and Other (See Comments)       Current Outpatient Medications   Medication Sig    acetaminophen 325 mg Cap Tylenol    biotin 1 mg Cap Take by mouth.    ciprofloxacin HCl (CIPRO) 500 MG tablet Take 1 tablet (500 mg total) by mouth every 12 (twelve) hours.    gabapentin (NEURONTIN) 400 MG capsule Take 1 capsule (400 mg total) by mouth 3 (three) times daily.    linaCLOtide (LINZESS) 72 mcg Cap capsule Take 1 capsule (72 mcg total) by mouth before breakfast.    melatonin 5 mg Cap Take 5 mg by mouth every evening.    methylPREDNISolone (MEDROL DOSEPACK) 4 mg tablet use as directed    multivit with minerals/lutein (MULTIVITAMIN 50 PLUS ORAL) multivitamin    naproxen (NAPROSYN) 500 MG tablet Take by mouth.    nortriptyline (PAMELOR) 25 MG capsule Take 1 capsule (25 mg total) by mouth every evening.    oxyCODONE-acetaminophen (PERCOCET) 5-325 mg per tablet Take 1-2 tablets by mouth.    topiramate (TOPAMAX) 100 MG tablet TAKE 1 TABLET(100 MG) BY MOUTH EVERY DAY    cyclobenzaprine (FLEXERIL) 10 MG tablet Take 0.5-1 tablets (5-10 mg total) by mouth 2 (two) times daily as needed for Muscle spasms.  "Likely will cause drowsiness.    ibuprofen (ADVIL,MOTRIN) 600 MG tablet Take 1 tablet (600 mg total) by mouth 3 (three) times daily as needed for Pain.    traMADoL (ULTRAM) 50 mg tablet Take by mouth.     No current facility-administered medications for this visit.         REVIEW OF SYSTEMS:    GENERAL:  No weight loss, malaise or fevers.  HEENT:   No recent changes in vision or hearing  NECK:  Negative for lumps, no difficulty with swallowing.  RESPIRATORY:  Negative for cough, wheezing or shortness of breath, patient denies any recent URI.  CARDIOVASCULAR:  Negative for chest pain, leg swelling or palpitations.  GI:  Negative for abdominal discomfort, blood in stools or black stools or change in bowel habits.  MUSCULOSKELETAL:  See HPI.  SKIN:  Negative for lesions, rash, and itching.  PSYCH:  No mood disorder or recent psychosocial stressors.  Patients sleep is not disturbed secondary to pain.  HEMATOLOGY/LYMPHOLOGY:  Negative for prolonged bleeding, bruising easily or swollen nodes.  Patient is not currently taking any anti-coagulants  NEURO:   No history of headaches, syncope, paralysis, seizures or tremors.  All other reviewed and negative other than HPI.    OBJECTIVE:    /73   Pulse 105   Ht 5' 4" (1.626 m)   Wt 72 kg (158 lb 11.7 oz)   LMP 12/02/2021 (Exact Date)   BMI 27.25 kg/m²     PHYSICAL EXAMINATION:  GENERAL: Well appearing, in no acute distress, alert and oriented x3.  PSYCH:  Mood and affect appropriate.  SKIN: Skin color, texture, turgor normal, no rashes or lesions.  HEAD/FACE:  Normocephalic, atraumatic. Cranial nerves grossly intact.  CV: RRR with palpation of the radial artery.  PULM: No evidence of respiratory difficulty, symmetric chest rise.  GI:  Soft and non-tender.  BACK: Straight leg raising in the sitting and supine positions is equivocal to radicular pain.  Mild-to-moderate pain to palpation over the facet joints of the lumbar spine and lumbar paraspinals.  Limited range of " motion secondary to pain reproduction.  EXTREMITIES: Peripheral joint ROM is full and pain free without obvious instability or laxity in all four extremities. No deformities, edema, or skin discoloration. Good capillary refill.  MUSCULOSKELETAL: Shoulder, hip, and knee provocative maneuvers are negative.  There is no pain with palpation over the sacroiliac joints bilaterally.  FABERs test is negative.  FADIRs test is negative.   Bilateral upper and lower extremity strength is normal and symmetric.  No atrophy or tone abnormalities are noted.  NEURO: Bilateral upper and lower extremity coordination and muscle stretch reflexes are physiologic and symmetric.  Plantar response are downgoing. No clonus.  No loss of sensation is noted.  GAIT: normal.      LABS:  Lab Results   Component Value Date    WBC 9.68 07/08/2020    HGB 13.8 07/08/2020    HCT 44.7 07/08/2020    MCV 87 07/08/2020     07/08/2020       CMP  Sodium   Date Value Ref Range Status   12/10/2021 139 136 - 145 mmol/L Final     Potassium   Date Value Ref Range Status   12/10/2021 3.8 3.5 - 5.1 mmol/L Final     Chloride   Date Value Ref Range Status   12/10/2021 108 95 - 110 mmol/L Final     CO2   Date Value Ref Range Status   12/10/2021 16 (L) 23 - 29 mmol/L Final     Glucose   Date Value Ref Range Status   12/10/2021 82 70 - 110 mg/dL Final     BUN   Date Value Ref Range Status   12/10/2021 12 6 - 20 mg/dL Final     Creatinine   Date Value Ref Range Status   12/10/2021 0.8 0.5 - 1.4 mg/dL Final     Calcium   Date Value Ref Range Status   12/10/2021 8.7 8.7 - 10.5 mg/dL Final     Total Protein   Date Value Ref Range Status   12/10/2021 6.8 6.0 - 8.4 g/dL Final     Albumin   Date Value Ref Range Status   12/10/2021 3.7 3.5 - 5.2 g/dL Final     Total Bilirubin   Date Value Ref Range Status   12/10/2021 0.2 0.1 - 1.0 mg/dL Final     Comment:     For infants and newborns, interpretation of results should be based  on gestational age, weight and in agreement  "with clinical  observations.    Premature Infant recommended reference ranges:  Up to 24 hours.............<8.0 mg/dL  Up to 48 hours............<12.0 mg/dL  3-5 days..................<15.0 mg/dL  6-29 days.................<15.0 mg/dL       Alkaline Phosphatase   Date Value Ref Range Status   12/10/2021 69 55 - 135 U/L Final     AST   Date Value Ref Range Status   12/10/2021 19 10 - 40 U/L Final     ALT   Date Value Ref Range Status   12/10/2021 25 10 - 44 U/L Final     Anion Gap   Date Value Ref Range Status   12/10/2021 15 8 - 16 mmol/L Final     eGFR if    Date Value Ref Range Status   12/10/2021 >60.0 >60 mL/min/1.73 m^2 Final     eGFR if non    Date Value Ref Range Status   12/10/2021 >60.0 >60 mL/min/1.73 m^2 Final     Comment:     Calculation used to obtain the estimated glomerular filtration  rate (eGFR) is the CKD-EPI equation.          Lab Results   Component Value Date    HGBA1C 4.9 12/10/2021             ASSESSMENT: 37 y.o. year old female with lower back pain, consistent with     1. Lumbar radiculopathy, chronic        2. Lumbar muscle pain  cyclobenzaprine (FLEXERIL) 10 MG tablet      3. Lumbar spondylosis              PLAN:   1. Interventional: We discussed trying lumbar MBB, but patient has been hesitant to move forward with procedures, although more open than she was previously. She would like to discuss further with Dr. Hernandez; will try physical therapy first. She reports having "bad reaction" to interventional pain procedures in the past (once being unable to walk after "block"). She feels that procedures in the past were just put in "random spots"; we have assured patient that we use x-ray guided techniques to ensure proper placement although I still cannot guarantee resolution of pain.      2. Pharmacologic:   -refill ibuprofen 600 mg t.i.d. p.r.n.  - Refill Flexeril 10mg QHS- BID PRN   - Continue gabapentin 400mg TID. Consider further Increase to gabapentin 600mg " TID if warranted.  - Continue Pamelor 25 mg QHS.  - Continue Topamax 100mg BID - from PCP - for epilepsy & migraines.  - Anticoagulation use: None.      3. Rehabilitative:  Continue with current physical therapy plan     4. Diagnostic:   - Lumbar MRI reviewed  - 2nd Request for imaging, office visit notes, and procedure notes from Miranda Jones MD ([place] [facility] , [place] [address]-Kbjvv: [phone]). Nothing received as of yet.     5. Follow up:  2-3 months after physical therapy completion     - This condition does not require this patient to take time off of work, and the primary goal of our Pain Management services is to improve the patient's functional capacity.     - I discussed the risks, benefits, and alternatives to potential treatment options. All questions and concerns were fully addressed today in clinic.     The above plan and management options were discussed at length with patient. Patient is in agreement with the above and verbalized understanding.      Michele Hernandez MD  Interventional Pain Management  [facility]    Disclaimer:  This note was prepared using voice recognition system and is likely to have sound alike errors that may have been overlooked even after proof reading.  Please call me with any questions

## 2023-01-24 ENCOUNTER — OFFICE VISIT (OUTPATIENT)
Dept: INTERNAL MEDICINE | Facility: CLINIC | Age: 38
End: 2023-01-24
Payer: MEDICAID

## 2023-01-24 ENCOUNTER — LAB VISIT (OUTPATIENT)
Dept: LAB | Facility: HOSPITAL | Age: 38
End: 2023-01-24
Attending: FAMILY MEDICINE
Payer: MEDICAID

## 2023-01-24 VITALS
BODY MASS INDEX: 27.55 KG/M2 | TEMPERATURE: 98 F | WEIGHT: 161.38 LBS | SYSTOLIC BLOOD PRESSURE: 110 MMHG | HEART RATE: 87 BPM | DIASTOLIC BLOOD PRESSURE: 70 MMHG | HEIGHT: 64 IN

## 2023-01-24 DIAGNOSIS — M25.50 ARTHRALGIA, UNSPECIFIED JOINT: ICD-10-CM

## 2023-01-24 DIAGNOSIS — Z00.00 WELLNESS EXAMINATION: ICD-10-CM

## 2023-01-24 DIAGNOSIS — Z01.00 ROUTINE EYE EXAM: ICD-10-CM

## 2023-01-24 DIAGNOSIS — K59.03 THERAPEUTIC OPIOID-INDUCED CONSTIPATION (OIC): ICD-10-CM

## 2023-01-24 DIAGNOSIS — Z86.19 HISTORY OF HPV INFECTION: ICD-10-CM

## 2023-01-24 DIAGNOSIS — G43.909 MIGRAINE WITHOUT STATUS MIGRAINOSUS, NOT INTRACTABLE, UNSPECIFIED MIGRAINE TYPE: ICD-10-CM

## 2023-01-24 DIAGNOSIS — Z00.00 WELLNESS EXAMINATION: Primary | ICD-10-CM

## 2023-01-24 DIAGNOSIS — T40.2X5A THERAPEUTIC OPIOID-INDUCED CONSTIPATION (OIC): ICD-10-CM

## 2023-01-24 PROBLEM — M54.9 BACK PAIN: Status: RESOLVED | Noted: 2020-07-07 | Resolved: 2023-01-24

## 2023-01-24 LAB
ALBUMIN SERPL BCP-MCNC: 4.1 G/DL (ref 3.5–5.2)
ALP SERPL-CCNC: 97 U/L (ref 55–135)
ALT SERPL W/O P-5'-P-CCNC: 34 U/L (ref 10–44)
ANION GAP SERPL CALC-SCNC: 7 MMOL/L (ref 8–16)
AST SERPL-CCNC: 11 U/L (ref 10–40)
BASOPHILS # BLD AUTO: 0.06 K/UL (ref 0–0.2)
BASOPHILS NFR BLD: 0.7 % (ref 0–1.9)
BILIRUB SERPL-MCNC: 0.3 MG/DL (ref 0.1–1)
BUN SERPL-MCNC: 10 MG/DL (ref 6–20)
CALCIUM SERPL-MCNC: 9.8 MG/DL (ref 8.7–10.5)
CCP AB SER IA-ACNC: 0.7 U/ML
CHLORIDE SERPL-SCNC: 108 MMOL/L (ref 95–110)
CHOLEST SERPL-MCNC: 251 MG/DL (ref 120–199)
CHOLEST/HDLC SERPL: 5.5 {RATIO} (ref 2–5)
CO2 SERPL-SCNC: 25 MMOL/L (ref 23–29)
CREAT SERPL-MCNC: 1 MG/DL (ref 0.5–1.4)
CRP SERPL-MCNC: 7.4 MG/L (ref 0–8.2)
DIFFERENTIAL METHOD: ABNORMAL
EOSINOPHIL # BLD AUTO: 0.1 K/UL (ref 0–0.5)
EOSINOPHIL NFR BLD: 0.9 % (ref 0–8)
ERYTHROCYTE [DISTWIDTH] IN BLOOD BY AUTOMATED COUNT: 12.8 % (ref 11.5–14.5)
ERYTHROCYTE [SEDIMENTATION RATE] IN BLOOD BY WESTERGREN METHOD: 15 MM/HR (ref 0–20)
EST. GFR  (NO RACE VARIABLE): >60 ML/MIN/1.73 M^2
ESTIMATED AVG GLUCOSE: 94 MG/DL (ref 68–131)
FERRITIN SERPL-MCNC: 90 NG/ML (ref 20–300)
GLUCOSE SERPL-MCNC: 85 MG/DL (ref 70–110)
HBA1C MFR BLD: 4.9 % (ref 4–5.6)
HCT VFR BLD AUTO: 45.2 % (ref 37–48.5)
HDLC SERPL-MCNC: 46 MG/DL (ref 40–75)
HDLC SERPL: 18.3 % (ref 20–50)
HGB BLD-MCNC: 14.1 G/DL (ref 12–16)
IMM GRANULOCYTES # BLD AUTO: 0.03 K/UL (ref 0–0.04)
IMM GRANULOCYTES NFR BLD AUTO: 0.3 % (ref 0–0.5)
IRON SERPL-MCNC: 78 UG/DL (ref 30–160)
LDLC SERPL CALC-MCNC: 166.6 MG/DL (ref 63–159)
LYMPHOCYTES # BLD AUTO: 1.8 K/UL (ref 1–4.8)
LYMPHOCYTES NFR BLD: 19.8 % (ref 18–48)
MCH RBC QN AUTO: 26.5 PG (ref 27–31)
MCHC RBC AUTO-ENTMCNC: 31.2 G/DL (ref 32–36)
MCV RBC AUTO: 85 FL (ref 82–98)
MONOCYTES # BLD AUTO: 0.5 K/UL (ref 0.3–1)
MONOCYTES NFR BLD: 5.5 % (ref 4–15)
NEUTROPHILS # BLD AUTO: 6.5 K/UL (ref 1.8–7.7)
NEUTROPHILS NFR BLD: 72.8 % (ref 38–73)
NONHDLC SERPL-MCNC: 205 MG/DL
NRBC BLD-RTO: 0 /100 WBC
PLATELET # BLD AUTO: 396 K/UL (ref 150–450)
PMV BLD AUTO: 10.2 FL (ref 9.2–12.9)
POTASSIUM SERPL-SCNC: 4.1 MMOL/L (ref 3.5–5.1)
PROT SERPL-MCNC: 7.6 G/DL (ref 6–8.4)
RBC # BLD AUTO: 5.33 M/UL (ref 4–5.4)
RHEUMATOID FACT SERPL-ACNC: <13 IU/ML (ref 0–15)
SATURATED IRON: 23 % (ref 20–50)
SODIUM SERPL-SCNC: 140 MMOL/L (ref 136–145)
TOTAL IRON BINDING CAPACITY: 340 UG/DL (ref 250–450)
TRANSFERRIN SERPL-MCNC: 230 MG/DL (ref 200–375)
TRIGL SERPL-MCNC: 192 MG/DL (ref 30–150)
TSH SERPL DL<=0.005 MIU/L-ACNC: 1.44 UIU/ML (ref 0.4–4)
VIT B12 SERPL-MCNC: 1267 PG/ML (ref 210–950)
WBC # BLD AUTO: 8.94 K/UL (ref 3.9–12.7)

## 2023-01-24 PROCEDURE — 84443 ASSAY THYROID STIM HORMONE: CPT | Performed by: FAMILY MEDICINE

## 2023-01-24 PROCEDURE — 3078F PR MOST RECENT DIASTOLIC BLOOD PRESSURE < 80 MM HG: ICD-10-PCS | Mod: CPTII,,, | Performed by: FAMILY MEDICINE

## 2023-01-24 PROCEDURE — 86200 CCP ANTIBODY: CPT | Performed by: FAMILY MEDICINE

## 2023-01-24 PROCEDURE — 80061 LIPID PANEL: CPT | Performed by: FAMILY MEDICINE

## 2023-01-24 PROCEDURE — 1160F PR REVIEW ALL MEDS BY PRESCRIBER/CLIN PHARMACIST DOCUMENTED: ICD-10-PCS | Mod: CPTII,,, | Performed by: FAMILY MEDICINE

## 2023-01-24 PROCEDURE — 86235 NUCLEAR ANTIGEN ANTIBODY: CPT | Performed by: FAMILY MEDICINE

## 2023-01-24 PROCEDURE — 80053 COMPREHEN METABOLIC PANEL: CPT | Performed by: FAMILY MEDICINE

## 2023-01-24 PROCEDURE — 82728 ASSAY OF FERRITIN: CPT | Performed by: FAMILY MEDICINE

## 2023-01-24 PROCEDURE — 99395 PR PREVENTIVE VISIT,EST,18-39: ICD-10-PCS | Mod: S$PBB,,, | Performed by: FAMILY MEDICINE

## 2023-01-24 PROCEDURE — 86038 ANTINUCLEAR ANTIBODIES: CPT | Performed by: FAMILY MEDICINE

## 2023-01-24 PROCEDURE — 1159F MED LIST DOCD IN RCRD: CPT | Mod: CPTII,,, | Performed by: FAMILY MEDICINE

## 2023-01-24 PROCEDURE — 83036 HEMOGLOBIN GLYCOSYLATED A1C: CPT | Performed by: FAMILY MEDICINE

## 2023-01-24 PROCEDURE — 36415 COLL VENOUS BLD VENIPUNCTURE: CPT | Mod: PO | Performed by: FAMILY MEDICINE

## 2023-01-24 PROCEDURE — 82607 VITAMIN B-12: CPT | Performed by: FAMILY MEDICINE

## 2023-01-24 PROCEDURE — 1160F RVW MEDS BY RX/DR IN RCRD: CPT | Mod: CPTII,,, | Performed by: FAMILY MEDICINE

## 2023-01-24 PROCEDURE — 3008F PR BODY MASS INDEX (BMI) DOCUMENTED: ICD-10-PCS | Mod: CPTII,,, | Performed by: FAMILY MEDICINE

## 2023-01-24 PROCEDURE — 84466 ASSAY OF TRANSFERRIN: CPT | Performed by: FAMILY MEDICINE

## 2023-01-24 PROCEDURE — 1159F PR MEDICATION LIST DOCUMENTED IN MEDICAL RECORD: ICD-10-PCS | Mod: CPTII,,, | Performed by: FAMILY MEDICINE

## 2023-01-24 PROCEDURE — 3074F SYST BP LT 130 MM HG: CPT | Mod: CPTII,,, | Performed by: FAMILY MEDICINE

## 2023-01-24 PROCEDURE — 86431 RHEUMATOID FACTOR QUANT: CPT | Performed by: FAMILY MEDICINE

## 2023-01-24 PROCEDURE — 99214 OFFICE O/P EST MOD 30 MIN: CPT | Mod: PBBFAC,PO | Performed by: FAMILY MEDICINE

## 2023-01-24 PROCEDURE — 99999 PR PBB SHADOW E&M-EST. PATIENT-LVL IV: ICD-10-PCS | Mod: PBBFAC,,, | Performed by: FAMILY MEDICINE

## 2023-01-24 PROCEDURE — 85651 RBC SED RATE NONAUTOMATED: CPT | Performed by: FAMILY MEDICINE

## 2023-01-24 PROCEDURE — 86140 C-REACTIVE PROTEIN: CPT | Performed by: FAMILY MEDICINE

## 2023-01-24 PROCEDURE — 3008F BODY MASS INDEX DOCD: CPT | Mod: CPTII,,, | Performed by: FAMILY MEDICINE

## 2023-01-24 PROCEDURE — 3078F DIAST BP <80 MM HG: CPT | Mod: CPTII,,, | Performed by: FAMILY MEDICINE

## 2023-01-24 PROCEDURE — 85025 COMPLETE CBC W/AUTO DIFF WBC: CPT | Performed by: FAMILY MEDICINE

## 2023-01-24 PROCEDURE — 99999 PR PBB SHADOW E&M-EST. PATIENT-LVL IV: CPT | Mod: PBBFAC,,, | Performed by: FAMILY MEDICINE

## 2023-01-24 PROCEDURE — 3074F PR MOST RECENT SYSTOLIC BLOOD PRESSURE < 130 MM HG: ICD-10-PCS | Mod: CPTII,,, | Performed by: FAMILY MEDICINE

## 2023-01-24 PROCEDURE — 99395 PREV VISIT EST AGE 18-39: CPT | Mod: S$PBB,,, | Performed by: FAMILY MEDICINE

## 2023-01-24 RX ORDER — TOPIRAMATE 100 MG/1
100 TABLET, FILM COATED ORAL DAILY
Qty: 90 TABLET | Refills: 1 | Status: SHIPPED | OUTPATIENT
Start: 2023-01-24 | End: 2023-04-13 | Stop reason: SDUPTHER

## 2023-01-24 NOTE — PROGRESS NOTES
Subjective:       Patient ID: Simon Reynolds is a 37 y.o. female.    Chief Complaint: No chief complaint on file.    Simon Reynolds is a 37 y.o. female and is here for a comprehensive physical exam.    Do you take any herbs or supplements that were not prescribed by a doctor? Yes, multivit  Are you taking calcium supplements? no  Are you taking aspirin daily? no     History:  Any STD's in the past? none    The following portions of the patient's history were reviewed and updated as appropriate: allergies, current medications, past family history, past medical history, past social history, past surgical history and problem list.    Review of Systems  Do you have pain that bothers you in your daily life? chronic back pain  Pertinent items are noted in HPI.      2. Patient Counseling:  --Nutrition: Stressed importance of moderation in sodium/caffeine intake, saturated fat and cholesterol, caloric balance.  --Exercise: Stressed the importance of regular exercise.   --Substance Abuse: Discussed cessation/primary prevention of tobacco, alcohol - nonuser   --Sexuality: Discussed sexually transmitted disease.  --Injury prevention: Discussed safety belts, smoke detector.   --Dental health: Discussed dental health.  --Immunizations reviewed.    3. Discussed the patient's BMI.  4. Follow up as needed for acute illness        Review of Systems   Constitutional:  Negative for fever.   HENT:  Positive for congestion.    Eyes:  Negative for discharge.   Respiratory:  Negative for shortness of breath.    Cardiovascular:  Negative for chest pain.   Gastrointestinal:  Negative for abdominal pain.   Genitourinary:  Negative for difficulty urinating.   Musculoskeletal:  Positive for arthralgias. Negative for joint swelling.   Neurological:  Positive for headaches. Negative for dizziness.   Psychiatric/Behavioral:  Negative for agitation.      Objective:      Physical Exam  Vitals and nursing note reviewed.   Constitutional:        General: She is not in acute distress.     Appearance: Normal appearance. She is well-developed. She is not diaphoretic.   HENT:      Head: Normocephalic and atraumatic.   Eyes:      General: No scleral icterus.     Conjunctiva/sclera: Conjunctivae normal.   Cardiovascular:      Rate and Rhythm: Normal rate and regular rhythm.   Pulmonary:      Effort: Pulmonary effort is normal. No respiratory distress.      Breath sounds: Normal breath sounds. No wheezing.   Abdominal:      General: There is no distension.      Palpations: Abdomen is soft.      Tenderness: There is no abdominal tenderness. There is no guarding.   Skin:     General: Skin is warm.      Coloration: Skin is not pale.      Findings: No erythema or rash.      Comments: Good turgor   Neurological:      Mental Status: She is alert.   Psychiatric:         Mood and Affect: Mood normal.         Thought Content: Thought content normal.       Assessment:       1. Wellness examination    2. Routine eye exam    3. Migraine without status migrainosus, not intractable, unspecified migraine type    4. Therapeutic opioid-induced constipation (OIC)    5. History of HPV infection    6. Arthralgia, unspecified joint          Plan:     Problem List Items Addressed This Visit          Neuro    Migraine without status migrainosus, not intractable    Overview     Chronic, Stable, cont topamax         Relevant Medications    topiramate (TOPAMAX) 100 MG tablet    Other Relevant Orders    Ambulatory referral/consult to Neurology       Ophtho    Routine eye exam    Relevant Orders    Ambulatory referral/consult to Optometry       Renal/    History of HPV infection       GI    Therapeutic opioid-induced constipation (OIC)    Relevant Medications    linaCLOtide (LINZESS) 72 mcg Cap capsule       Orthopedic    Arthralgia    Relevant Orders    Sedimentation rate    C-reactive protein    LOUIE    Rheumatoid factor    Cyclic citrul peptide antibody, IgG    Sjogrens syndrome-A  extractable nuclear antibody    Urinalysis    Protein / creatinine ratio, urine       Other    Wellness examination - Primary    Relevant Orders    CBC Auto Differential    Comprehensive Metabolic Panel    Hemoglobin A1C    Ferritin    Iron and TIBC    Lipid Panel    TSH    VITAMIN B12

## 2023-01-25 LAB
ANA SER QL IF: NORMAL
ANTI-SSA ANTIBODY: 0.05 RATIO (ref 0–0.99)
ANTI-SSA INTERPRETATION: NEGATIVE

## 2023-02-01 DIAGNOSIS — M54.16 LUMBAR RADICULOPATHY, CHRONIC: ICD-10-CM

## 2023-02-01 DIAGNOSIS — G89.29 ACUTE EXACERBATION OF CHRONIC LOW BACK PAIN: ICD-10-CM

## 2023-02-01 DIAGNOSIS — M54.50 ACUTE EXACERBATION OF CHRONIC LOW BACK PAIN: ICD-10-CM

## 2023-02-01 RX ORDER — GABAPENTIN 400 MG/1
400 CAPSULE ORAL 3 TIMES DAILY
Qty: 90 CAPSULE | Refills: 2 | Status: SHIPPED | OUTPATIENT
Start: 2023-02-01 | End: 2023-06-29 | Stop reason: ALTCHOICE

## 2023-02-01 RX ORDER — METHYLPREDNISOLONE 4 MG/1
TABLET ORAL
Qty: 21 EACH | Refills: 0 | Status: CANCELLED | OUTPATIENT
Start: 2023-02-01

## 2023-02-01 RX ORDER — GABAPENTIN 400 MG/1
400 CAPSULE ORAL 3 TIMES DAILY
Qty: 90 CAPSULE | Refills: 2 | Status: CANCELLED | OUTPATIENT
Start: 2023-02-01

## 2023-02-01 RX ORDER — METHYLPREDNISOLONE 4 MG/1
TABLET ORAL
Qty: 21 EACH | Refills: 0 | OUTPATIENT
Start: 2023-02-01

## 2023-02-01 NOTE — TELEPHONE ENCOUNTER
----- Message from Micki Wilkins sent at 2/1/2023  2:34 PM CST -----  Contact: Simon  Type:  Patient Returning Call    Who Called: Simon  Who Left Message for Patient: Stephen  Does the patient know what this is regarding?: She is needing the Gabapentin and Medrol sent in  Would the patient rather a call back or a response via [facility]? Call  Best Call Back Number: [phone]  Additional Information: Patient is out of the Gabapentin.     Please send them to:   [id number] - [place], [place] - [address] AT [facility] [address]  [place] [id number]  Phone: [phone] Fax: [phone]

## 2023-02-01 NOTE — TELEPHONE ENCOUNTER
Attempt to reach patient to see if the patient need a refill of Gabapentin and a Medrol does pack. The patient did not answer. Left voice message on patients voice box to call back at earliest convenience.     Stephen Phillip  Medical Assistant

## 2023-02-02 DIAGNOSIS — E78.5 HYPERLIPIDEMIA, UNSPECIFIED HYPERLIPIDEMIA TYPE: Primary | ICD-10-CM

## 2023-02-02 RX ORDER — ATORVASTATIN CALCIUM 40 MG/1
40 TABLET, FILM COATED ORAL DAILY
Qty: 90 TABLET | Refills: 0 | Status: SHIPPED | OUTPATIENT
Start: 2023-02-02 | End: 2023-05-02

## 2023-02-22 ENCOUNTER — TELEPHONE (OUTPATIENT)
Dept: PAIN MEDICINE | Facility: CLINIC | Age: 38
End: 2023-02-22
Payer: MEDICAID

## 2023-02-23 ENCOUNTER — OFFICE VISIT (OUTPATIENT)
Dept: PAIN MEDICINE | Facility: CLINIC | Age: 38
End: 2023-02-23
Payer: MEDICAID

## 2023-02-23 VITALS — RESPIRATION RATE: 17 BRPM

## 2023-02-23 DIAGNOSIS — M54.16 LUMBAR RADICULOPATHY, CHRONIC: ICD-10-CM

## 2023-02-23 DIAGNOSIS — M79.18 LUMBAR MUSCLE PAIN: Primary | ICD-10-CM

## 2023-02-23 DIAGNOSIS — M47.816 LUMBAR SPONDYLOSIS: ICD-10-CM

## 2023-02-23 PROCEDURE — 1160F PR REVIEW ALL MEDS BY PRESCRIBER/CLIN PHARMACIST DOCUMENTED: ICD-10-PCS | Mod: CPTII,95,, | Performed by: PHYSICIAN ASSISTANT

## 2023-02-23 PROCEDURE — 99214 PR OFFICE/OUTPT VISIT, EST, LEVL IV, 30-39 MIN: ICD-10-PCS | Mod: 95,,, | Performed by: PHYSICIAN ASSISTANT

## 2023-02-23 PROCEDURE — 3044F HG A1C LEVEL LT 7.0%: CPT | Mod: CPTII,95,, | Performed by: PHYSICIAN ASSISTANT

## 2023-02-23 PROCEDURE — 3044F PR MOST RECENT HEMOGLOBIN A1C LEVEL <7.0%: ICD-10-PCS | Mod: CPTII,95,, | Performed by: PHYSICIAN ASSISTANT

## 2023-02-23 PROCEDURE — 1160F RVW MEDS BY RX/DR IN RCRD: CPT | Mod: CPTII,95,, | Performed by: PHYSICIAN ASSISTANT

## 2023-02-23 PROCEDURE — 99214 OFFICE O/P EST MOD 30 MIN: CPT | Mod: 95,,, | Performed by: PHYSICIAN ASSISTANT

## 2023-02-23 PROCEDURE — 1159F PR MEDICATION LIST DOCUMENTED IN MEDICAL RECORD: ICD-10-PCS | Mod: CPTII,95,, | Performed by: PHYSICIAN ASSISTANT

## 2023-02-23 PROCEDURE — 1159F MED LIST DOCD IN RCRD: CPT | Mod: CPTII,95,, | Performed by: PHYSICIAN ASSISTANT

## 2023-02-23 RX ORDER — TIZANIDINE 4 MG/1
2-4 TABLET ORAL 3 TIMES DAILY PRN
Qty: 90 TABLET | Refills: 2 | Status: SHIPPED | OUTPATIENT
Start: 2023-02-23 | End: 2023-05-30

## 2023-02-23 NOTE — PROGRESS NOTES
The patient location is: [place]  The chief complaint leading to consultation is: chronic pain     Visit type: audiovisual    Face to Face time with patient: 10-15 minutes  20 minutes of total time spent on the encounter, which includes face to face time and non-face to face time preparing to see the patient (eg, review of tests), Obtaining and/or reviewing separately obtained history, Documenting clinical information in the electronic or other health record, Independently interpreting results (not separately reported) and communicating results to the patient/family/caregiver, or Care coordination (not separately reported).     Each patient to whom he or she provides medical services by telemedicine is:  (1) informed of the relationship between the physician and patient and the respective role of any other health care provider with respect to management of the patient; and (2) notified that he or she may decline to receive medical services by telemedicine and may withdraw from such care at any time.      Established Patient Chronic Pain Note (Follow up visit)    Chief Complaint:   Chief Complaint   Patient presents with    Pain       SUBJECTIVE:    Interval History (2/23/2023):  Simon Reynolds presents today for follow-up visit.  Patient was last seen about 6 weeks ago . At that visit, the plan was to start physical therapy.  She was not able to attend regularly due to work schedule.  She has been doing at home exercises and stretching, which she feels is helping her pain overall.  She still feels that she is stiff and sore, but her pain is decreased.  She is requesting a different muscle relaxer today as Flexeril is not helping.  She tried Robaxin in the past with limited pain relief as well. Patient reports pain as 6/10 today.    Interval HPI (01/18/2023):  Simon Reynolds is a 37 y.o. female who presents to the clinic for a follow-up appointment for lower back pain. Since the last visit, Simon Reynolds  "states the pain has been improving. Current pain intensity is 6/10.  At the last visit, she was referred to physical therapy along with refills provided of Flexeril and increasing gabapentin to 400 t.i.d..  She was continued on Pamelor and Topamax.  She has completed her initial evaluation physical therapy recently, no set up for the next several weeks to undergo treatments 2 times weekly.  Patient denies night fever/night sweats, urinary incontinence, bowel incontinence, significant weight loss, significant motor weakness and loss of sensations.    Pain Disability Index Review:  Last 3 PDI Scores 8/10/2022   Pain Disability Index (PDI) 49     Interval History (11/10/2022):  Simon Reynolds presents today for follow-up visit.  Patient was last seen on 8/29/2022. At that visit, the plan was to start physical therapy. She will start going next month. She feels the pain is worsening and may be more open to procedures now. She feels more comfortable with our clinic than she has at her previous practice. Patient reports pain as 9/10 today.     Interval History (8/29/2022):  Simon Reynolds presents today for follow-up visit.  Patient was last seen about 3 weeks ago. She presents today on telemedicine visit to discuss MRI results (ordered by PCP). She c/o low back pain + BLE radiculopathy. She reports that she is uninterested in procedures because of "bad reactions in the past." She states that she refused physical therapy while she was on WC because "no one would tell her what was wrong with her". She is inquisitive about her condition today as well.        Initial HPI (8/10/22):  Simon Reynolds is a 36 y.o. female who presents to the clinic for the evaluation of lower back and leg pain.   She was referred by her primary care provider for further evaluation and management of this pain.  She has a past medical history of migraines, epilepsy, and multiple other medical comorbidities as listed in her chart.  The " "pain started 2-3 years ago following a work related injury and symptoms have been unchanged.The pain is located in the lumbosacral area and radiates to the lower extremities.  The pain is described as  Sharp, stabbing, throbbing, aching, electric, numbness/tingling  and is rated as 7/10. The pain is rated with a score of  6/10 on the BEST day and a score of 10/10 on the WORST day.  Symptoms interfere with daily activity. The pain is exacerbated by bending, squatting.  The pain is mitigated by heat/ice and laying down.        Non-Pharmacologic Treatments:  Physical Therapy/Home Exercise: yes  Ice/Heat:yes  TENS: yes  Acupuncture: no  Massage: yes  Chiropractic: no    Other: no        Pain Medications:  - Opioids: Tramadol, Norco, Percocet, methadone  - Adjuvant Medications: NSAIDs, gabapentin, Cymbalta, Tylenol, baclofen, Zanaflex, Flexeril, lidocaine patch, Celebrex, amitriptyline, nortriptyline, Topamax, Voltaren gel  - Anti-Coagulants: None        Pain Procedures:   Externally: Previous cervical and lumbar interventions - per reporting: had several (>5) lum ESIs & reports having "bad reaction" to interventional pain procedures in the past (once being unable to walk after)        Imaging (Reviewed on 02/23/2023):      8/24/2022 MRI Lumbar Spine Without Contrast  FINDINGS:  The distal cord and conus appear intrinsically normal.     The cauda equina nerve roots appear normal     Partially included on this exam is a fluid signal intensity structure in the right lateral epidural space at the T11 and T12 vertebral body level.  The lesion displays fluid signal intensity characteristics and extends into the right T12-L1 neural foramen.  There is mild chronic remodeling of the posterior aspect of the T12 vertebral body and right T12 pedicle.  Approximate size 12.3 x 4.8 mm transversely with craniocaudal length of 33.9 mm.  Probably represents a chronic benign fluid signal intensity collection such as an arachnoid cyst or " perineural cyst/diverticulum.     T12-L1: Disc level unremarkable.     L1-2:     The disc level is unremarkable.     L2-3:     The disc level is unremarkable.     L3-4:     Minor facet arthrosis.     L4-5:     Minor disc desiccation with annular bulge.  Moderate left and mild right hypertrophic facet arthrosis.     L5-S1:    Unremarkable.  Impression:   Minor L4-5 disc bulge with moderate left and mild right facet arthrosis.  Mild foraminal stenosis.  Fluid signal intensity structure at the right T11/T12 level most consistent with benign fluid collection such as an arachnoid cyst or perineural diverticulum.  See above for detail.        CT abdomen pelvis 10/09/2021:  The visualized lung bases are clear. The heart is normal in size.     The right hepatic lobe measures 19.5 cm in span. The left hepatic lobe measures 7.9 cm in span. The spleen is normal in size. The liver and spleen are normal in density.     There is some linear increased density dependently in the gallbladder which is probably related to a small fold in the gallbladder rather than representing any gallstones. Otherwise, the gallbladder and biliary ductal system are normal.     The pancreas, adrenal glands, and right kidney are normal. One or two small cortical cysts are seen anteriorly in the left upper pole renal cortex. There is no obvious renal or ureteral calculus or hydronephrosis.     The anterior wall of the urinary bladder appears slightly thickened, which, at least in part, may be related to the partially decompressed state of the urinary bladder.     The uterus is midline and anteverted. The large right ovarian cyst seen on the previous CT has resolved in the interim, with no abnormal ovarian mass currently seen. There is no free fluid within the pelvis and no ascites is noted. No adenopathy is seen.     The appendix is normal. No abnormal bowel distention or bowel wall thickening is noted. There are multiple small diverticula along the  descending colon and at the junction of the descending colon and sigmoid colon. No diverticulitis.     The regional skeleton is intact. No fracture or dislocation is evident. No lytic or blastic bone lesion is seen.        REVIEW OF SYSTEMS:    GENERAL:  No weight loss, malaise or fevers.  HEENT:   No recent changes in vision or hearing  NECK:  Negative for lumps, no difficulty with swallowing.  RESPIRATORY:  Negative for cough, wheezing or shortness of breath, patient denies any recent URI.  CARDIOVASCULAR:  Negative for chest pain, leg swelling or palpitations.  GI:  Negative for abdominal discomfort, blood in stools or black stools or change in bowel habits.  MUSCULOSKELETAL:  See HPI.  SKIN:  Negative for lesions, rash, and itching.  PSYCH:  No mood disorder or recent psychosocial stressors.  Patients sleep is not disturbed secondary to pain.  HEMATOLOGY/LYMPHOLOGY:  Negative for prolonged bleeding, bruising easily or swollen nodes.  Patient is not currently taking any anti-coagulants  NEURO:   No history of headaches, syncope, paralysis, seizures or tremors.  All other reviewed and negative other than HPI.      OBJECTIVE:    PHYSICAL EXAMINATION:  Telemedicine Exam  Vitals:    02/23/23 0943   Resp: 17     There is no height or weight on file to calculate BMI.   (reviewed on 2/23/2023)     GENERAL: Well appearing, in no acute distress, alert and oriented x3.  Cooperative.  PSYCH:  Mood and affect appropriate.  SKIN: Skin color & texture with no obvious abnormalities.    HEAD/FACE:  Normocephalic, atraumatic.    PULM:  No difficulty breathing. No nasal flaring. No obvious wheezing.  EXTREMITIES: No obvious deformities. Moving all extremities well, appears to have symmetric strength throughout.  MUSCULOSKELETAL: No obvious atrophy abnormalities are noted.   NEURO: No obvious neurologic deficit.   GAIT: sitting.     Physical Exam: last in clinic visit:  GENERAL: Well appearing, in no acute distress, alert and  "oriented x3.  PSYCH:  Mood and affect appropriate.  SKIN: Skin color, texture, turgor normal, no rashes or lesions.  HEAD/FACE:  Normocephalic, atraumatic. Cranial nerves grossly intact.  CV: RRR with palpation of the radial artery.  PULM: No evidence of respiratory difficulty, symmetric chest rise.  GI:  Soft and non-tender.  BACK: Straight leg raising in the sitting and supine positions is equivocal to radicular pain.  Mild-to-moderate pain to palpation over the facet joints of the lumbar spine and lumbar paraspinals.  Limited range of motion secondary to pain reproduction.  EXTREMITIES: Peripheral joint ROM is full and pain free without obvious instability or laxity in all four extremities. No deformities, edema, or skin discoloration. Good capillary refill.  MUSCULOSKELETAL: Shoulder, hip, and knee provocative maneuvers are negative.  There is no pain with palpation over the sacroiliac joints bilaterally.  FABERs test is negative.  FADIRs test is negative.   Bilateral upper and lower extremity strength is normal and symmetric.  No atrophy or tone abnormalities are noted.  NEURO: Bilateral upper and lower extremity coordination and muscle stretch reflexes are physiologic and symmetric.  Plantar response are downgoing. No clonus.  No loss of sensation is noted.  GAIT: normal.        ASSESSMENT: 37 y.o. year old female with lower back pain, consistent with     1. Lumbar muscle pain  Ambulatory referral/consult to Physical/Occupational Therapy    tiZANidine (ZANAFLEX) 4 MG tablet      2. Lumbar radiculopathy, chronic        3. Lumbar spondylosis                PLAN:   1. Interventional: We previously discussed trying lumbar MBB, but patient has been hesitant to move forward with procedures, although more open than she was previously. She would like to discuss further with Dr. Hernandez; will try physical therapy first. She reports having "bad reaction" to interventional pain procedures in the past (once being unable to " "walk after "block"). She feels that procedures in the past were just put in "random spots"; we have assured patient that we use x-ray guided techniques to ensure proper placement although I still cannot guarantee resolution of pain.      2. Pharmacologic:   - Refill ibuprofen 600 mg TID PRN and gabapentin 400mg TID. Consider further Increase to gabapentin 600mg TID if warranted in future.  - Start Zanaflex 4mg TID PRN muscle spasms (or can take 1/2 tablet). Patient understands this may cause drowsiness. D/c Flexeril 10mg QHS- BID PRN - ineffective; also failed Robaxin in the past.  - D/c Pamelor 25 mg QHS - no longer taking.   - Continue Topamax 100mg BID - from PCP - for epilepsy & migraines.  - Anticoagulation use: None.      3. Rehabilitative: Start PT with passive modalities, including ice and heat, and active modalities, including a regimen for stretching and strengthening.  Order sent internally.  Continue at-home exercises and stretching, which is helping.     4. Diagnostic:   - Lumbar MRI reviewed  - Previously sent Request for imaging, office visit notes, and procedure notes from Miranda Jones MD ([facility]-[address] [facility] , [place] [address]-V[place]sp[place]: [phone]). Nothing received.     5. Follow up: PRN      - This condition does not require this patient to take time off of work, and the primary goal of our Pain Management services is to improve the patient's functional capacity.   - I discussed the risks, benefits, and alternatives to potential treatment options. All questions and concerns were fully addressed today in clinic.         Lashawn Morgan PA-C  Interventional Pain Management - [facility]    Disclaimer:  This note was prepared using voice recognition system and is likely to have sound alike errors that may have been overlooked even after proof reading.  Please call me with any questions.   "

## 2023-04-29 DIAGNOSIS — G43.909 MIGRAINE WITHOUT STATUS MIGRAINOSUS, NOT INTRACTABLE, UNSPECIFIED MIGRAINE TYPE: ICD-10-CM

## 2023-04-29 DIAGNOSIS — E78.5 HYPERLIPIDEMIA, UNSPECIFIED HYPERLIPIDEMIA TYPE: ICD-10-CM

## 2023-05-01 PROBLEM — Z00.00 WELLNESS EXAMINATION: Status: RESOLVED | Noted: 2022-09-08 | Resolved: 2023-05-01

## 2023-05-02 RX ORDER — TOPIRAMATE 100 MG/1
TABLET, FILM COATED ORAL
Qty: 30 TABLET | Refills: 1 | Status: SHIPPED | OUTPATIENT
Start: 2023-05-02 | End: 2023-06-29

## 2023-05-02 RX ORDER — ATORVASTATIN CALCIUM 40 MG/1
TABLET, FILM COATED ORAL
Qty: 30 TABLET | Refills: 1 | Status: SHIPPED | OUTPATIENT
Start: 2023-05-02 | End: 2023-06-28

## 2023-05-04 ENCOUNTER — PATIENT MESSAGE (OUTPATIENT)
Dept: PAIN MEDICINE | Facility: CLINIC | Age: 38
End: 2023-05-04
Payer: MEDICAID

## 2023-05-04 DIAGNOSIS — M54.50 ACUTE EXACERBATION OF CHRONIC LOW BACK PAIN: ICD-10-CM

## 2023-05-04 DIAGNOSIS — G89.29 ACUTE EXACERBATION OF CHRONIC LOW BACK PAIN: ICD-10-CM

## 2023-05-05 RX ORDER — METHYLPREDNISOLONE 4 MG/1
TABLET ORAL
Qty: 21 EACH | Refills: 0 | Status: SHIPPED | OUTPATIENT
Start: 2023-05-05 | End: 2023-06-29 | Stop reason: SDUPTHER

## 2023-05-25 ENCOUNTER — HOSPITAL ENCOUNTER (OUTPATIENT)
Dept: RADIOLOGY | Facility: HOSPITAL | Age: 38
Discharge: HOME | End: 2023-05-25
Attending: FAMILY MEDICINE

## 2023-05-25 DIAGNOSIS — M54.50 LOW BACK PAIN: Primary | ICD-10-CM

## 2023-05-25 DIAGNOSIS — M54.50 LOW BACK PAIN: ICD-10-CM

## 2023-05-25 PROCEDURE — 72100 X-RAY EXAM L-S SPINE 2/3 VWS: CPT | Mod: TC,FY,PO

## 2023-05-25 PROCEDURE — 72100 XR LUMBAR SPINE 2 OR 3 VIEWS: ICD-10-PCS | Mod: 26,,, | Performed by: RADIOLOGY

## 2023-05-25 PROCEDURE — 72100 X-RAY EXAM L-S SPINE 2/3 VWS: CPT | Mod: 26,,, | Performed by: RADIOLOGY

## 2023-05-29 DIAGNOSIS — M79.18 LUMBAR MUSCLE PAIN: ICD-10-CM

## 2023-05-29 NOTE — TELEPHONE ENCOUNTER
Good Afternoon,     Pt is requesting for a refill of:tiZANidine (ZANAFLEX) 4 MG tablet   Last filed:2/23/23  Last encounter: 2/23/23  Up coming apt: about to schedule an appt ( ODN will show in my chart on 5/30/23)   Pharmacy:[id number] - [place], [place] - [address] AT [facility] [address]  Is this something you can do?

## 2023-05-30 RX ORDER — TIZANIDINE 4 MG/1
TABLET ORAL
Qty: 90 TABLET | Refills: 2 | Status: SHIPPED | OUTPATIENT
Start: 2023-05-30 | End: 2023-06-29

## 2023-06-28 NOTE — PROGRESS NOTES
Established Patient - TeleHealth Visit    The patient location is: [place]  The chief complaint leading to consultation is: chronic pain     Visit type: audiovisual    Face to Face time with patient: 10-15 minutes  20 minutes of total time spent on the encounter, which includes face to face time and non-face to face time preparing to see the patient (eg, review of tests), Obtaining and/or reviewing separately obtained history, Documenting clinical information in the electronic or other health record, Independently interpreting results (not separately reported) and communicating results to the patient/family/caregiver, or Care coordination (not separately reported).     Each patient to whom he or she provides medical services by telemedicine is:  (1) informed of the relationship between the physician and patient and the respective role of any other health care provider with respect to management of the patient; and (2) notified that he or she may decline to receive medical services by telemedicine and may withdraw from such care at any time.      Established Patient Chronic Pain Note (Follow up visit)    Chief Complaint:   Pain  Chief Complaint   Patient presents with    Pain       SUBJECTIVE:  Interval History (6/29/2023): Patient was last seen on 2/23/2023. she presents today for follow-up for medication refill.  Since the last visit, she reports she had an injury at work where she was lifting a 300 lb female, and she immediately hurt her back.  She quit that job and is now doing more sedentary work.  She feels her pain is still severe.  Pain starts in the low back and goes into both legs and feels that her feet are crampy.  She continues taking gabapentin 3 times a day, but she does not feel this is providing adequate pain relief.  She recently had an MRI as well.  She continues taking the muscle relaxer.  She was recently given Toradol by mouth in Toradol IM today in clinic.  She took a Medrol Dosepak in the  beginning of the injury about 6 weeks ago.  She has not had any steroids recently.  She plans to restart physical therapy now that her job is more flexible.    Interval History (2/23/2023):  Simon Reynolds presents today for follow-up visit.  Patient was last seen about 6 weeks ago . At that visit, the plan was to start physical therapy.  She was not able to attend regularly due to work schedule.  She has been doing at home exercises and stretching, which she feels is helping her pain overall.  She still feels that she is stiff and sore, but her pain is decreased.  She is requesting a different muscle relaxer today as Flexeril is not helping.  She tried Robaxin in the past with limited pain relief as well. Patient reports pain as 6/10 today.    Interval HPI (01/18/2023):  Simon Reynolds is a 37 y.o. female who presents to the clinic for a follow-up appointment for lower back pain. Since the last visit, Simon Reynolds states the pain has been improving. Current pain intensity is 6/10.  At the last visit, she was referred to physical therapy along with refills provided of Flexeril and increasing gabapentin to 400 t.i.d..  She was continued on Pamelor and Topamax.  She has completed her initial evaluation physical therapy recently, no set up for the next several weeks to undergo treatments 2 times weekly.  Patient denies night fever/night sweats, urinary incontinence, bowel incontinence, significant weight loss, significant motor weakness and loss of sensations.    Pain Disability Index Review:  Last 3 PDI Scores 8/10/2022   Pain Disability Index (PDI) 49     Interval History (11/10/2022):  Simon Reynolds presents today for follow-up visit.  Patient was last seen on 8/29/2022. At that visit, the plan was to start physical therapy. She will start going next month. She feels the pain is worsening and may be more open to procedures now. She feels more comfortable with our clinic than she has at her  "previous practice. Patient reports pain as 9/10 today.     Interval History (8/29/2022):  Simon Reynolds presents today for follow-up visit.  Patient was last seen about 3 weeks ago. She presents today on telemedicine visit to discuss MRI results (ordered by PCP). She c/o low back pain + BLE radiculopathy. She reports that she is uninterested in procedures because of "bad reactions in the past." She states that she refused physical therapy while she was on WC because "no one would tell her what was wrong with her". She is inquisitive about her condition today as well.        Initial HPI (8/10/22):  Simon Reynolds is a 36 y.o. female who presents to the clinic for the evaluation of lower back and leg pain.   She was referred by her primary care provider for further evaluation and management of this pain.  She has a past medical history of migraines, epilepsy, and multiple other medical comorbidities as listed in her chart.  The pain started 2-3 years ago following a work related injury and symptoms have been unchanged.The pain is located in the lumbosacral area and radiates to the lower extremities.  The pain is described as  Sharp, stabbing, throbbing, aching, electric, numbness/tingling  and is rated as 7/10. The pain is rated with a score of  6/10 on the BEST day and a score of 10/10 on the WORST day.  Symptoms interfere with daily activity. The pain is exacerbated by bending, squatting.  The pain is mitigated by heat/ice and laying down.        Non-Pharmacologic Treatments:  Physical Therapy/Home Exercise: yes  Ice/Heat:yes  TENS: yes  Acupuncture: no  Massage: yes  Chiropractic: no    Other: no        Pain Medications:  - Opioids: Tramadol, Norco, Percocet, methadone  - Adjuvant Medications: NSAIDs, gabapentin, Cymbalta, Tylenol, baclofen, Zanaflex, Flexeril, lidocaine patch, Celebrex, amitriptyline, nortriptyline, Topamax, Voltaren gel  - Anti-Coagulants: None        Pain Procedures:   Externally: " "Previous cervical and lumbar interventions - per reporting: had several (>5) lum ESIs & reports having "bad reaction" to interventional pain procedures in the past (once being unable to walk after)        Imaging (Reviewed on 06/29/2023):      8/24/2022 MRI Lumbar Spine Without Contrast  FINDINGS:  The distal cord and conus appear intrinsically normal.     The cauda equina nerve roots appear normal     Partially included on this exam is a fluid signal intensity structure in the right lateral epidural space at the T11 and T12 vertebral body level.  The lesion displays fluid signal intensity characteristics and extends into the right T12-L1 neural foramen.  There is mild chronic remodeling of the posterior aspect of the T12 vertebral body and right T12 pedicle.  Approximate size 12.3 x 4.8 mm transversely with craniocaudal length of 33.9 mm.  Probably represents a chronic benign fluid signal intensity collection such as an arachnoid cyst or perineural cyst/diverticulum.     T12-L1: Disc level unremarkable.     L1-2:     The disc level is unremarkable.     L2-3:     The disc level is unremarkable.     L3-4:     Minor facet arthrosis.     L4-5:     Minor disc desiccation with annular bulge.  Moderate left and mild right hypertrophic facet arthrosis.     L5-S1:    Unremarkable.  Impression:   Minor L4-5 disc bulge with moderate left and mild right facet arthrosis.  Mild foraminal stenosis.  Fluid signal intensity structure at the right T11/T12 level most consistent with benign fluid collection such as an arachnoid cyst or perineural diverticulum.  See above for detail.        CT abdomen pelvis 10/09/2021:  The visualized lung bases are clear. The heart is normal in size.     The right hepatic lobe measures 19.5 cm in span. The left hepatic lobe measures 7.9 cm in span. The spleen is normal in size. The liver and spleen are normal in density.     There is some linear increased density dependently in the gallbladder which " is probably related to a small fold in the gallbladder rather than representing any gallstones. Otherwise, the gallbladder and biliary ductal system are normal.     The pancreas, adrenal glands, and right kidney are normal. One or two small cortical cysts are seen anteriorly in the left upper pole renal cortex. There is no obvious renal or ureteral calculus or hydronephrosis.     The anterior wall of the urinary bladder appears slightly thickened, which, at least in part, may be related to the partially decompressed state of the urinary bladder.     The uterus is midline and anteverted. The large right ovarian cyst seen on the previous CT has resolved in the interim, with no abnormal ovarian mass currently seen. There is no free fluid within the pelvis and no ascites is noted. No adenopathy is seen.     The appendix is normal. No abnormal bowel distention or bowel wall thickening is noted. There are multiple small diverticula along the descending colon and at the junction of the descending colon and sigmoid colon. No diverticulitis.     The regional skeleton is intact. No fracture or dislocation is evident. No lytic or blastic bone lesion is seen.        REVIEW OF SYSTEMS:    GENERAL:  No weight loss, malaise or fevers.  HEENT:   No recent changes in vision or hearing  NECK:  Negative for lumps, no difficulty with swallowing.  RESPIRATORY:  Negative for cough, wheezing or shortness of breath, patient denies any recent URI.  CARDIOVASCULAR:  Negative for chest pain, leg swelling or palpitations.  GI:  Negative for abdominal discomfort, blood in stools or black stools or change in bowel habits.  MUSCULOSKELETAL:  See HPI.  SKIN:  Negative for lesions, rash, and itching.  PSYCH:  No mood disorder or recent psychosocial stressors.  Patients sleep is not disturbed secondary to pain.  HEMATOLOGY/LYMPHOLOGY:  Negative for prolonged bleeding, bruising easily or swollen nodes.  Patient is not currently taking any  anti-coagulants  NEURO:   No history of headaches, syncope, paralysis, seizures or tremors.  All other reviewed and negative other than HPI.      OBJECTIVE:    PHYSICAL EXAMINATION:  Telemedicine Exam  Vitals:    06/29/23 1235   Resp: 17     There is no height or weight on file to calculate BMI.   (reviewed on 6/29/2023)     GENERAL: Well appearing, in no acute distress, alert and oriented x3.  Cooperative.  PSYCH:  Mood and affect appropriate.  SKIN: Skin color & texture with no obvious abnormalities.    HEAD/FACE:  Normocephalic, atraumatic.    PULM:  No difficulty breathing. No nasal flaring.   EXTREMITIES: No obvious deformities. Moving all extremities well, appears to have symmetric strength throughout.  MUSCULOSKELETAL: No obvious atrophy abnormalities are noted.   NEURO: No obvious neurologic deficit.   GAIT: sitting.     Physical Exam: last in clinic visit:  GENERAL: Well appearing, in no acute distress, alert and oriented x3.  PSYCH:  Mood and affect appropriate.  SKIN: Skin color, texture, turgor normal, no rashes or lesions.  HEAD/FACE:  Normocephalic, atraumatic. Cranial nerves grossly intact.  CV: RRR with palpation of the radial artery.  PULM: No evidence of respiratory difficulty, symmetric chest rise.  GI:  Soft and non-tender.  BACK: Straight leg raising in the sitting and supine positions is equivocal to radicular pain.  Mild-to-moderate pain to palpation over the facet joints of the lumbar spine and lumbar paraspinals.  Limited range of motion secondary to pain reproduction.  EXTREMITIES: Peripheral joint ROM is full and pain free without obvious instability or laxity in all four extremities. No deformities, edema, or skin discoloration. Good capillary refill.  MUSCULOSKELETAL: Shoulder, hip, and knee provocative maneuvers are negative.  There is no pain with palpation over the sacroiliac joints bilaterally.  FABERs test is negative.  FADIRs test is negative.   Bilateral upper and lower extremity  "strength is normal and symmetric.  No atrophy or tone abnormalities are noted.  NEURO: Bilateral upper and lower extremity coordination and muscle stretch reflexes are physiologic and symmetric.  Plantar response are downgoing. No clonus.  No loss of sensation is noted.  GAIT: normal.        ASSESSMENT: 37 y.o. year old female with lower back pain, consistent with     1. Bilateral lumbar radiculopathy  pregabalin (LYRICA) 150 MG capsule      2. Acute exacerbation of chronic low back pain  methylPREDNISolone (MEDROL DOSEPACK) 4 mg tablet      3. Lumbar muscle pain        4. Lumbar radiculopathy, chronic            PLAN:   1. Interventional: We previously discussed trying lumbar MBB, but patient has been hesitant to move forward with procedures, although more open than she was previously. She would like to discuss further with Dr. Hernandez; will try physical therapy first. She reports having "bad reaction" to interventional pain procedures in the past (once being unable to walk after "block"). She feels that procedures in the past were just put in "random spots"; we have assured patient that we use x-ray guided techniques to ensure proper placement although I still cannot guarantee resolution of pain.  Patient reports today that she would like to consider an injection if pain does not improve with Lyrica.     2. Pharmacologic:   - Will prescribe Medrol Dose pack with instructions - for acute pain flare:  Day 1: 2 tablets before breakfast, 1 tablet after lunch, 1 tablet after dinner, 2 tablets at bedtime   Day 2: 1 tablet before breakfast, 1 tablet after lunch, 1 tablet after dinner, 2 tablets at bedtime   Day 3: 1 tablet before breakfast, 1 tablet after lunch, 1 tablet after dinner, 1 tablet at bedtime   Day 4: 1 tablet before breakfast, 1 tablet after lunch, 1 tablet at bedtime   Day 5: 1 tablet before breakfast, 1 tablet at bedtime   Day 6: 1 tablet before breakfast.   - Start Lyrica 150mg BID.  Consider further " Increase if no improvement after 1-3 weeks.  Patient informed not to stop taking if she does not find significant pain relief. D/c gabapentin 400mg TID--to see if Lyrica is more effective  - Refill ibuprofen 600 mg TID PRN.   - Refill Zanaflex 4mg TID PRN muscle spasms (or can take 1/2 tablet). Patient understands this may cause drowsiness. Previously failedFlexeril 10mg QHS- BID PRN - ineffective; also failed Robaxin in the past.  - Continue Topamax 100mg BID - from PCP - for epilepsy & migraines.  - Anticoagulation use: None.      3. Rehabilitative: Start PT with passive modalities, including ice and heat, and active modalities, including a regimen for stretching and strengthening.  Order sent internally.  Continue at-home exercises and stretching, which is helping.     4. Diagnostic:   - Request lumbar MRI from [facility] open MRI.  - Previously sent Request for imaging, office visit notes, and procedure notes from Miranda Jones MD ([facility] [place] [address] , [place] [address]-Phone: [phone]). Nothing received.     5. Follow up: 6 week follow-up - virtual visit      - Direct patient care provided: about 20 minutes . Over 50% of the time was spent counseling/educating the patient. Total time spent on patient care including prep time reviewing the chart before the visit, time spent with patient during the visit, and the time spent after the visit reviewing studies, documenting note, obtaining information from collaborative providers, etc.: >40 minutes.    - This condition does not require this patient to take time off of work, and the primary goal of our Pain Management services is to improve the patient's functional capacity.   - I discussed the risks, benefits, and alternatives to potential treatment options. All questions and concerns were fully addressed today in clinic.         Lashawn Morgan PA-C  Interventional Pain Management - [facility]  [place]    Disclaimer:  This note was prepared using voice recognition system and is likely to have sound alike errors that may have been overlooked even after proof reading.  Please call me with any questions.

## 2023-06-29 ENCOUNTER — OFFICE VISIT (OUTPATIENT)
Dept: INTERNAL MEDICINE | Facility: CLINIC | Age: 38
End: 2023-06-29
Payer: MEDICAID

## 2023-06-29 ENCOUNTER — OFFICE VISIT (OUTPATIENT)
Dept: PAIN MEDICINE | Facility: CLINIC | Age: 38
End: 2023-06-29
Payer: MEDICAID

## 2023-06-29 VITALS
TEMPERATURE: 99 F | HEART RATE: 86 BPM | BODY MASS INDEX: 27.7 KG/M2 | DIASTOLIC BLOOD PRESSURE: 70 MMHG | RESPIRATION RATE: 17 BRPM | HEIGHT: 64 IN | SYSTOLIC BLOOD PRESSURE: 100 MMHG

## 2023-06-29 DIAGNOSIS — M54.16 BILATERAL LUMBAR RADICULOPATHY: Primary | ICD-10-CM

## 2023-06-29 DIAGNOSIS — T40.2X5A THERAPEUTIC OPIOID-INDUCED CONSTIPATION (OIC): ICD-10-CM

## 2023-06-29 DIAGNOSIS — E53.8 B12 DEFICIENCY: ICD-10-CM

## 2023-06-29 DIAGNOSIS — G43.909 MIGRAINE WITHOUT STATUS MIGRAINOSUS, NOT INTRACTABLE, UNSPECIFIED MIGRAINE TYPE: ICD-10-CM

## 2023-06-29 DIAGNOSIS — M54.16 LUMBAR RADICULOPATHY, CHRONIC: Primary | ICD-10-CM

## 2023-06-29 DIAGNOSIS — K59.03 THERAPEUTIC OPIOID-INDUCED CONSTIPATION (OIC): ICD-10-CM

## 2023-06-29 DIAGNOSIS — K59.00 CONSTIPATION, UNSPECIFIED CONSTIPATION TYPE: ICD-10-CM

## 2023-06-29 DIAGNOSIS — M54.50 ACUTE EXACERBATION OF CHRONIC LOW BACK PAIN: ICD-10-CM

## 2023-06-29 DIAGNOSIS — M54.16 LUMBAR RADICULOPATHY, CHRONIC: ICD-10-CM

## 2023-06-29 DIAGNOSIS — M79.18 LUMBAR MUSCLE PAIN: ICD-10-CM

## 2023-06-29 DIAGNOSIS — G89.29 ACUTE EXACERBATION OF CHRONIC LOW BACK PAIN: ICD-10-CM

## 2023-06-29 PROCEDURE — 3008F BODY MASS INDEX DOCD: CPT | Mod: CPTII,,, | Performed by: FAMILY MEDICINE

## 2023-06-29 PROCEDURE — 3074F PR MOST RECENT SYSTOLIC BLOOD PRESSURE < 130 MM HG: ICD-10-PCS | Mod: CPTII,,, | Performed by: FAMILY MEDICINE

## 2023-06-29 PROCEDURE — 3044F PR MOST RECENT HEMOGLOBIN A1C LEVEL <7.0%: ICD-10-PCS | Mod: CPTII,95,, | Performed by: PHYSICIAN ASSISTANT

## 2023-06-29 PROCEDURE — 99213 OFFICE O/P EST LOW 20 MIN: CPT | Mod: PBBFAC,PO | Performed by: FAMILY MEDICINE

## 2023-06-29 PROCEDURE — 3044F HG A1C LEVEL LT 7.0%: CPT | Mod: CPTII,,, | Performed by: FAMILY MEDICINE

## 2023-06-29 PROCEDURE — 3008F PR BODY MASS INDEX (BMI) DOCUMENTED: ICD-10-PCS | Mod: CPTII,,, | Performed by: FAMILY MEDICINE

## 2023-06-29 PROCEDURE — 1159F PR MEDICATION LIST DOCUMENTED IN MEDICAL RECORD: ICD-10-PCS | Mod: CPTII,95,, | Performed by: PHYSICIAN ASSISTANT

## 2023-06-29 PROCEDURE — 1160F PR REVIEW ALL MEDS BY PRESCRIBER/CLIN PHARMACIST DOCUMENTED: ICD-10-PCS | Mod: CPTII,95,, | Performed by: PHYSICIAN ASSISTANT

## 2023-06-29 PROCEDURE — 3044F PR MOST RECENT HEMOGLOBIN A1C LEVEL <7.0%: ICD-10-PCS | Mod: CPTII,,, | Performed by: FAMILY MEDICINE

## 2023-06-29 PROCEDURE — 99215 PR OFFICE/OUTPT VISIT, EST, LEVL V, 40-54 MIN: ICD-10-PCS | Mod: 95,,, | Performed by: PHYSICIAN ASSISTANT

## 2023-06-29 PROCEDURE — 3078F DIAST BP <80 MM HG: CPT | Mod: CPTII,,, | Performed by: FAMILY MEDICINE

## 2023-06-29 PROCEDURE — 96372 THER/PROPH/DIAG INJ SC/IM: CPT | Mod: PBBFAC,PO

## 2023-06-29 PROCEDURE — 3074F SYST BP LT 130 MM HG: CPT | Mod: CPTII,,, | Performed by: FAMILY MEDICINE

## 2023-06-29 PROCEDURE — 99999 PR PBB SHADOW E&M-EST. PATIENT-LVL III: CPT | Mod: PBBFAC,,, | Performed by: FAMILY MEDICINE

## 2023-06-29 PROCEDURE — 1159F MED LIST DOCD IN RCRD: CPT | Mod: CPTII,95,, | Performed by: PHYSICIAN ASSISTANT

## 2023-06-29 PROCEDURE — 99215 OFFICE O/P EST HI 40 MIN: CPT | Mod: 95,,, | Performed by: PHYSICIAN ASSISTANT

## 2023-06-29 PROCEDURE — 1160F RVW MEDS BY RX/DR IN RCRD: CPT | Mod: CPTII,95,, | Performed by: PHYSICIAN ASSISTANT

## 2023-06-29 PROCEDURE — 3044F HG A1C LEVEL LT 7.0%: CPT | Mod: CPTII,95,, | Performed by: PHYSICIAN ASSISTANT

## 2023-06-29 PROCEDURE — 99214 OFFICE O/P EST MOD 30 MIN: CPT | Mod: S$PBB,,, | Performed by: FAMILY MEDICINE

## 2023-06-29 PROCEDURE — 1159F MED LIST DOCD IN RCRD: CPT | Mod: CPTII,,, | Performed by: FAMILY MEDICINE

## 2023-06-29 PROCEDURE — 99214 PR OFFICE/OUTPT VISIT, EST, LEVL IV, 30-39 MIN: ICD-10-PCS | Mod: S$PBB,,, | Performed by: FAMILY MEDICINE

## 2023-06-29 PROCEDURE — 99999 PR PBB SHADOW E&M-EST. PATIENT-LVL III: ICD-10-PCS | Mod: PBBFAC,,, | Performed by: FAMILY MEDICINE

## 2023-06-29 PROCEDURE — 3078F PR MOST RECENT DIASTOLIC BLOOD PRESSURE < 80 MM HG: ICD-10-PCS | Mod: CPTII,,, | Performed by: FAMILY MEDICINE

## 2023-06-29 PROCEDURE — 1159F PR MEDICATION LIST DOCUMENTED IN MEDICAL RECORD: ICD-10-PCS | Mod: CPTII,,, | Performed by: FAMILY MEDICINE

## 2023-06-29 RX ORDER — PHENAZOPYRIDINE HYDROCHLORIDE 200 MG/1
200 TABLET, FILM COATED ORAL
COMMUNITY
End: 2023-09-11

## 2023-06-29 RX ORDER — ONDANSETRON 4 MG/1
TABLET, ORALLY DISINTEGRATING ORAL
COMMUNITY
Start: 2023-01-21 | End: 2023-06-29 | Stop reason: SDUPTHER

## 2023-06-29 RX ORDER — TOPIRAMATE 200 MG/1
200 TABLET ORAL EVERY MORNING
COMMUNITY
Start: 2023-06-12 | End: 2023-12-11 | Stop reason: SDUPTHER

## 2023-06-29 RX ORDER — DICYCLOMINE HYDROCHLORIDE 10 MG/1
CAPSULE ORAL
COMMUNITY
Start: 2023-01-21 | End: 2023-09-11

## 2023-06-29 RX ORDER — CYANOCOBALAMIN 1000 UG/ML
1000 INJECTION, SOLUTION INTRAMUSCULAR; SUBCUTANEOUS ONCE
Status: COMPLETED | OUTPATIENT
Start: 2023-06-29 | End: 2023-06-29

## 2023-06-29 RX ORDER — RIMEGEPANT SULFATE 75 MG/75MG
75 TABLET, ORALLY DISINTEGRATING ORAL DAILY PRN
COMMUNITY
Start: 2023-06-12 | End: 2023-12-11 | Stop reason: SDUPTHER

## 2023-06-29 RX ORDER — KETOROLAC TROMETHAMINE 30 MG/ML
60 INJECTION, SOLUTION INTRAMUSCULAR; INTRAVENOUS
Status: COMPLETED | OUTPATIENT
Start: 2023-06-29 | End: 2023-06-29

## 2023-06-29 RX ORDER — METHYLPREDNISOLONE 4 MG/1
TABLET ORAL
Qty: 21 EACH | Refills: 0 | Status: SHIPPED | OUTPATIENT
Start: 2023-06-29 | End: 2024-02-05

## 2023-06-29 RX ORDER — ORPHENADRINE CITRATE 30 MG/ML
60 INJECTION INTRAMUSCULAR; INTRAVENOUS
Status: COMPLETED | OUTPATIENT
Start: 2023-06-29 | End: 2023-06-29

## 2023-06-29 RX ORDER — PREGABALIN 150 MG/1
150 CAPSULE ORAL 2 TIMES DAILY
Qty: 60 CAPSULE | Refills: 1 | Status: SHIPPED | OUTPATIENT
Start: 2023-06-29 | End: 2023-09-11 | Stop reason: SINTOL

## 2023-06-29 RX ORDER — ONDANSETRON 4 MG/1
TABLET, ORALLY DISINTEGRATING ORAL
Qty: 90 TABLET | Refills: 1 | Status: SHIPPED | OUTPATIENT
Start: 2023-06-29 | End: 2023-12-11 | Stop reason: SDUPTHER

## 2023-06-29 RX ORDER — CYANOCOBALAMIN 1000 UG/ML
1000 INJECTION, SOLUTION INTRAMUSCULAR; SUBCUTANEOUS
Status: DISCONTINUED | OUTPATIENT
Start: 2023-06-29 | End: 2023-06-29

## 2023-06-29 RX ADMIN — KETOROLAC TROMETHAMINE 60 MG: 60 INJECTION, SOLUTION INTRAMUSCULAR at 11:06

## 2023-06-29 RX ADMIN — CYANOCOBALAMIN 1000 MCG: 1000 INJECTION, SOLUTION INTRAMUSCULAR at 11:06

## 2023-06-29 RX ADMIN — ORPHENADRINE CITRATE 60 MG: 60 INJECTION INTRAMUSCULAR; INTRAVENOUS at 11:06

## 2023-06-29 NOTE — PROGRESS NOTES
Subjective:       Patient ID: Simon Reynolds is a 37 y.o. female.    Chief Complaint: Migraine    Headache   This is a chronic problem. The current episode started more than 1 year ago. The problem occurs constantly. The problem has been gradually worsening. Pertinent negatives include no abdominal pain.   Review of Systems   Respiratory:  Negative for shortness of breath.    Cardiovascular:  Negative for chest pain.   Gastrointestinal:  Negative for abdominal pain.   Neurological:  Positive for headaches.     Objective:      Physical Exam  Vitals and nursing note reviewed.   Constitutional:       General: She is not in acute distress.     Appearance: Normal appearance. She is well-developed. She is not diaphoretic.   HENT:      Head: Normocephalic and atraumatic.   Pulmonary:      Effort: Pulmonary effort is normal. No respiratory distress.      Breath sounds: Normal breath sounds. No wheezing.   Skin:     General: Skin is warm and dry.      Findings: No erythema or rash.   Neurological:      Mental Status: She is alert.       Assessment:       1. Lumbar radiculopathy, chronic    2. Migraine without status migrainosus, not intractable, unspecified migraine type    3. B12 deficiency    4. Constipation, unspecified constipation type    5. Therapeutic opioid-induced constipation (OIC)        Plan:     Problem List Items Addressed This Visit          Neuro    Migraine without status migrainosus, not intractable    Overview     Chronic, Stable, cont topamax         Lumbar radiculopathy, chronic - Primary       Endocrine    B12 deficiency    Overview     Chronic, Stable, cont b12 otc            GI    Constipation    Overview     Chronic, Stable, cont linzess         Therapeutic opioid-induced constipation (OIC)    Relevant Medications    linaCLOtide (LINZESS) 72 mcg Cap capsule

## 2023-06-29 NOTE — LETTER
June 29, 2023      [facility]  [address] [place] [address]  Phone: [phone]  Fax: [phone]       Patient: Simon Reynolds   YOB: 1985  Date of Visit: 06/29/2023    To Whom It May Concern:    Alecia Reynolds  was at [facility] on 06/29/2023. The patient may return to work/school on 6/29/2023 with no restrictions. If you have any questions or concerns, or if I can be of further assistance, please do not hesitate to contact me.    Sincerely,          Ade Torres MA

## 2023-06-29 NOTE — PROGRESS NOTES
LV: Ketorolac  RV: Orphenadrine  Pt tolerated well.  Pt was instructed to wait 15 mins after injection.  Pt verbalized understanding.

## 2023-07-03 ENCOUNTER — TELEPHONE (OUTPATIENT)
Dept: NEUROSURGERY | Facility: CLINIC | Age: 38
End: 2023-07-03
Payer: MEDICAID

## 2023-07-03 NOTE — TELEPHONE ENCOUNTER
Spoke with patient, who is requesting an appointment with Dr. Mendez before 07/14/2023 because she would be considered a new patient. The patient states that she had some imaging done and will have some scheduled that she would like  to review.     _     After reviewing the MD's schedule for that week. I will contact my supervisor to see if Dr. Mendez is in the clinic 07/10-07/14. The patient verbalized understanding and I will be in contact with patient with an update.

## 2023-07-03 NOTE — TELEPHONE ENCOUNTER
----- Message from Ivette Bryson sent at 6/30/2023  4:25 PM CDT -----  Regarding: Appointment  Good evening,     Can someone please contact patient to schedule a follow up appointment.       Thanks,

## 2023-07-05 ENCOUNTER — TELEPHONE (OUTPATIENT)
Dept: NEUROSURGERY | Facility: CLINIC | Age: 38
End: 2023-07-05
Payer: MEDICAID

## 2023-07-05 NOTE — TELEPHONE ENCOUNTER
Spoke with patient, who mentioned that she would like to see Dr. Mendez for follow up review of MRI C- Spine. She requested an appointment prior to the 14th due to being considered a new patient. Patient has been scheduled with Dr. Mendez 07/11/2023 @ 4 PM. Patient will contact the office after scheduling her MRI C-Spine due to waiting on authorization. Patient verbalized understanding. She is aware that this scan has to be performed prior to see Dr. Mendez.

## 2023-07-14 ENCOUNTER — TELEPHONE (OUTPATIENT)
Dept: PAIN MEDICINE | Facility: CLINIC | Age: 38
End: 2023-07-14
Payer: MEDICAID

## 2023-09-11 ENCOUNTER — TELEPHONE (OUTPATIENT)
Dept: PAIN MEDICINE | Facility: CLINIC | Age: 38
End: 2023-09-11

## 2023-09-11 ENCOUNTER — OFFICE VISIT (OUTPATIENT)
Dept: PAIN MEDICINE | Facility: CLINIC | Age: 38
End: 2023-09-11
Payer: MEDICAID

## 2023-09-11 DIAGNOSIS — M54.16 LUMBAR RADICULOPATHY, CHRONIC: ICD-10-CM

## 2023-09-11 DIAGNOSIS — M79.18 LUMBAR MUSCLE PAIN: ICD-10-CM

## 2023-09-11 PROCEDURE — 99214 PR OFFICE/OUTPT VISIT, EST, LEVL IV, 30-39 MIN: ICD-10-PCS | Mod: 95,,, | Performed by: PHYSICAL MEDICINE & REHABILITATION

## 2023-09-11 PROCEDURE — 3044F HG A1C LEVEL LT 7.0%: CPT | Mod: CPTII,95,, | Performed by: PHYSICAL MEDICINE & REHABILITATION

## 2023-09-11 PROCEDURE — 99214 OFFICE O/P EST MOD 30 MIN: CPT | Mod: 95,,, | Performed by: PHYSICAL MEDICINE & REHABILITATION

## 2023-09-11 PROCEDURE — 3044F PR MOST RECENT HEMOGLOBIN A1C LEVEL <7.0%: ICD-10-PCS | Mod: CPTII,95,, | Performed by: PHYSICAL MEDICINE & REHABILITATION

## 2023-09-11 RX ORDER — GABAPENTIN 400 MG/1
400 CAPSULE ORAL 3 TIMES DAILY
Qty: 90 CAPSULE | Refills: 2 | Status: SHIPPED | OUTPATIENT
Start: 2023-09-11 | End: 2024-01-17 | Stop reason: SDUPTHER

## 2023-09-11 RX ORDER — TIZANIDINE 4 MG/1
TABLET ORAL
Qty: 90 TABLET | Refills: 2 | Status: SHIPPED | OUTPATIENT
Start: 2023-09-11 | End: 2023-11-29

## 2023-09-11 NOTE — PROGRESS NOTES
Established Patient - TeleHealth Visit    The patient location is: [place]  The chief complaint leading to consultation is: chronic pain     Visit type: audiovisual    Face to Face time with patient: 10-15 minutes  20 minutes of total time spent on the encounter, which includes face to face time and non-face to face time preparing to see the patient (eg, review of tests), Obtaining and/or reviewing separately obtained history, Documenting clinical information in the electronic or other health record, Independently interpreting results (not separately reported) and communicating results to the patient/family/caregiver, or Care coordination (not separately reported).     Each patient to whom he or she provides medical services by telemedicine is:  (1) informed of the relationship between the physician and patient and the respective role of any other health care provider with respect to management of the patient; and (2) notified that he or she may decline to receive medical services by telemedicine and may withdraw from such care at any time.      Established Patient Chronic Pain Note (Follow up visit)    Chief Complaint:   Lower back and leg pain      SUBJECTIVE:    Simon Reynolds is a 37 y.o. female presents today for follow-up continued back and leg pain.  She as the same type and quality of pain in her lumbosacral region with radiation to both of her lower extremities, left worse than right.  She has tried Lyrica, but was unable tolerate after 3 nights as she felt more alert and had hallucinations and discontinue the medication after these symptoms.  She still has some gabapentin remaining has been utilizing this while she finds effective and able tolerate.  She also has been using ibuprofen as Zanaflex p.r.n..  Current pain intensity is 8/10.      Patient denies night fever/night sweats, urinary incontinence, bowel incontinence, significant weight loss, significant motor weakness and loss of sensations.    Pain  Disability Index Review:      8/10/2022    12:54 PM   Last 3 PDI Scores   Pain Disability Index (PDI) 49         Interval History (6/29/2023): Patient was last seen on 2/23/2023. she presents today for follow-up for medication refill.  Since the last visit, she reports she had an injury at work where she was lifting a 300 lb female, and she immediately hurt her back.  She quit that job and is now doing more sedentary work.  She feels her pain is still severe.  Pain starts in the low back and goes into both legs and feels that her feet are crampy.  She continues taking gabapentin 3 times a day, but she does not feel this is providing adequate pain relief.  She recently had an MRI as well.  She continues taking the muscle relaxer.  She was recently given Toradol by mouth in Toradol IM today in clinic.  She took a Medrol Dosepak in the beginning of the injury about 6 weeks ago.  She has not had any steroids recently.  She plans to restart physical therapy now that her job is more flexible.    Interval History (2/23/2023):  Simon Reynolds presents today for follow-up visit.  Patient was last seen about 6 weeks ago . At that visit, the plan was to start physical therapy.  She was not able to attend regularly due to work schedule.  She has been doing at home exercises and stretching, which she feels is helping her pain overall.  She still feels that she is stiff and sore, but her pain is decreased.  She is requesting a different muscle relaxer today as Flexeril is not helping.  She tried Robaxin in the past with limited pain relief as well. Patient reports pain as 6/10 today.    Interval HPI (01/18/2023):  Simon Reynolds is a 37 y.o. female who presents to the clinic for a follow-up appointment for lower back pain. Since the last visit, Simon Reynolds states the pain has been improving. Current pain intensity is 6/10.  At the last visit, she was referred to physical therapy along with refills provided of  "Flexeril and increasing gabapentin to 400 t.i.d..  She was continued on Pamelor and Topamax.  She has completed her initial evaluation physical therapy recently, no set up for the next several weeks to undergo treatments 2 times weekly.      Interval History (11/10/2022):  Simon Reynolds presents today for follow-up visit.  Patient was last seen on 8/29/2022. At that visit, the plan was to start physical therapy. She will start going next month. She feels the pain is worsening and may be more open to procedures now. She feels more comfortable with our clinic than she has at her previous practice. Patient reports pain as 9/10 today.     Interval History (8/29/2022):  Simon Reynolds presents today for follow-up visit.  Patient was last seen about 3 weeks ago. She presents today on telemedicine visit to discuss MRI results (ordered by PCP). She c/o low back pain + BLE radiculopathy. She reports that she is uninterested in procedures because of "bad reactions in the past." She states that she refused physical therapy while she was on WC because "no one would tell her what was wrong with her". She is inquisitive about her condition today as well.        Initial HPI (8/10/22):  Simon Reynolds is a 36 y.o. female who presents to the clinic for the evaluation of lower back and leg pain.   She was referred by her primary care provider for further evaluation and management of this pain.  She has a past medical history of migraines, epilepsy, and multiple other medical comorbidities as listed in her chart.  The pain started 2-3 years ago following a work related injury and symptoms have been unchanged.The pain is located in the lumbosacral area and radiates to the lower extremities.  The pain is described as  Sharp, stabbing, throbbing, aching, electric, numbness/tingling  and is rated as 7/10. The pain is rated with a score of  6/10 on the BEST day and a score of 10/10 on the WORST day.  Symptoms interfere with " "daily activity. The pain is exacerbated by bending, squatting.  The pain is mitigated by heat/ice and laying down.        Non-Pharmacologic Treatments:  Physical Therapy/Home Exercise: yes  Ice/Heat:yes  TENS: yes  Acupuncture: no  Massage: yes  Chiropractic: no    Other: no        Pain Medications:  - Opioids: Tramadol, Norco, Percocet, methadone  - Adjuvant Medications: NSAIDs, gabapentin, Cymbalta, Tylenol, baclofen, Zanaflex, Flexeril, lidocaine patch, Celebrex, amitriptyline, nortriptyline, Topamax, Voltaren gel  - Anti-Coagulants: None        Pain Procedures:   Externally: Previous cervical and lumbar interventions - per reporting: had several (>5) lum ESIs & reports having "bad reaction" to interventional pain procedures in the past (once being unable to walk after)        Imaging (Reviewed on 09/11/2023):      8/24/2022 MRI Lumbar Spine Without Contrast  FINDINGS:  The distal cord and conus appear intrinsically normal.     The cauda equina nerve roots appear normal     Partially included on this exam is a fluid signal intensity structure in the right lateral epidural space at the T11 and T12 vertebral body level.  The lesion displays fluid signal intensity characteristics and extends into the right T12-L1 neural foramen.  There is mild chronic remodeling of the posterior aspect of the T12 vertebral body and right T12 pedicle.  Approximate size 12.3 x 4.8 mm transversely with craniocaudal length of 33.9 mm.  Probably represents a chronic benign fluid signal intensity collection such as an arachnoid cyst or perineural cyst/diverticulum.     T12-L1: Disc level unremarkable.     L1-2:     The disc level is unremarkable.     L2-3:     The disc level is unremarkable.     L3-4:     Minor facet arthrosis.     L4-5:     Minor disc desiccation with annular bulge.  Moderate left and mild right hypertrophic facet arthrosis.     L5-S1:    Unremarkable.  Impression:   Minor L4-5 disc bulge with moderate left and mild " right facet arthrosis.  Mild foraminal stenosis.  Fluid signal intensity structure at the right T11/T12 level most consistent with benign fluid collection such as an arachnoid cyst or perineural diverticulum.  See above for detail.        CT abdomen pelvis 10/09/2021:  The visualized lung bases are clear. The heart is normal in size.     The right hepatic lobe measures 19.5 cm in span. The left hepatic lobe measures 7.9 cm in span. The spleen is normal in size. The liver and spleen are normal in density.     There is some linear increased density dependently in the gallbladder which is probably related to a small fold in the gallbladder rather than representing any gallstones. Otherwise, the gallbladder and biliary ductal system are normal.     The pancreas, adrenal glands, and right kidney are normal. One or two small cortical cysts are seen anteriorly in the left upper pole renal cortex. There is no obvious renal or ureteral calculus or hydronephrosis.     The anterior wall of the urinary bladder appears slightly thickened, which, at least in part, may be related to the partially decompressed state of the urinary bladder.     The uterus is midline and anteverted. The large right ovarian cyst seen on the previous CT has resolved in the interim, with no abnormal ovarian mass currently seen. There is no free fluid within the pelvis and no ascites is noted. No adenopathy is seen.     The appendix is normal. No abnormal bowel distention or bowel wall thickening is noted. There are multiple small diverticula along the descending colon and at the junction of the descending colon and sigmoid colon. No diverticulitis.     The regional skeleton is intact. No fracture or dislocation is evident. No lytic or blastic bone lesion is seen.        REVIEW OF SYSTEMS:    GENERAL:  No weight loss, malaise or fevers.  HEENT:   No recent changes in vision or hearing  NECK:  Negative for lumps, no difficulty with  swallowing.  RESPIRATORY:  Negative for cough, wheezing or shortness of breath, patient denies any recent URI.  CARDIOVASCULAR:  Negative for chest pain, leg swelling or palpitations.  GI:  Negative for abdominal discomfort, blood in stools or black stools or change in bowel habits.  MUSCULOSKELETAL:  See HPI.  SKIN:  Negative for lesions, rash, and itching.  PSYCH:  No mood disorder or recent psychosocial stressors.  Patients sleep is not disturbed secondary to pain.  HEMATOLOGY/LYMPHOLOGY:  Negative for prolonged bleeding, bruising easily or swollen nodes.  Patient is not currently taking any anti-coagulants  NEURO:   No history of headaches, syncope, paralysis, seizures or tremors.  All other reviewed and negative other than HPI.      OBJECTIVE:    PHYSICAL EXAMINATION:  Telemedicine Exam  There were no vitals filed for this visit.    There is no height or weight on file to calculate BMI.   (reviewed on 9/11/2023)     GENERAL: Well appearing, in no acute distress, alert and oriented x3.  Cooperative.  PSYCH:  Mood and affect appropriate.  SKIN: Skin color & texture with no obvious abnormalities.    HEAD/FACE:  Normocephalic, atraumatic.    PULM:  No difficulty breathing. No nasal flaring.   EXTREMITIES: No obvious deformities. Moving all extremities well, appears to have symmetric strength throughout.  MUSCULOSKELETAL: No obvious atrophy abnormalities are noted.   NEURO: No obvious neurologic deficit.   GAIT: sitting.         ASSESSMENT: 37 y.o. year old female with lower back pain, consistent with     1. Lumbar radiculopathy, chronic  gabapentin (NEURONTIN) 400 MG capsule    Case Request-RAD/Other Procedure Area: Bilateral L4/5 TF MIKE      2. Lumbar muscle pain  tiZANidine (ZANAFLEX) 4 MG tablet            PLAN:   1. Interventional:  Scheduled for bilateral L4-5 TFESI for diagnostic and therapeutic purposes.  Explained procedure in detail, risks, benefits, alternatives the patient today and she elected to pursue  this intervention at this time.     2. Pharmacologic:   - discontinued Lyrica due to side effects and restart gabapentin 400mg TID as she found this effective and was able to tolerate  - continue ibuprofen 600 mg TID PRN.   - Refill Zanaflex 4mg TID PRN muscle spasms (or can take 1/2 tablet). Patient understands this may cause drowsiness. Previously failedFlexeril 10mg QHS- BID PRN - ineffective; also failed Robaxin in the past.  - Continue Topamax 100mg BID - from PCP - for epilepsy & migraines.  - Anticoagulation use: None.              3. Rehabilitative:  Advised patient to continue with activities as tolerated     4. Diagnostic:   - reviewed labs and imaging available  - Previously sent Request for imaging, office visit notes, and procedure notes from Miranda Jones MD ([id number] [facility] , [place] [address]-[place]: [phone]). Nothing received.     5. Follow up:  4-6 weeks postprocedure       - This condition does not require this patient to take time off of work, and the primary goal of our Pain Management services is to improve the patient's functional capacity.     - I discussed the risks, benefits, and alternatives to potential treatment options. All questions and concerns were fully addressed today in clinic.       Michele Hernandez MD  Interventional Pain Medicine  [facility] [place]      Disclaimer:  This note was prepared using voice recognition system and is likely to have sound alike errors that may have been overlooked even after proof reading.  Please call me with any questions.

## 2023-09-13 ENCOUNTER — TELEPHONE (OUTPATIENT)
Dept: PAIN MEDICINE | Facility: CLINIC | Age: 38
End: 2023-09-13
Payer: MEDICAID

## 2023-09-13 ENCOUNTER — PATIENT MESSAGE (OUTPATIENT)
Dept: PAIN MEDICINE | Facility: CLINIC | Age: 38
End: 2023-09-13
Payer: MEDICAID

## 2023-09-13 NOTE — TELEPHONE ENCOUNTER
Called pt to procedure. Procedure for 9/28 and follow up scheduled. Procedure Instructions reviewed and sent via [employer]. Pt verbalized understanding. All questions answered.

## 2023-09-13 NOTE — TELEPHONE ENCOUNTER
----- Message from Shanna Vick sent at 9/13/2023  8:23 AM CDT -----  Contact: Patient, [phone]  Patient is returning a phone call.  Who left a message for the patient: ?  Does patient know what this is regarding:  Procedure  Would you like a call back, or a response through your [facility] portal?:   Call back  Comments:  Missed your call, please call her back. Thanks.

## 2023-09-20 ENCOUNTER — DOCUMENTATION ONLY (OUTPATIENT)
Dept: PAIN MEDICINE | Facility: CLINIC | Age: 38
End: 2023-09-20
Payer: MEDICAID

## 2023-09-20 NOTE — PROGRESS NOTES
Completed p2p and requesting L4-5 TFESI bilaterally, not an interlaminar epidural steroid injection which was submitted into their system incorrectly. Patient has been active in a home exercise program provided by physical therapy previously in 2/2023 and has not had tremendous relief in her pain performing these weekly.  She also has tried multiple different medications such as Lyrica, NSAIDs, and Zanaflex, with either limited relief for intolerance to these medications.  It is my opinion that this patient would greatly benefit from lumbar MIKE bilaterally targeting the L4-5 level.

## 2023-09-25 NOTE — PRE-PROCEDURE INSTRUCTIONS
Spoke with patient regarding procedure scheduled on 9.28     Arrival time 0645     Has patient been sick with fever or on antibiotics within the last 7 days? No     Does the patient have any open wounds, sores or rashes? No     Does the patient have any recent fractures? no     Has patient received a vaccination within the last 7 days? No     Received the COVID vaccination? yes     Has the patient stopped all medications as directed? na     Does patient have a pacemaker, defibrillator, or implantable stimulator? No     Does the patient have a ride to and from procedure and someone reliable to remain with patient? lianna or yung     Is the patient diabetic? no     Does the patient have sleep apnea? Or use O2 at home? no     Is the patient receiving sedation? yes     Is the patient instructed to remain NPO beginning at midnight the night before their procedure? yes     Procedure location confirmed with patient? Yes     Covid- Denies signs/symptoms. Instructed to notify PAT/MD if any changes.

## 2023-09-28 ENCOUNTER — HOSPITAL ENCOUNTER (OUTPATIENT)
Facility: HOSPITAL | Age: 38
Discharge: HOME | End: 2023-09-28
Attending: PHYSICAL MEDICINE & REHABILITATION | Admitting: PHYSICAL MEDICINE & REHABILITATION
Payer: MEDICAID

## 2023-09-28 VITALS
WEIGHT: 140.13 LBS | SYSTOLIC BLOOD PRESSURE: 98 MMHG | RESPIRATION RATE: 19 BRPM | BODY MASS INDEX: 23.92 KG/M2 | TEMPERATURE: 98 F | HEART RATE: 82 BPM | DIASTOLIC BLOOD PRESSURE: 67 MMHG | OXYGEN SATURATION: 99 % | HEIGHT: 64 IN

## 2023-09-28 DIAGNOSIS — M54.16 LUMBAR RADICULOPATHY, CHRONIC: Primary | ICD-10-CM

## 2023-09-28 DIAGNOSIS — M54.16 LUMBAR RADICULOPATHY: ICD-10-CM

## 2023-09-28 PROCEDURE — 64483 PR EPIDURAL INJ, ANES/STEROID, TRANSFORAMINAL, LUMB/SACR, SNGL LEVL: ICD-10-PCS | Mod: 50,,, | Performed by: PHYSICAL MEDICINE & REHABILITATION

## 2023-09-28 PROCEDURE — 63600175 PHARM REV CODE 636 W HCPCS: Performed by: PHYSICAL MEDICINE & REHABILITATION

## 2023-09-28 PROCEDURE — 64483 NJX AA&/STRD TFRM EPI L/S 1: CPT | Mod: 50 | Performed by: PHYSICAL MEDICINE & REHABILITATION

## 2023-09-28 PROCEDURE — 64483 NJX AA&/STRD TFRM EPI L/S 1: CPT | Mod: 50,,, | Performed by: PHYSICAL MEDICINE & REHABILITATION

## 2023-09-28 PROCEDURE — 25500020 PHARM REV CODE 255: Performed by: PHYSICAL MEDICINE & REHABILITATION

## 2023-09-28 RX ORDER — BUPIVACAINE HYDROCHLORIDE 2.5 MG/ML
INJECTION, SOLUTION EPIDURAL; INFILTRATION; INTRACAUDAL
Status: DISCONTINUED | OUTPATIENT
Start: 2023-09-28 | End: 2023-09-28 | Stop reason: HOSPADM

## 2023-09-28 RX ORDER — FENTANYL CITRATE 50 UG/ML
INJECTION, SOLUTION INTRAMUSCULAR; INTRAVENOUS
Status: DISCONTINUED | OUTPATIENT
Start: 2023-09-28 | End: 2023-09-28 | Stop reason: HOSPADM

## 2023-09-28 RX ORDER — METHYLPREDNISOLONE ACETATE 40 MG/ML
INJECTION, SUSPENSION INTRA-ARTICULAR; INTRALESIONAL; INTRAMUSCULAR; SOFT TISSUE
Status: DISCONTINUED | OUTPATIENT
Start: 2023-09-28 | End: 2023-09-28 | Stop reason: HOSPADM

## 2023-09-28 RX ORDER — ONDANSETRON 2 MG/ML
4 INJECTION INTRAMUSCULAR; INTRAVENOUS ONCE AS NEEDED
Status: DISCONTINUED | OUTPATIENT
Start: 2023-09-28 | End: 2023-09-28 | Stop reason: HOSPADM

## 2023-09-28 RX ORDER — MIDAZOLAM HYDROCHLORIDE 1 MG/ML
INJECTION, SOLUTION INTRAMUSCULAR; INTRAVENOUS
Status: DISCONTINUED | OUTPATIENT
Start: 2023-09-28 | End: 2023-09-28 | Stop reason: HOSPADM

## 2023-09-28 NOTE — DISCHARGE SUMMARY
Discharge Note  Short Stay      SUMMARY     Admit Date: 9/28/2023    Attending Physician: Michele Hernandez MD        Discharge Physician: Michele Hernandez MD        Discharge Date: 9/28/2023 8:58 AM    Procedure(s) (LRB):  Bilateral L4/5 TF MIKE (Bilateral)    Final Diagnosis: Lumbar radiculopathy, chronic [M54.16]    Disposition: Home or self care    Patient Instructions:   Current Discharge Medication List        CONTINUE these medications which have NOT CHANGED    Details   atorvastatin (LIPITOR) 40 MG tablet TAKE 1 TABLET(40 MG) BY MOUTH EVERY DAY  Qty: 90 tablet, Refills: 1    Associated Diagnoses: Hyperlipidemia, unspecified hyperlipidemia type      biotin 1 mg Cap Take by mouth.      gabapentin (NEURONTIN) 400 MG capsule Take 1 capsule (400 mg total) by mouth 3 (three) times daily.  Qty: 90 capsule, Refills: 2    Comments: Dr. Michele Hernandez - TAHMINA# [id number]  Associated Diagnoses: Lumbar radiculopathy, chronic      linaCLOtide (LINZESS) 72 mcg Cap capsule Take 1 capsule (72 mcg total) by mouth before breakfast.  Qty: 90 capsule, Refills: 1    Associated Diagnoses: Therapeutic opioid-induced constipation (OIC)      melatonin 5 mg Cap Take 5 mg by mouth every evening.      multivit with minerals/lutein (MULTIVITAMIN 50 PLUS ORAL) multivitamin      ondansetron (ZOFRAN-ODT) 4 MG TbDL DISSOLVE 1 TABLET UNDER THE TONGUE EVERY 6 HOURS AS NEEDED FOR NAUSEA AND VOMITING  Qty: 90 tablet, Refills: 1      rimegepant (NURTEC) 75 mg odt Take 75 mg by mouth daily as needed.      tiZANidine (ZANAFLEX) 4 MG tablet TAKE 1/2 TO 1 TABLET(2 TO 4 MG) BY MOUTH THREE TIMES DAILY AS NEEDED FOR MUSCLE SPASMS. MAY CAUSE DROWSINESS  Qty: 90 tablet, Refills: 2    Associated Diagnoses: Lumbar muscle pain      topiramate (TOPAMAX) 200 MG Tab Take 200 mg by mouth every morning.      traMADoL (ULTRAM) 50 mg tablet Take 50 mg by mouth every 8 (eight) hours as needed.      acetaminophen 325 mg Cap Tylenol      methylPREDNISolone (MEDROL  DOSEPACK) 4 mg tablet use as directed  Qty: 21 each, Refills: 0    Associated Diagnoses: Acute exacerbation of chronic low back pain      oxyCODONE-acetaminophen (PERCOCET) 5-325 mg per tablet Take 1-2 tablets by mouth.                 Discharge Diagnosis: Lumbar radiculopathy, chronic [M54.16]  Condition on Discharge: Stable with no complications to procedure   Diet on Discharge: Same as before.  Activity: as per instruction sheet.  Discharge to: Home with a responsible adult.  Follow up: 2-4 weeks       Please call the office at [phone] if you experience any weakness or loss of sensation, fever > 101.5, pain uncontrolled with oral medications, persistent nausea/vomiting/or diarrhea, redness or drainage from the incisions, or any other worrisome concerns. If physician on call was not reached or could not communicate with our office for any reason please go to the nearest emergency department

## 2023-09-28 NOTE — OP NOTE
INFORMED CONSENT: The procedure, risks, benefits and options were discussed with patient. There are no contraindications to the procedure. The patient expressed understanding and agreed to proceed. The personnel performing the procedure was discussed.    09/28/2023    Surgeon: Michele Hernandez MD    Assistants: None    SEDATION: Conscious sedation provided by M.D    The patient was monitored with continuous pulse oximetry, EKG, and intermittent blood pressure monitors, immediately prior to administration of sedation.  The patient was hemodynamically stable throughout the entire process was responsive to voice, and breathing spontaneously.  Supplemental O2 was provided at 2L/min via nasal cannula.  Patient was comfortable for the duration of the procedure.     There was a total of 2mg IV Midazolam and 100mcg Fentanyl titrated for the procedure    Total sedation time was >10minutes and <20minutes      PROCEDURE:  Bilateral  L4/5  1) Left  L4/5 TRANSFORAMINAL EPIDURAL STEROID INJECTION  2) Right  L4/5 TRANSFORAMINAL EPIDURAL STEROID INJECTION      Pre Procedure diagnosis:  Bilateral L4/5 Lumbar radiculopathy, chronic [M54.16]    Post-Procedure diagnosis:   same    Complications: None    Specimens: None      DESCRIPTION OF PROCEDURE: The patient was brought to the procedure room. IV access was obtained prior to the procedure. The patient was positioned prone on the fluoroscopy table. Continuous hemodynamic monitoring was initiated including blood pressure, EKG, and pulse oximetry. . The skin was prepped with chlorhexidine and draped in a sterile fashion. Skin anesthesia was achieved using a total of 10mL of lidocaine, 5mL over each respective injection site.     The  L4/5 transforaminal spaces were identified with fluoroscopy in the  AP, oblique, and lateral views.  A 22 gauge spinal quinke needle was then advanced into the area of the trans foraminal spaces bilaterally with confirmation of proper needle position using  AP, oblique, and lateral fluoroscopic views. Once the needle tip was in the area of the transforaminal space, and there was no blood, CSF or paraesthesias,  1.5 mL of Omnipaque 300mg/ml was injected on each side for a total of 3mL.  Fluoroscopic imaging in the AP and lateral views revealed a clear outline of the spinal nerve with proximal spread of agent through the neural foramen into the epidural space. A total combination of 1 mL of Bupivicaine 0.25% and 40 mg depo medrol was injected on each side for a total of 4mL of injected medications with displacement of the contrast dye confirming that the medication went into the area of the transforaminal spaces bilaterally. A sterile dressing was applied.   Patient tolerated the procedure well.    Patient was taken back to the recovery room for further observation.     The patient was discharged to home in stable condition

## 2023-09-28 NOTE — H&P
HPI  Patient presenting for Procedure(s) (LRB):  Bilateral L4/5 TF MIKE (Bilateral)       No health changes since previous encounter    Past Medical History:   Diagnosis Date    Back pain 07/07/2020    Breast mass     Bulging lumbar disc     L4 L5    Encounter for hepatitis C virus screening test for high risk patient 08/04/2020    Epilepsy     Hepatitis C antibody positive in blood     History of alcohol abuse     History of drug abuse     Migraine     Ovarian cyst     Scoliosis     Spine curvature, acquired     Spine disorder     Therapeutic opioid-induced constipation (OIC) 10/15/2020    Wellness examination 09/08/2022     Past Surgical History:   Procedure Laterality Date    EPIDURAL STEROID INJECTION      REPAIR OF HIATAL HERNIA USING MESH      ROBOT-ASSISTED SALPINGECTOMY N/A 12/15/2021    RALH/BS    ROBOTIC ASSISTED HYSTERECTOMY N/A 12/15/2021    RALH/BS- Uterine Fibroids; Pelvic pain; Menorrhagia    SURGICAL REMOVAL OF MASS OF UPPER EXTREMITY      right breast     Review of patient's allergies indicates:   Allergen Reactions    Sulfa (sulfonamide antibiotics) Anaphylaxis, Hives, Itching and Other (See Comments)        No current facility-administered medications on file prior to encounter.     Current Outpatient Medications on File Prior to Encounter   Medication Sig Dispense Refill    atorvastatin (LIPITOR) 40 MG tablet TAKE 1 TABLET(40 MG) BY MOUTH EVERY DAY 90 tablet 1    biotin 1 mg Cap Take by mouth.      gabapentin (NEURONTIN) 400 MG capsule Take 1 capsule (400 mg total) by mouth 3 (three) times daily. 90 capsule 2    linaCLOtide (LINZESS) 72 mcg Cap capsule Take 1 capsule (72 mcg total) by mouth before breakfast. 90 capsule 1    melatonin 5 mg Cap Take 5 mg by mouth every evening.      multivit with minerals/lutein (MULTIVITAMIN 50 PLUS ORAL) multivitamin      ondansetron (ZOFRAN-ODT) 4 MG TbDL DISSOLVE 1 TABLET UNDER THE TONGUE EVERY 6 HOURS AS NEEDED FOR NAUSEA AND VOMITING 90 tablet 1    rimegepant  "(NURTEC) 75 mg odt Take 75 mg by mouth daily as needed.      tiZANidine (ZANAFLEX) 4 MG tablet TAKE 1/2 TO 1 TABLET(2 TO 4 MG) BY MOUTH THREE TIMES DAILY AS NEEDED FOR MUSCLE SPASMS. MAY CAUSE DROWSINESS 90 tablet 2    topiramate (TOPAMAX) 200 MG Tab Take 200 mg by mouth every morning.      traMADoL (ULTRAM) 50 mg tablet Take 50 mg by mouth every 8 (eight) hours as needed.      acetaminophen 325 mg Cap Tylenol      methylPREDNISolone (MEDROL DOSEPACK) 4 mg tablet use as directed 21 each 0    oxyCODONE-acetaminophen (PERCOCET) 5-325 mg per tablet Take 1-2 tablets by mouth.          PMHx, PSHx, Allergies, Medications reviewed in epic    ROS negative except pain complaints in HPI    OBJECTIVE:    BP (!) 147/77 (BP Location: Right arm, Patient Position: Sitting)   Pulse 90   Temp 97.8 °F (36.6 °C) (Temporal)   Resp 16   Ht 5' 4" (1.626 m)   Wt 63.6 kg (140 lb 1.6 oz)   LMP 12/02/2021 (Exact Date)   SpO2 99%   Breastfeeding No   BMI 24.05 kg/m²     PHYSICAL EXAMINATION:    GENERAL: Well appearing, in no acute distress, alert and oriented x3.  PSYCH:  Mood and affect appropriate.  SKIN: Skin color, texture, turgor normal, no rashes or lesions which will impact the procedure.  CV: RRR with palpation of the radial artery.  PULM: No evidence of respiratory difficulty, symmetric chest rise. Clear to auscultation.  NEURO: Cranial nerves grossly intact.    Plan:    Proceed with procedure as planned Procedure(s) (LRB):  Bilateral L4/5 TF MIKE (Bilateral)    Michele Hernandez MD  09/28/2023            "

## 2023-09-28 NOTE — DISCHARGE INSTRUCTIONS

## 2023-10-04 ENCOUNTER — PATIENT MESSAGE (OUTPATIENT)
Dept: PAIN MEDICINE | Facility: CLINIC | Age: 38
End: 2023-10-04
Payer: MEDICAID

## 2023-10-05 ENCOUNTER — PATIENT MESSAGE (OUTPATIENT)
Dept: PAIN MEDICINE | Facility: CLINIC | Age: 38
End: 2023-10-05
Payer: MEDICAID

## 2023-10-05 DIAGNOSIS — M54.16 LUMBAR RADICULOPATHY, CHRONIC: Primary | ICD-10-CM

## 2023-10-05 DIAGNOSIS — M47.816 LUMBAR SPONDYLOSIS: ICD-10-CM

## 2023-10-05 DIAGNOSIS — M79.18 LUMBAR MUSCLE PAIN: ICD-10-CM

## 2023-10-10 ENCOUNTER — CLINICAL SUPPORT (OUTPATIENT)
Dept: REHABILITATION | Facility: HOSPITAL | Age: 38
End: 2023-10-10
Payer: MEDICAID

## 2023-10-10 DIAGNOSIS — Z74.09 DECREASED STRENGTH, ENDURANCE, AND MOBILITY: ICD-10-CM

## 2023-10-10 DIAGNOSIS — M54.16 LUMBAR RADICULOPATHY, CHRONIC: ICD-10-CM

## 2023-10-10 DIAGNOSIS — M79.18 LUMBAR MUSCLE PAIN: ICD-10-CM

## 2023-10-10 DIAGNOSIS — R68.89 DECREASED STRENGTH, ENDURANCE, AND MOBILITY: ICD-10-CM

## 2023-10-10 DIAGNOSIS — R53.1 DECREASED STRENGTH, ENDURANCE, AND MOBILITY: ICD-10-CM

## 2023-10-10 DIAGNOSIS — M47.816 LUMBAR SPONDYLOSIS: ICD-10-CM

## 2023-10-10 DIAGNOSIS — R29.3 POSTURE IMBALANCE: ICD-10-CM

## 2023-10-10 PROCEDURE — 97110 THERAPEUTIC EXERCISES: CPT | Mod: PN | Performed by: PHYSICAL THERAPIST

## 2023-10-10 PROCEDURE — 97162 PT EVAL MOD COMPLEX 30 MIN: CPT | Mod: PN | Performed by: PHYSICAL THERAPIST

## 2023-10-10 NOTE — PLAN OF CARE
[facility]   Physical Therapy Initial Evaluation      Date: 10/10/2023   Name: Simon Reynolds  [facility] Number: [id number]    Therapy Diagnosis:  Posture imbalance, Decreased strength, Decreased range of motion      Physician: Lashawn Morgan PA*     Physician Orders: PT Eval and Treat  Medical Diagnosis from Referral:   Encounter Diagnoses   Name Primary?    Lumbar radiculopathy, chronic     Lumbar muscle pain     Lumbar spondylosis      Evaluation Date: 10/10/2023  Authorization Period Expiration: 10/4/2024  Plan of Care Expiration: 12/10/2023  Progress Note Due: 11/10/2023  Visit # / Visits authorized: 1/1   FOTO: 1/3 (last performed on 10/10/2023)    Precautions: Standard    Time In: 0954  Time Out: 1100  Total Billable Time (timed & untimed codes): 66 minutes    Subjective     Date of onset: 2019    History of current condition - Simon reports she lifted heavy stuff at work for 4 year when she worked at [employer] ([employer]).  Patient report that she lifted around 15,000 pounds a week.  Patient reports that one day she lifted 2 bags of small bolts from a pallet using good body mechanics when she experienced a pop on the left side of her lower back with pain radiating down the left leg and up the left side of her back.  She reports she has been out of work 2 years.   She reports that she recently had a job she loved, but had to lift heavy, and hurt her back again about 4 months ago. Patient reports she had to quit secondary to pain in back.  Patient has positive history of loss of bladder with trying to holding bladder or picking something. She reports that she experiences this a couple times per week, minimal amount.     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   Current Level of Function: Independent with all ADL, IADL, community mobility and functional activities with reports of increased pain and need for increased time and frequent breaks.  Patient reports that she cannot walk her dog or exercise for wellness.    Dominant Extremity:    [x] Right    [] Left    Pts goals: Pt reported goals are to decrease overall pain levels in order to return to prior functional level. She would like to get into the gym to some capacity, skate with kids at [employer], participate in [employer] activities.    Medical History:   Past Medical History:   Diagnosis Date    Back pain 07/07/2020    Breast mass     Bulging lumbar disc     L4 L5    Encounter for hepatitis C virus screening test for high risk patient 08/04/2020    Epilepsy     Hepatitis C antibody positive in blood     History of alcohol abuse     History of drug abuse     Migraine     Ovarian cyst     Scoliosis     Spine curvature, acquired     Spine disorder     Therapeutic opioid-induced constipation (OIC) 10/15/2020    Wellness examination 09/08/2022       Surgical History:   Simon Reynolds  has a past surgical history that includes Surgical removal of mass of upper extremity; Repair of hiatal hernia using mesh; Epidural steroid injection; Robotic assisted hysterectomy (N/A, 12/15/2021); Robot-assisted salpingectomy (N/A, 12/15/2021); and Selective injection of anesthetic agent around lumbar spinal nerve root by transforaminal approach (Bilateral, 9/28/2023).    Medications:   Simon has a current medication list which includes the  following prescription(s): acetaminophen, atorvastatin, biotin, gabapentin, linaclotide, melatonin, methylprednisolone, multivit with minerals/lutein, ondansetron, oxycodone-acetaminophen, nurtec, tizanidine, topiramate, and tramadol.    Allergies:   Review of patient's allergies indicates:   Allergen Reactions    Sulfa (sulfonamide antibiotics) Anaphylaxis, Hives, Itching and Other (See Comments)        Objective        RANGE OF MOTION:   Lumbar ROM Right  (spine) Left   Pain/Dysfunction with Movement Goal   Lumbar Flexion (60º) WFL ---  ---   Lumbar Extension (30º) 10 ---  20   Lumbar Side Bending (25º) 15 10  20   Lumbar Rotation 50% 25%  75%       STRENGTH:   L/E MMT Right  (spine) Left Pain/Dysfunction with Movement Goal   Modified (90/90) Abdominal Strength  Fair ---  Good   Hip Flexion  4+/5 3+/5  4+/5 B   Hip Abduction  4-/5 3+/5  4+/5 B   Knee Extension 5/5 4/5  5/5 B   Knee Flexion 5/5 4/5  5/5 B   Ankle DF 5/5 4+/5  5/5 B   Ankle PF 5/5 4+/5  5/5 B          MUSCLE LENGTH:   Muscle Tested  Right Left  Limitation Goal   Hip Flexors [x] Normal  [] Limited [] Normal  [x] Limited  Normal B   ITB / TFL [x] Normal  [] Limited [] Normal  [x] Limited  Normal B       JOINT MOBILITY:  Not tested today      SENSATION  [x] Intact to Light Touch   [] Impaired:      PALPATION: Muscles: Increased tone and tenderness to palpation of: left quadratus lumborum, piriformis.      POSTURE:  Pt presents with postural abnormalities which include:    [x] Forward Head   [] Increased Lumbar Lordosis   [x] Rounded Shoulder   [] Genu Recurvatum   [x] Increased Thoracic Kyphosis [] Genu Valgus   [] Trunk Deviated    [] Pes Planus   [x] Scapular Winging    [x] Other: Swayback posture, Pelvic obliquity, left anterior ilial rotation        BALANCE:   Right   (seconds) Left  (seconds) Pain/dysfunction Noted Goal   Single Leg Stance 10+ 10+  10+ seconds                Function:     Intake Outcome Measure for FOTO Modified Oswestry Low Back  Pain Disability Questionnaire Survey    Therapist reviewed FOTO scores for Simon on 10/10/2023.   FOTO report - see Media section or FOTO account for episode details    Intake Score: 46%         Treatment     Total Treatment time (time-based codes) separate from Evaluation: (30) minutes     Simon received the treatments listed below:          THERAPEUTIC EXERCISES to develop strength, endurance, ROM, flexibility, posture, and core stabilization for (30) minutes including:    Intervention Performed Today    Myofascial release left quadratus lumborum muscle yes    Alternating hip abduction and adduction isometric in hooklying yes    Transverse abdominis / Zip the Zipper yes    Diaphragmatic breathing yes                          Plan for Next Visit: Progress lumbopelvic stability, initiate hip strengthening, assess mobility of thoracic spine       Patient Education and Home Exercises     Education provided:   PURPOSE: Patient educated on the impairments noted above and the effects of physical therapy intervention to improve overall condition and QOL.   EXERCISE: Patient was educated on all the above exercise prior/during/after for proper posture, positioning, and execution for safe performance with home exercise program.   STRENGTH: Patient educated on the importance of improved core and extremity strength in order to improve alignment of the spine and extremities with static positions and dynamic movement.   POSTURE: Patient educated on postural awareness to reduce stress and maintain optimal alignment of the spine with static positions and dynamic movement   SLEEPING POSITIONS: Patient educated on the use of pillows to aid in neutral alignment of spine and extremities when sleeping in supine or side lying.    Written Home Exercises Provided: yes.  Exercises were reviewed and Simon was able to demonstrate them prior to the end of the session.  Simon demonstrated good  understanding of the education provided. See EMR  under Patient Instructions for exercises provided during therapy sessions.    Assessment     Simon is a 37 y.o. female referred to outpatient Physical Therapy with a medical diagnosis of lumbar radiculopathy, lumbar muscle pain, and lumbar spondylosis. Pt presents with impairments in the following categories: IMPAIRMENTS: ROM, strength, endurance, muscle length, balance, posture, core strength and stability, and functional movement patterns    Pt prognosis is Good  Pt will benefit from skilled outpatient Physical Therapy to address the deficits stated above and in the chart below, provide pt/family education, and to maximize pt's level of independence.     Plan of care discussed with patient: Yes  Pt's spiritual, cultural and educational needs considered and patient is agreeable to the plan of care and goals as stated below:     Anticipated Barriers for therapy: co-morbidities and chronicity of condition    Medical Necessity is demonstrated by the following  History  Co-morbidities and personal factors that may impact the plan of care [] LOW: no personal factors / co-morbidities  [] MODERATE: 1-2 personal factors / co-morbidities  [x] HIGH: 3+ personal factors / co-morbidities    Moderate / High Support Documentation:   Past Medical History:   Diagnosis Date    Back pain 07/07/2020    Breast mass     Bulging lumbar disc     L4 L5    Encounter for hepatitis C virus screening test for high risk patient 08/04/2020    Epilepsy     Hepatitis C antibody positive in blood     History of alcohol abuse     History of drug abuse     Migraine     Ovarian cyst     Scoliosis     Spine curvature, acquired     Spine disorder     Therapeutic opioid-induced constipation (OIC) 10/15/2020    Wellness examination 09/08/2022        Examination  Body Structures and Functions, activity limitations and participation restrictions that may impact the plan of care [] LOW: addressing 1-2 elements  [x] MODERATE: 3+ elements  [] HIGH: 4+ elements  "(please support below)    Moderate / High Support Documentation: See above in "Current Level of Function"      Clinical Presentation [] LOW: stable  [x] MODERATE: Evolving  [] HIGH: Unstable     Decision Making/ Complexity Score: moderate         Short Term Goals:  4 weeks Status  Date Met   PAIN: Pt will report worst pain of 8/10 in order to progress toward max functional ability and improve quality of life. [x] Progressing  [] Met  [] Not Met    FUNCTION: Patient will demonstrate improved function as indicated by a score of greater than or equal to 40 out of 100 on FOTO. [x] Progressing  [] Met  [] Not Met    MOBILITY: Patient will improve AROM to 50% of stated goals, listed in objective measures above, in order to progress towards independence with functional activities.  [x] Progressing  [] Met  [] Not Met    STRENGTH: Patient will improve strength to 50% of stated goals, listed in objective measures above, in order to progress towards independence with functional activities. [x] Progressing  [] Met  [] Not Met    POSTURE: Patient will correct postural deviations in sitting and standing, to decrease pain and promote long term stability.  [x] Progressing  [] Met  [] Not Met    HEP: Patient will demonstrate independence with HEP in order to progress toward functional independence. [x] Progressing  [] Met  [] Not Met      Long Term Goals:  8 weeks Status Date Met   PAIN: Pt will report worst pain of 7/10 in order to progress toward max functional ability and improve quality of life [x] Progressing  [] Met  [] Not Met    FUNCTION: Patient will demonstrate improved function as indicated by a score of greater than or equal to 38 out of 100 on FOTO. [x] Progressing  [] Met  [] Not Met    MOBILITY: Patient will improve AROM to stated goals, listed in objective measures above, in order to return to maximal functional potential and improve quality of life.  [x] Progressing  [] Met  [] Not Met    STRENGTH: Patient will " improve strength to stated goals, listed in objective measures above, in order to improve functional independence and quality of life.  [x] Progressing  [] Met  [] Not Met    Patient will return to normal ADL's, IADL's, community involvement, recreational activities, and work-related activities with less than or equal to 7/10 pain and maximal function.  [x] Progressing  [] Met  [] Not Met      Plan     Plan of care Certification: 10/10/2023 to 12/10/2023.    Outpatient Physical Therapy 2 times weekly for 8 weeks to include any combination of the following interventions: virtual visits, dry needling, modalities, electrical stimulation (IFC, Pre-Mod, Attended with Functional Dry Needling), Cervical/Lumbar Traction, Gait Training, Manual Therapy, Moist Heat/ Ice, Neuromuscular Re-ed, Patient Education, Self Care, Therapeutic Exercise, and Therapeutic Activites     Yocasta Carrera, PT, DPT, PPCES

## 2023-10-16 ENCOUNTER — CLINICAL SUPPORT (OUTPATIENT)
Dept: REHABILITATION | Facility: HOSPITAL | Age: 38
End: 2023-10-16
Payer: MEDICAID

## 2023-10-16 DIAGNOSIS — Z74.09 DECREASED STRENGTH, ENDURANCE, AND MOBILITY: ICD-10-CM

## 2023-10-16 DIAGNOSIS — R29.3 POSTURE IMBALANCE: Primary | ICD-10-CM

## 2023-10-16 DIAGNOSIS — R68.89 DECREASED STRENGTH, ENDURANCE, AND MOBILITY: ICD-10-CM

## 2023-10-16 DIAGNOSIS — R53.1 DECREASED STRENGTH, ENDURANCE, AND MOBILITY: ICD-10-CM

## 2023-10-16 PROCEDURE — 97110 THERAPEUTIC EXERCISES: CPT | Mod: PN | Performed by: PHYSICAL THERAPIST

## 2023-10-16 NOTE — PROGRESS NOTES
Physical Therapy Daily Treatment Note     Name: Simon Enriquez [facility] Number: [id number]    Therapy Diagnosis:   Encounter Diagnoses   Name Primary?    Posture imbalance Yes    Decreased strength, endurance, and mobility      Physician: Lashawn Morgan PA*    Visit Date: 10/16/2023    Physician Orders: PT Eval and Treat  Medical Diagnosis from Referral:        Encounter Diagnoses   Name Primary?    Lumbar radiculopathy, chronic      Lumbar muscle pain      Lumbar spondylosis        Evaluation Date: 10/10/2023  Authorization Period Expiration: 10/4/2024  Plan of Care Expiration: 12/10/2023  Progress Note Due: 11/10/2023  Visit # / Visits authorized: 1/10  (plus evaluation)  FOTO: 1/3 (last performed on 10/10/2023)     Precautions: Standard    Time In: 0834  Time Out: 0930  Total Billable Time: 56 minutes    SUBJECTIVE     Pt reports: severe pain in back today, pain into legs and feet, cramping in feet and calf muscles overnight.  She was compliant with home exercise program.  Response to previous treatment: Temporary relief  Functional change: No Updates/First Return Visit    Pain: 8/10  Location:  back      TREATMENT     All interventions billed as therapeutic exercise as required by Medicaid:    Simon received therapeutic exercises to develop strength, endurance, ROM, flexibility, posture, and core stabilization for 10 minutes including:  Passive quadratus lumborum stretch  Lower trunk rotations  Posterior pelvic tilt    Simon received the following manual therapy techniques: Myofacial release and Soft tissue Mobilization were applied for 30 minutes, including:  Myofascial release to the quadratus lumborum muscles  Soft tissue mobilization to errector spinae muscles  Myofascial release along iliac crest and psoas muscles    Simon participated in neuromuscular re-education activities to improve: Coordination, Proprioception, and Posture for 16 minutes. The following activities were included:  Alternating hip  abduction and adduction isometric  Zip the Zipper, Hooklying, sitting, standing postures  Diaphragmatic breathing      Home Exercises Provided and Patient Education Provided     Education provided:   Patient educated on the impairments noted above and the effects of physical therapy intervention to improve overall condition and QOL.   Patient educated on biomechanical justification for therapeutic exercise and importance of compliance with HEP in order to improve overall impairments and QOL   Patient educated on the importance of improved core and hip strength in order to improve alignment of the spine and lower extremities with static positions and dynamic movement.   Patient educated on the importance of strong core and lower extremity musculature in order to improve both static and dynamic balance, improve gait mechanics, reduce fall risk and improve household and community mobility.     Written Home Exercises Provided: Patient instructed to cont prior HEP.  Exercises were reviewed and Simon was able to demonstrate them prior to the end of the session.  Simon demonstrated good  understanding of the education provided.     See EMR under Patient Instructions for exercises provided prior visit.    ASSESSMENT   Patient presents today with hypertonicity in the quadratus lumborum muscle, which decreased with manual therapy.  Patient had improved coordination of transverse abdominis muscle since initial visit.  Patient tolerated exercise without increase in symptoms.  Plan to continue to focus on improving lumbopelvic strength while decompressing nerves exiting lumbar and sacral plexus.    Simon Is progressing well towards her goals.   Pt prognosis is Good.     Pt will continue to benefit from skilled outpatient physical therapy to address the deficits listed in the problem list box on initial evaluation, provide pt/family education and to maximize pt's level of independence in the home and community environment.     Pt's  spiritual, cultural and educational needs considered and pt agreeable to plan of care and goals.     Anticipated barriers to physical therapy: co-morbidities and chronicity of condition    Goals:   Short Term Goals:  4 weeks Status  Date Met   PAIN: Pt will report worst pain of 8/10 in order to progress toward max functional ability and improve quality of life. [x] Progressing  [] Met  [] Not Met     FUNCTION: Patient will demonstrate improved function as indicated by a score of greater than or equal to 40 out of 100 on FOTO. [x] Progressing  [] Met  [] Not Met     MOBILITY: Patient will improve AROM to 50% of stated goals, listed in objective measures above, in order to progress towards independence with functional activities.  [x] Progressing  [] Met  [] Not Met     STRENGTH: Patient will improve strength to 50% of stated goals, listed in objective measures above, in order to progress towards independence with functional activities. [x] Progressing  [] Met  [] Not Met     POSTURE: Patient will correct postural deviations in sitting and standing, to decrease pain and promote long term stability.  [x] Progressing  [] Met  [] Not Met     HEP: Patient will demonstrate independence with HEP in order to progress toward functional independence. [x] Progressing  [] Met  [] Not Met        Long Term Goals:  8 weeks Status Date Met   PAIN: Pt will report worst pain of 7/10 in order to progress toward max functional ability and improve quality of life [x] Progressing  [] Met  [] Not Met     FUNCTION: Patient will demonstrate improved function as indicated by a score of greater than or equal to 38 out of 100 on FOTO. [x] Progressing  [] Met  [] Not Met     MOBILITY: Patient will improve AROM to stated goals, listed in objective measures above, in order to return to maximal functional potential and improve quality of life.  [x] Progressing  [] Met  [] Not Met     STRENGTH: Patient will improve strength to stated goals, listed  in objective measures above, in order to improve functional independence and quality of life.  [x] Progressing  [] Met  [] Not Met     Patient will return to normal ADL's, IADL's, community involvement, recreational activities, and work-related activities with less than or equal to 7/10 pain and maximal function.  [x] Progressing  [] Met  [] Not Met          PLAN   Continue Plan of Care (POC) and progress per patient tolerance.    Yocasta Carrera, PT, DPT, PPCES

## 2023-10-18 ENCOUNTER — CLINICAL SUPPORT (OUTPATIENT)
Dept: REHABILITATION | Facility: HOSPITAL | Age: 38
End: 2023-10-18
Payer: MEDICAID

## 2023-10-18 DIAGNOSIS — R68.89 DECREASED STRENGTH, ENDURANCE, AND MOBILITY: ICD-10-CM

## 2023-10-18 DIAGNOSIS — R29.3 POSTURE IMBALANCE: Primary | ICD-10-CM

## 2023-10-18 DIAGNOSIS — R53.1 DECREASED STRENGTH, ENDURANCE, AND MOBILITY: ICD-10-CM

## 2023-10-18 DIAGNOSIS — Z74.09 DECREASED STRENGTH, ENDURANCE, AND MOBILITY: ICD-10-CM

## 2023-10-18 PROCEDURE — 97110 THERAPEUTIC EXERCISES: CPT | Mod: PN | Performed by: PHYSICAL THERAPIST

## 2023-10-18 NOTE — PROGRESS NOTES
Physical Therapy Daily Treatment Note     Name: Simon Enriquez [facility] Number: [id number]    Therapy Diagnosis:   Encounter Diagnoses   Name Primary?    Posture imbalance Yes    Decreased strength, endurance, and mobility      Physician: Lashawn Morgan PA*    Visit Date: 10/18/2023    Physician Orders: PT Eval and Treat  Medical Diagnosis from Referral:        Encounter Diagnoses   Name Primary?    Lumbar radiculopathy, chronic      Lumbar muscle pain      Lumbar spondylosis        Evaluation Date: 10/10/2023  Authorization Period Expiration: 10/4/2024  Plan of Care Expiration: 12/10/2023  Progress Note Due: 11/10/2023  Visit # / Visits authorized: 2/10  (plus evaluation)  FOTO: 1/3 (last performed on 10/10/2023)     Precautions: Standard    Time In: 0838  Time Out: 0934  Total Billable Time: 56 minutes    SUBJECTIVE     Pt reports: again severe pain in back today, pain into legs and feet, cramping in feet and calf muscles overnight.  She was compliant with home exercise program.  Response to previous treatment: soreness  Functional change: No Updates    Pain: 8/10  Location:  back      TREATMENT     All interventions billed as therapeutic exercise as required by Medicaid:    Simno received therapeutic exercises to develop strength, endurance, ROM, flexibility, posture, and core stabilization for 15 minutes including:  Tibial nerve flossing  Sural nerve flossing  Sciatic nerve flossing  Prone hip extension  Lower trunk rotations  Thoracic extension over ball while seated in chair    Simon received the following manual therapy techniques: Myofacial release and Soft tissue Mobilization were applied for 25 minutes, including:  Myofascial release to the quadratus lumborum muscles  Soft tissue mobilization to thoracic multifidi  Myofascial release using roller to bilateral neural pathways  Thoracic gentle mobilizations grade 2    Simon participated in neuromuscular re-education activities to improve: Coordination,  Proprioception, and Posture for 16 minutes. The following activities were included:  Alternating hip abduction and adduction isometric  Zip the Zipper, Hooklying, sitting, standing postures  Diaphragmatic breathing  Zip the Zipper with Posterior Pelvic Tilt (PPT)  Ball on wall mini squats      Home Exercises Provided and Patient Education Provided     Education provided:   Patient educated on the impairments noted above and the effects of physical therapy intervention to improve overall condition and QOL.   Patient educated on biomechanical justification for therapeutic exercise and importance of compliance with HEP in order to improve overall impairments and QOL   Patient educated on the importance of improved core and hip strength in order to improve alignment of the spine and lower extremities with static positions and dynamic movement.   Patient educated on the importance of strong core and lower extremity musculature in order to improve both static and dynamic balance, improve gait mechanics, reduce fall risk and improve household and community mobility.     Written Home Exercises Provided: Patient instructed to cont prior HEP.  Exercises were reviewed and Simon was able to demonstrate them prior to the end of the session.  Simon demonstrated good  understanding of the education provided.     See EMR under Patient Instructions for exercises provided prior visit.    ASSESSMENT   Patient presents today with adhesions along the sciatic neural pathway bilaterally, which decreased with manual therapy.  Patient reported transition from numbness to tingling following myofascial release.  Patient tolerated exercise without increase in symptoms.  Plan to continue to focus on improving lumbopelvic strength while decompressing nerves exiting lumbar and sacral plexus.    Simon Is progressing well towards her goals.   Pt prognosis is Good.     Pt will continue to benefit from skilled outpatient physical therapy to address the  deficits listed in the problem list box on initial evaluation, provide pt/family education and to maximize pt's level of independence in the home and community environment.     Pt's spiritual, cultural and educational needs considered and pt agreeable to plan of care and goals.     Anticipated barriers to physical therapy: co-morbidities and chronicity of condition    Goals:   Short Term Goals:  4 weeks Status  Date Met   PAIN: Pt will report worst pain of 8/10 in order to progress toward max functional ability and improve quality of life. [x] Progressing  [] Met  [] Not Met     FUNCTION: Patient will demonstrate improved function as indicated by a score of greater than or equal to 40 out of 100 on FOTO. [x] Progressing  [] Met  [] Not Met     MOBILITY: Patient will improve AROM to 50% of stated goals, listed in objective measures above, in order to progress towards independence with functional activities.  [x] Progressing  [] Met  [] Not Met     STRENGTH: Patient will improve strength to 50% of stated goals, listed in objective measures above, in order to progress towards independence with functional activities. [x] Progressing  [] Met  [] Not Met     POSTURE: Patient will correct postural deviations in sitting and standing, to decrease pain and promote long term stability.  [x] Progressing  [] Met  [] Not Met     HEP: Patient will demonstrate independence with HEP in order to progress toward functional independence. [x] Progressing  [] Met  [] Not Met        Long Term Goals:  8 weeks Status Date Met   PAIN: Pt will report worst pain of 7/10 in order to progress toward max functional ability and improve quality of life [x] Progressing  [] Met  [] Not Met     FUNCTION: Patient will demonstrate improved function as indicated by a score of greater than or equal to 38 out of 100 on FOTO. [x] Progressing  [] Met  [] Not Met     MOBILITY: Patient will improve AROM to stated goals, listed in objective measures above, in  order to return to maximal functional potential and improve quality of life.  [x] Progressing  [] Met  [] Not Met     STRENGTH: Patient will improve strength to stated goals, listed in objective measures above, in order to improve functional independence and quality of life.  [x] Progressing  [] Met  [] Not Met     Patient will return to normal ADL's, IADL's, community involvement, recreational activities, and work-related activities with less than or equal to 7/10 pain and maximal function.  [x] Progressing  [] Met  [] Not Met          PLAN   Continue Plan of Care (POC) and progress per patient tolerance.    Yocasta Carrera, PT, DPT, PPCES

## 2023-10-23 ENCOUNTER — CLINICAL SUPPORT (OUTPATIENT)
Dept: REHABILITATION | Facility: HOSPITAL | Age: 38
End: 2023-10-23
Payer: MEDICAID

## 2023-10-23 DIAGNOSIS — R68.89 DECREASED STRENGTH, ENDURANCE, AND MOBILITY: ICD-10-CM

## 2023-10-23 DIAGNOSIS — Z74.09 DECREASED STRENGTH, ENDURANCE, AND MOBILITY: ICD-10-CM

## 2023-10-23 DIAGNOSIS — R53.1 DECREASED STRENGTH, ENDURANCE, AND MOBILITY: ICD-10-CM

## 2023-10-23 DIAGNOSIS — R29.3 POSTURE IMBALANCE: Primary | ICD-10-CM

## 2023-10-23 PROCEDURE — 97110 THERAPEUTIC EXERCISES: CPT | Mod: PN | Performed by: PHYSICAL THERAPIST

## 2023-10-23 NOTE — PROGRESS NOTES
Physical Therapy Daily Treatment Note     Name: Simon Enriquez [facility] Number: [id number]    Therapy Diagnosis:   Encounter Diagnoses   Name Primary?    Posture imbalance Yes    Decreased strength, endurance, and mobility      Physician: Lashawn Morgan PA*    Visit Date: 10/23/2023    Physician Orders: PT Eval and Treat  Medical Diagnosis from Referral:        Encounter Diagnoses   Name Primary?    Lumbar radiculopathy, chronic      Lumbar muscle pain      Lumbar spondylosis        Evaluation Date: 10/10/2023  Authorization Period Expiration: 10/4/2024  Plan of Care Expiration: 12/10/2023  Progress Note Due: 11/10/2023  Visit # / Visits authorized: 3/10  (plus evaluation)  FOTO: 1/3 (last performed on 10/10/2023)     Precautions: Standard    Time In: 0930  Time Out: 1023  Total Billable Time: 53 minutes    SUBJECTIVE     Pt reports: she was hurting during the night and in the morning.  She was compliant with home exercise program.  Response to previous treatment: relief x 1 day  Functional change: Patient reports decreased frequency of urinary leakage.    Pain: 8/10  Location:  back      TREATMENT     All interventions billed as therapeutic exercise as required by Medicaid:    Simon received therapeutic exercises to develop strength, endurance, ROM, flexibility, posture, and core stabilization for 8 minutes including:    Prone hip extension each side, alternating  Lower trunk rotations both directions  Open book each side  Thoracic extension over ball while seated in chair    The following exercises were deferred:  Tibial nerve flossing  Sural nerve flossing  Sciatic nerve flossing    Simon received the following manual therapy techniques: Myofacial release and Soft tissue Mobilization were applied for 20 minutes, including:  Myofascial release to the quadratus lumborum muscles  Soft tissue mobilization to thoracic and lumbar paraspinals  Skin rolling was applied over bilateral thoracodorsal fascia      Simon  participated in neuromuscular re-education activities to improve: Coordination, Proprioception, and Posture for 25 minutes. The following activities were included:  Alternating hip abduction and adduction isometric 10 x 10 seconds  Zip the Zipper, Hooklying, sitting, standing postures  Diaphragmatic breathing  Core breathing belly pump 10x  Zip the Zipper with Posterior Pelvic Tilt (PPT) 20x  Zip the Zipper, PPT, mini bridge against green theraband 20x  Weighted diagonals red ball 20x each direction  Anti rotation against green theraband 20x each direction  AIDE hip flexion, abduction, and extension against yellow theraband 2 x 10 each side  Ball on wall mini squats      Home Exercises Provided and Patient Education Provided     Education provided:   Patient educated on the impairments noted above and the effects of physical therapy intervention to improve overall condition and QOL.   Patient educated on biomechanical justification for therapeutic exercise and importance of compliance with HEP in order to improve overall impairments and QOL   Patient educated on the importance of improved core and hip strength in order to improve alignment of the spine and lower extremities with static positions and dynamic movement.   Patient educated on the importance of strong core and lower extremity musculature in order to improve both static and dynamic balance, improve gait mechanics, reduce fall risk and improve household and community mobility.     Written Home Exercises Provided: Patient instructed to cont prior HEP.  Exercises were reviewed and Simon was able to demonstrate them prior to the end of the session.  Simon demonstrated good  understanding of the education provided.     See EMR under Patient Instructions for exercises provided prior visit.    ASSESSMENT   Patient presents today with adhesions over the thoracolumbar junction, which improved with manual therapy.  Patient demonstrated improved lumbar rotation post  manual therapy.  Patient tolerated exercise without increase in symptoms.  Plan to continue to focus on improving lumbopelvic strength while decompressing nerves exiting lumbar and sacral plexus.    Simon Is progressing well towards her goals.   Pt prognosis is Good.     Pt will continue to benefit from skilled outpatient physical therapy to address the deficits listed in the problem list box on initial evaluation, provide pt/family education and to maximize pt's level of independence in the home and community environment.     Pt's spiritual, cultural and educational needs considered and pt agreeable to plan of care and goals.     Anticipated barriers to physical therapy: co-morbidities and chronicity of condition    Goals:   Short Term Goals:  4 weeks Status  Date Met   PAIN: Pt will report worst pain of 8/10 in order to progress toward max functional ability and improve quality of life. [x] Progressing  [] Met  [] Not Met     FUNCTION: Patient will demonstrate improved function as indicated by a score of greater than or equal to 40 out of 100 on FOTO. [x] Progressing  [] Met  [] Not Met     MOBILITY: Patient will improve AROM to 50% of stated goals, listed in objective measures above, in order to progress towards independence with functional activities.  [x] Progressing  [] Met  [] Not Met     STRENGTH: Patient will improve strength to 50% of stated goals, listed in objective measures above, in order to progress towards independence with functional activities. [x] Progressing  [] Met  [] Not Met     POSTURE: Patient will correct postural deviations in sitting and standing, to decrease pain and promote long term stability.  [x] Progressing  [] Met  [] Not Met     HEP: Patient will demonstrate independence with HEP in order to progress toward functional independence. [x] Progressing  [] Met  [] Not Met        Long Term Goals:  8 weeks Status Date Met   PAIN: Pt will report worst pain of 7/10 in order to progress  toward max functional ability and improve quality of life [x] Progressing  [] Met  [] Not Met     FUNCTION: Patient will demonstrate improved function as indicated by a score of greater than or equal to 38 out of 100 on FOTO. [x] Progressing  [] Met  [] Not Met     MOBILITY: Patient will improve AROM to stated goals, listed in objective measures above, in order to return to maximal functional potential and improve quality of life.  [x] Progressing  [] Met  [] Not Met     STRENGTH: Patient will improve strength to stated goals, listed in objective measures above, in order to improve functional independence and quality of life.  [x] Progressing  [] Met  [] Not Met     Patient will return to normal ADL's, IADL's, community involvement, recreational activities, and work-related activities with less than or equal to 7/10 pain and maximal function.  [x] Progressing  [] Met  [] Not Met          PLAN   Continue Plan of Care (POC) and progress per patient tolerance.    Yocasta Carrera, PT, DPT, PPCES

## 2023-10-25 ENCOUNTER — CLINICAL SUPPORT (OUTPATIENT)
Dept: REHABILITATION | Facility: HOSPITAL | Age: 38
End: 2023-10-25
Payer: MEDICAID

## 2023-10-25 DIAGNOSIS — Z74.09 DECREASED STRENGTH, ENDURANCE, AND MOBILITY: ICD-10-CM

## 2023-10-25 DIAGNOSIS — R29.3 POSTURE IMBALANCE: Primary | ICD-10-CM

## 2023-10-25 DIAGNOSIS — R53.1 DECREASED STRENGTH, ENDURANCE, AND MOBILITY: ICD-10-CM

## 2023-10-25 DIAGNOSIS — R68.89 DECREASED STRENGTH, ENDURANCE, AND MOBILITY: ICD-10-CM

## 2023-10-25 PROCEDURE — 97110 THERAPEUTIC EXERCISES: CPT | Mod: PN | Performed by: PHYSICAL THERAPIST

## 2023-10-28 NOTE — PROGRESS NOTES
Physical Therapy Daily Treatment Note     Name: Simon Enriquez [facility] Number: [id number]    Therapy Diagnosis:   Encounter Diagnoses   Name Primary?    Posture imbalance Yes    Decreased strength, endurance, and mobility      Physician: Lashawn Morgan PA*    Visit Date: 10/25/2023    Physician Orders: PT Eval and Treat  Medical Diagnosis from Referral:        Encounter Diagnoses   Name Primary?    Lumbar radiculopathy, chronic      Lumbar muscle pain      Lumbar spondylosis        Evaluation Date: 10/10/2023  Authorization Period Expiration: 10/4/2024  Plan of Care Expiration: 12/10/2023  Progress Note Due: 11/10/2023  Visit # / Visits authorized: 4/10  (plus evaluation)  FOTO: 1/3 (last performed on 10/10/2023)     Precautions: Standard    Time In: 0838  Time Out: 0931  Total Billable Time: 53 minutes    SUBJECTIVE     Pt reports: she continues to experience pain when she wakes in the morning.  Patient reports she spent a lot of time under a bike while detailing it since last visit.  She was compliant with home exercise program.  Response to previous treatment: relief when she leaves therapy, short-lasting, pain returns following day.  Functional change: Patient reports decreased frequency of urinary leakage.    Pain: 8/10  Location:  back      TREATMENT     All interventions billed as therapeutic exercise as required by Medicaid:    Simon received therapeutic exercises to develop strength, endurance, ROM, flexibility, posture, and core stabilization for 8 minutes including:    Prone hip extension each side, alternating  Lower trunk rotations both directions  Thoracic extension over ball while seated in chair    The following exercises were deferred:  Tibial nerve flossing  Sural nerve flossing  Sciatic nerve flossing  Open book each side    Simon received the following manual therapy techniques: Myofacial release and Soft tissue Mobilization were applied for 20 minutes, including:  Myofascial release to  the quadratus lumborum muscles  Soft tissue mobilization to thoracic and lumbar paraspinals  Skin rolling was applied over bilateral thoracodorsal fascia      Simon participated in neuromuscular re-education activities to improve: Coordination, Proprioception, and Posture for 25 minutes. The following activities were included:  Alternating hip abduction and adduction isometric 10 x 10 seconds  Zip the Zipper, Hooklying, sitting, standing postures  Diaphragmatic breathing  Core breathing belly pump 10x  Zip the Zipper with Posterior Pelvic Tilt (PPT) 20x  Zip the Zipper, PPT, mini bridge against green theraband 20x  Prone T and I, no resistance 20x  Prone row, no resistance 20x  Weighted diagonals red ball 20x each direction  Anti rotation against green theraband 20x each direction  AIDE hip flexion, abduction, and extension against yellow theraband 2 x 10 each side  Ball on wall mini squats      Home Exercises Provided and Patient Education Provided     Education provided:   Patient educated on the impairments noted above and the effects of physical therapy intervention to improve overall condition and QOL.   Patient educated on biomechanical justification for therapeutic exercise and importance of compliance with HEP in order to improve overall impairments and QOL   Patient educated on the importance of improved core and hip strength in order to improve alignment of the spine and lower extremities with static positions and dynamic movement.   Patient educated on the importance of strong core and lower extremity musculature in order to improve both static and dynamic balance, improve gait mechanics, reduce fall risk and improve household and community mobility.     Written Home Exercises Provided: Patient instructed to cont prior HEP.  Exercises were reviewed and Simon was able to demonstrate them prior to the end of the session.  Simon demonstrated good  understanding of the education provided.     See EMR under  Patient Instructions for exercises provided prior visit.    ASSESSMENT   Patient presents today with hypertonicity throughout quadratus lumborum and upper trapezius, causing dysfunctional posturing, all of which improved with myofascial release.  Postural stability exercises added today for functional carryover.  Patient tolerated exercise without increase in symptoms.  Plan to continue to focus on improving lumbopelvic and postural strength while decompressing nerves exiting lumbar and sacral plexus.    Simon Is progressing well towards her goals.   Pt prognosis is Good.     Pt will continue to benefit from skilled outpatient physical therapy to address the deficits listed in the problem list box on initial evaluation, provide pt/family education and to maximize pt's level of independence in the home and community environment.     Pt's spiritual, cultural and educational needs considered and pt agreeable to plan of care and goals.     Anticipated barriers to physical therapy: co-morbidities and chronicity of condition    Goals:   Short Term Goals:  4 weeks Status  Date Met   PAIN: Pt will report worst pain of 8/10 in order to progress toward max functional ability and improve quality of life. [x] Progressing  [] Met  [] Not Met     FUNCTION: Patient will demonstrate improved function as indicated by a score of greater than or equal to 40 out of 100 on FOTO. [x] Progressing  [] Met  [] Not Met     MOBILITY: Patient will improve AROM to 50% of stated goals, listed in objective measures above, in order to progress towards independence with functional activities.  [x] Progressing  [] Met  [] Not Met     STRENGTH: Patient will improve strength to 50% of stated goals, listed in objective measures above, in order to progress towards independence with functional activities. [x] Progressing  [] Met  [] Not Met     POSTURE: Patient will correct postural deviations in sitting and standing, to decrease pain and promote long  term stability.  [x] Progressing  [] Met  [] Not Met     HEP: Patient will demonstrate independence with HEP in order to progress toward functional independence. [x] Progressing  [] Met  [] Not Met        Long Term Goals:  8 weeks Status Date Met   PAIN: Pt will report worst pain of 7/10 in order to progress toward max functional ability and improve quality of life [x] Progressing  [] Met  [] Not Met     FUNCTION: Patient will demonstrate improved function as indicated by a score of greater than or equal to 38 out of 100 on FOTO. [x] Progressing  [] Met  [] Not Met     MOBILITY: Patient will improve AROM to stated goals, listed in objective measures above, in order to return to maximal functional potential and improve quality of life.  [x] Progressing  [] Met  [] Not Met     STRENGTH: Patient will improve strength to stated goals, listed in objective measures above, in order to improve functional independence and quality of life.  [x] Progressing  [] Met  [] Not Met     Patient will return to normal ADL's, IADL's, community involvement, recreational activities, and work-related activities with less than or equal to 7/10 pain and maximal function.  [x] Progressing  [] Met  [] Not Met          PLAN   Continue Plan of Care (POC) and progress per patient tolerance.    Yocasta Carrera, PT, DPT, PPCES

## 2023-10-30 ENCOUNTER — CLINICAL SUPPORT (OUTPATIENT)
Dept: REHABILITATION | Facility: HOSPITAL | Age: 38
End: 2023-10-30
Payer: MEDICAID

## 2023-10-30 DIAGNOSIS — R53.1 DECREASED STRENGTH, ENDURANCE, AND MOBILITY: ICD-10-CM

## 2023-10-30 DIAGNOSIS — R68.89 DECREASED STRENGTH, ENDURANCE, AND MOBILITY: ICD-10-CM

## 2023-10-30 DIAGNOSIS — R29.3 POSTURE IMBALANCE: Primary | ICD-10-CM

## 2023-10-30 DIAGNOSIS — Z74.09 DECREASED STRENGTH, ENDURANCE, AND MOBILITY: ICD-10-CM

## 2023-10-30 PROCEDURE — 97110 THERAPEUTIC EXERCISES: CPT | Mod: PN | Performed by: PHYSICAL THERAPIST

## 2023-10-30 NOTE — PROGRESS NOTES
Physical Therapy Daily Treatment Note     Name: Simon Enriquez [facility] Number: [id number]    Therapy Diagnosis:   Encounter Diagnoses   Name Primary?    Posture imbalance Yes    Decreased strength, endurance, and mobility      Physician: Lashawn Morgan PA*    Visit Date: 10/30/2023    Physician Orders: PT Eval and Treat  Medical Diagnosis from Referral:        Encounter Diagnoses   Name Primary?    Lumbar radiculopathy, chronic      Lumbar muscle pain      Lumbar spondylosis        Evaluation Date: 10/10/2023  Authorization Period Expiration: 10/4/2024  Plan of Care Expiration: 12/10/2023  Progress Note Due: 11/10/2023  Visit # / Visits authorized: 5/10  (plus evaluation)  FOTO: 1/3 (last performed on 10/10/2023)     Precautions: Standard    Time In: 0838  Time Out: 0931  Total Billable Time: 53 minutes    SUBJECTIVE     Pt reports: she continues to experience pain when she wakes in the morning.  Patient reports she had headaches and neck pain over the weekend.  She was compliant with home exercise program.  Response to previous treatment: relief when she leaves therapy, short-lasting, pain returns following day.  Functional change: Patient reports decreased frequency of urinary leakage.    Pain: 8/10  Location:  back      TREATMENT     All interventions billed as therapeutic exercise as required by Medicaid:    Simon received therapeutic exercises to develop strength, endurance, ROM, flexibility, posture, and core stabilization for 53 minutes including the following exercises, manual therapy techniques, and neuromuscular re-education activities:    Pectoralis stretch over table in supine x 1 minute  Thoracic extension over ball while seated in chair 20x    The following exercises were deferred:  Tibial nerve flossing  Sural nerve flossing  Sciatic nerve flossing  Open book each side  Prone hip extension each side, alternating  Lower trunk rotations both directions    Simon received the following manual  therapy techniques: Myofacial release and Soft tissue Mobilization were applied for 20 minutes, including:  Myofascial release to bilateral upper trapezius muscles and left cervical paraspinals  Cervical multifidus isometric contraction against manual resistance 2 x 6 x 4 second holds      Simon participated in neuromuscular re-education activities to improve: Coordination, Proprioception, and Posture for 25 minutes. The following activities were included:  Alternating hip abduction and adduction isometric 10 x 10 seconds  Zip the Zipper, Hooklying, sitting, standing postures  Diaphragmatic breathing  Core breathing belly pump 10x  Zip the Zipper with Posterior Pelvic Tilt (PPT) 20x  Prone T and I, no resistance 20x  Prone row, no resistance 20x  Weighted diagonals red ball 20x each direction  Kimberly external rotation against green theraband 2 x 10  Anti rotation against green theraband 20x each direction      The following exercises were deferred today:  Zip the Zipper, PPT, mini bridge against green theraband 20x  AIDE hip flexion, abduction, and extension against yellow theraband 2 x 10 each side  Ball on wall mini squats to fatigue    Home Exercises Provided and Patient Education Provided     Education provided:   Patient educated on the impairments noted above and the effects of physical therapy intervention to improve overall condition and QOL.   Patient educated on biomechanical justification for therapeutic exercise and importance of compliance with HEP in order to improve overall impairments and QOL   Patient educated on the importance of improved core and hip strength in order to improve alignment of the spine and lower extremities with static positions and dynamic movement.   Patient educated on the importance of strong core and lower extremity musculature in order to improve both static and dynamic balance, improve gait mechanics, reduce fall risk and improve household and community mobility.     Written  Home Exercises Provided: Patient instructed to cont prior HEP.  Exercises were reviewed and Simon was able to demonstrate them prior to the end of the session.  Simon demonstrated good  understanding of the education provided.     See EMR under Patient Instructions for exercises provided prior visit.    ASSESSMENT   Patient presents today with hypertonicity throughout the upper trapezius muscles, causing dysfunctional posturing, all of which improved with myofascial release.  Continued focus on postural stability exercises for functional carryover.  Patient tolerated exercise without increase in symptoms.  Plan to continue to focus on improving lumbopelvic and postural strength while decompressing nerves exiting lumbar and sacral plexus.    Simon Is progressing well towards her goals.   Pt prognosis is Good.     Pt will continue to benefit from skilled outpatient physical therapy to address the deficits listed in the problem list box on initial evaluation, provide pt/family education and to maximize pt's level of independence in the home and community environment.     Pt's spiritual, cultural and educational needs considered and pt agreeable to plan of care and goals.     Anticipated barriers to physical therapy: co-morbidities and chronicity of condition    Goals:   Short Term Goals:  4 weeks Status  Date Met   PAIN: Pt will report worst pain of 8/10 in order to progress toward max functional ability and improve quality of life. [x] Progressing  [] Met  [] Not Met     FUNCTION: Patient will demonstrate improved function as indicated by a score of greater than or equal to 40 out of 100 on FOTO. [x] Progressing  [] Met  [] Not Met     MOBILITY: Patient will improve AROM to 50% of stated goals, listed in objective measures above, in order to progress towards independence with functional activities.  [x] Progressing  [] Met  [] Not Met     STRENGTH: Patient will improve strength to 50% of stated goals, listed in objective  measures above, in order to progress towards independence with functional activities. [x] Progressing  [] Met  [] Not Met     POSTURE: Patient will correct postural deviations in sitting and standing, to decrease pain and promote long term stability.  [x] Progressing  [] Met  [] Not Met     HEP: Patient will demonstrate independence with HEP in order to progress toward functional independence. [x] Progressing  [] Met  [] Not Met        Long Term Goals:  8 weeks Status Date Met   PAIN: Pt will report worst pain of 7/10 in order to progress toward max functional ability and improve quality of life [x] Progressing  [] Met  [] Not Met     FUNCTION: Patient will demonstrate improved function as indicated by a score of greater than or equal to 38 out of 100 on FOTO. [x] Progressing  [] Met  [] Not Met     MOBILITY: Patient will improve AROM to stated goals, listed in objective measures above, in order to return to maximal functional potential and improve quality of life.  [x] Progressing  [] Met  [] Not Met     STRENGTH: Patient will improve strength to stated goals, listed in objective measures above, in order to improve functional independence and quality of life.  [x] Progressing  [] Met  [] Not Met     Patient will return to normal ADL's, IADL's, community involvement, recreational activities, and work-related activities with less than or equal to 7/10 pain and maximal function.  [x] Progressing  [] Met  [] Not Met          PLAN   Continue Plan of Care (POC) and progress per patient tolerance.    Yocasta Carrera, PT, DPT, PPCES

## 2023-11-01 ENCOUNTER — CLINICAL SUPPORT (OUTPATIENT)
Dept: REHABILITATION | Facility: HOSPITAL | Age: 38
End: 2023-11-01
Payer: MEDICAID

## 2023-11-01 DIAGNOSIS — R68.89 DECREASED STRENGTH, ENDURANCE, AND MOBILITY: ICD-10-CM

## 2023-11-01 DIAGNOSIS — Z74.09 DECREASED STRENGTH, ENDURANCE, AND MOBILITY: ICD-10-CM

## 2023-11-01 DIAGNOSIS — R29.3 POSTURE IMBALANCE: Primary | ICD-10-CM

## 2023-11-01 DIAGNOSIS — R53.1 DECREASED STRENGTH, ENDURANCE, AND MOBILITY: ICD-10-CM

## 2023-11-01 PROCEDURE — 97110 THERAPEUTIC EXERCISES: CPT | Mod: PN

## 2023-11-01 NOTE — PROGRESS NOTES
"Physical Therapy Daily Treatment Note     Name: Simon Enriquez [facility] Number: [id number]    Therapy Diagnosis:   Encounter Diagnoses   Name Primary?    Posture imbalance Yes    Decreased strength, endurance, and mobility      Physician: Lashawn Morgan PA*    Visit Date: 11/1/2023    Physician Orders: PT Eval and Treat  Medical Diagnosis from Referral:        Encounter Diagnoses   Name Primary?    Lumbar radiculopathy, chronic      Lumbar muscle pain      Lumbar spondylosis        Evaluation Date: 10/10/2023  Authorization Period Expiration: 10/4/2024  Plan of Care Expiration: 12/10/2023  Progress Note Due: 11/10/2023  Visit # / Visits authorized: 6/10  (plus evaluation)  FOTO: 1/3 (last performed on 10/10/2023)     Precautions: Standard    Time In: 8:35 AM  Time Out: 9:35 AM  Total Billable Time: 60 minutes    SUBJECTIVE     Pt reports: left sided nusrat-scapualr pain and hip/glute pain. She reports that she wa detailing a bike and thinks that is what aggravated her pain.     She was compliant with home exercise program.  Response to previous treatment: relief when she leaves therapy, short-lasting, pain returns following day.  Functional change: Patient reports decreased frequency of urinary leakage.    Pain: 8/10  Location:  back      TREATMENT     All interventions billed as therapeutic exercise as required by Medicaid:    Simon received therapeutic exercises to develop strength, endurance, ROM, flexibility, posture, and core stabilization for 30 minutes including the following exercises, manual therapy techniques, and neuromuscular re-education activities:  UBE for increased UE ROM, strength, and endurance 3 min x 3 min   Seated Thoracic extension 2 x 10  Rhomboid stretch 5 x 15"  (B) ER 2 x 10 RTB   Rows 2 x 10 RTB   F4 LTR x 15 ea  (B) piriformis stretch 2 x 30"  (B) hip flexor stretch 2 x 30"    The following exercises were deferred:  Pectoralis stretch over table in supine x 1 minute  Thoracic extension " over ball while seated in chair 20x  Tibial nerve flossing  Sural nerve flossing  Sciatic nerve flossing  Open book each side  Prone hip extension each side, alternating  Lower trunk rotations both directions    Simon received the following manual therapy techniques: Myofacial release and Soft tissue Mobilization were applied for 30 minutes, including:  Stm/cupping to (L) periscapualr musculature   ART to (L) piriformis   Palpation Assessment to determine the necessity for Functional Dry Needling of Left paraspinals, upper trapezius, levator scapulae , periscapular musculature, glutes/piriformis with/without electrical stimulation.   See EMR under MEDIA for written consent provided 11/1/2023.      Myofascial release to bilateral upper trapezius muscles and left cervical paraspinals  Cervical multifidus isometric contraction against manual resistance 2 x 6 x 4 second holds    Simon participated in neuromuscular re-education activities to improve: Coordination, Proprioception, and Posture for 25 minutes. The following activities were included:  Alternating hip abduction and adduction isometric 10 x 10 seconds  Zip the Zipper, Hooklying, sitting, standing postures  Diaphragmatic breathing  Core breathing belly pump 10x  Zip the Zipper with Posterior Pelvic Tilt (PPT) 20x  Prone T and I, no resistance 20x  Prone row, no resistance 20x  Weighted diagonals red ball 20x each direction  Kimberly external rotation against green theraband 2 x 10  Anti rotation against green theraband 20x each direction      The following exercises were deferred today:  Zip the Zipper, PPT, mini bridge against green theraband 20x  AIDE hip flexion, abduction, and extension against yellow theraband 2 x 10 each side  Ball on wall mini squats to fatigue    Home Exercises Provided and Patient Education Provided     Education provided:   Patient educated on the impairments noted above and the effects of physical therapy intervention to improve overall  condition and QOL.   Patient educated on biomechanical justification for therapeutic exercise and importance of compliance with HEP in order to improve overall impairments and QOL   Patient educated on the importance of improved core and hip strength in order to improve alignment of the spine and lower extremities with static positions and dynamic movement.   Patient educated on the importance of strong core and lower extremity musculature in order to improve both static and dynamic balance, improve gait mechanics, reduce fall risk and improve household and community mobility.     Written Home Exercises Provided: Patient instructed to cont prior HEP.  Exercises were reviewed and Simon was able to demonstrate them prior to the end of the session.  Simon demonstrated good  understanding of the education provided.     See EMR under Patient Instructions for exercises provided prior visit.    ASSESSMENT   Patient tolerated therapy session good today. She presents with left sided pain today. Focused on postural stability, thoracic mobility, and gentle lumbar mobility with good tolerance. Following both verbal and written consent, functional dry needling was performed with good overall tolerance. Following therapy session, pt reports decreased overall tension and pain of her left side. Education on overall postural awareness was discussed. Emphasis on holding decreased static positions for improved posture and decreased tension. Plan to continue to focus on improving lumbopelvic and postural strength while decompressing nerves exiting lumbar and sacral plexus.    Simon Is progressing well towards her goals.   Pt prognosis is Good.     Pt will continue to benefit from skilled outpatient physical therapy to address the deficits listed in the problem list box on initial evaluation, provide pt/family education and to maximize pt's level of independence in the home and community environment.     Pt's spiritual, cultural and  educational needs considered and pt agreeable to plan of care and goals.     Anticipated barriers to physical therapy: co-morbidities and chronicity of condition    Goals:   Short Term Goals:  4 weeks Status  Date Met   PAIN: Pt will report worst pain of 8/10 in order to progress toward max functional ability and improve quality of life. [x] Progressing  [] Met  [] Not Met     FUNCTION: Patient will demonstrate improved function as indicated by a score of greater than or equal to 40 out of 100 on FOTO. [x] Progressing  [] Met  [] Not Met     MOBILITY: Patient will improve AROM to 50% of stated goals, listed in objective measures above, in order to progress towards independence with functional activities.  [x] Progressing  [] Met  [] Not Met     STRENGTH: Patient will improve strength to 50% of stated goals, listed in objective measures above, in order to progress towards independence with functional activities. [x] Progressing  [] Met  [] Not Met     POSTURE: Patient will correct postural deviations in sitting and standing, to decrease pain and promote long term stability.  [x] Progressing  [] Met  [] Not Met     HEP: Patient will demonstrate independence with HEP in order to progress toward functional independence. [x] Progressing  [] Met  [] Not Met        Long Term Goals:  8 weeks Status Date Met   PAIN: Pt will report worst pain of 7/10 in order to progress toward max functional ability and improve quality of life [x] Progressing  [] Met  [] Not Met     FUNCTION: Patient will demonstrate improved function as indicated by a score of greater than or equal to 38 out of 100 on FOTO. [x] Progressing  [] Met  [] Not Met     MOBILITY: Patient will improve AROM to stated goals, listed in objective measures above, in order to return to maximal functional potential and improve quality of life.  [x] Progressing  [] Met  [] Not Met     STRENGTH: Patient will improve strength to stated goals, listed in objective measures  above, in order to improve functional independence and quality of life.  [x] Progressing  [] Met  [] Not Met     Patient will return to normal ADL's, IADL's, community involvement, recreational activities, and work-related activities with less than or equal to 7/10 pain and maximal function.  [x] Progressing  [] Met  [] Not Met          PLAN   Continue Plan of Care (POC) and progress per patient tolerance.    Angy Pyle, PT, DPT  11/1/2023

## 2023-11-06 ENCOUNTER — CLINICAL SUPPORT (OUTPATIENT)
Dept: REHABILITATION | Facility: HOSPITAL | Age: 38
End: 2023-11-06
Payer: MEDICAID

## 2023-11-06 DIAGNOSIS — R53.1 DECREASED STRENGTH, ENDURANCE, AND MOBILITY: ICD-10-CM

## 2023-11-06 DIAGNOSIS — R68.89 DECREASED STRENGTH, ENDURANCE, AND MOBILITY: ICD-10-CM

## 2023-11-06 DIAGNOSIS — Z74.09 DECREASED STRENGTH, ENDURANCE, AND MOBILITY: ICD-10-CM

## 2023-11-06 DIAGNOSIS — R29.3 POSTURE IMBALANCE: Primary | ICD-10-CM

## 2023-11-06 PROCEDURE — 97110 THERAPEUTIC EXERCISES: CPT | Mod: PN | Performed by: PHYSICAL THERAPIST

## 2023-11-06 NOTE — PROGRESS NOTES
"Physical Therapy Daily Treatment Note     Name: Simon Enriquez [facility] Number: [id number]    Therapy Diagnosis:   Encounter Diagnoses   Name Primary?    Posture imbalance Yes    Decreased strength, endurance, and mobility      Physician: Lashawn Morgan PA*    Visit Date: 11/6/2023    Physician Orders: PT Eval and Treat  Medical Diagnosis from Referral:        Encounter Diagnoses   Name Primary?    Lumbar radiculopathy, chronic      Lumbar muscle pain      Lumbar spondylosis        Evaluation Date: 10/10/2023  Authorization Period Expiration: 10/4/2024  Plan of Care Expiration: 12/10/2023  Progress Note Due: 11/10/2023  Visit # / Visits authorized: 7/10  (plus evaluation)  FOTO: 1/3 (last performed on 10/10/2023)     Precautions: Standard    Time In: 8:38 AM  Time Out: 9:39 AM  Total Billable Time: 61 minutes    SUBJECTIVE     Pt reports: she was very sore following dry needling, but feels like it did help her lower back.    She was compliant with home exercise program.  Response to previous treatment: relief when she leaves therapy, short-lasting, pain returns following day.  Functional change: Patient reports decreased frequency of urinary leakage.    Pain: 8/10  Location:  back      TREATMENT     All interventions billed as therapeutic exercise as required by Medicaid:    Simon received therapeutic exercises to develop strength, endurance, ROM, flexibility, posture, and core stabilization for 61 minutes including the following exercises, manual therapy techniques, and neuromuscular re-education activities:  Pectoralis stretch over table in supine x 1 minute  (Bilateral) External rotation 2 x 10 against Green theraband   Rows 2 x 10 against green theraband  Lower trunk rotations (LTR) 10x each direction    F4 LTR x 15 ea  (B) piriformis stretch 2 x 30"  (B) hip flexor stretch 2 x 30"    The following exercises were deferred:  UBE for increased UE ROM, strength, and endurance 3 min x 3 min   Seated Thoracic " "extension 2 x 10  Rhomboid stretch 5 x 15"  Thoracic extension over ball while seated in chair 20x  Tibial nerve flossing  Sural nerve flossing  Sciatic nerve flossing  Open book each side  Prone hip extension each side, alternating  Lower trunk rotations both directions    Simon received the following manual therapy techniques: Myofacial release and Soft tissue Mobilization were applied, including:  Myofascial release to bilateral piriformis muscles and right quadratus lumborum  Gentle long axis distraction applied to each side in sidelying  Myofascial release to bilateral upper trapezius muscles and left cervical paraspinals      Simon participated in neuromuscular re-education activities to improve: Coordination, Proprioception, and Posture. The following activities were included:  Alternating hip abduction and adduction isometric 10 x 10 seconds  Zip the Zipper, Hooklying, sitting, standing postures  Diaphragmatic breathing  Core breathing belly pump 10x  Zip the Zipper with Posterior Pelvic Tilt (PPT) 20x  Kimberly external rotation against green theraband 2 x 10  Bilateral row against red theraband 2 x 10  Bilateral shoulder  Anti rotation against green theraband 20x each direction      The following exercises were deferred today:  Prone T and I, no resistance 20x  Prone row, no resistance 20x  Weighted diagonals red ball 20x each direction  Zip the Zipper, PPT, mini bridge against green theraband 20x  AIDE hip flexion, abduction, and extension against yellow theraband 2 x 10 each side  Ball on wall mini squats to fatigue    Home Exercises Provided and Patient Education Provided     Education provided:   Patient educated on the impairments noted above and the effects of physical therapy intervention to improve overall condition and QOL.   Patient educated on biomechanical justification for therapeutic exercise and importance of compliance with HEP in order to improve overall impairments and QOL   Patient educated " on the importance of improved core and hip strength in order to improve alignment of the spine and lower extremities with static positions and dynamic movement.   Patient educated on the importance of strong core and lower extremity musculature in order to improve both static and dynamic balance, improve gait mechanics, reduce fall risk and improve household and community mobility.     Written Home Exercises Provided: Patient instructed to cont prior HEP.  Exercises were reviewed and Simon was able to demonstrate them prior to the end of the session.  Simon demonstrated good  understanding of the education provided.     See EMR under Patient Instructions for exercises provided prior visit.    ASSESSMENT   Patient presents with tension throughout the left cervical and lumbar musculature, which reduced with manual therapy.  Continued focus on improving lumbopelvic and postural strength while decompressing nerves.  Patient tolerated session well and with appropriate fatigue noted.    Simon Is progressing well towards her goals.   Pt prognosis is Good.     Pt will continue to benefit from skilled outpatient physical therapy to address the deficits listed in the problem list box on initial evaluation, provide pt/family education and to maximize pt's level of independence in the home and community environment.     Pt's spiritual, cultural and educational needs considered and pt agreeable to plan of care and goals.     Anticipated barriers to physical therapy: co-morbidities and chronicity of condition    Goals:   Short Term Goals:  4 weeks Status  Date Met   PAIN: Pt will report worst pain of 8/10 in order to progress toward max functional ability and improve quality of life. [x] Progressing  [] Met  [] Not Met     FUNCTION: Patient will demonstrate improved function as indicated by a score of greater than or equal to 40 out of 100 on FOTO. [x] Progressing  [] Met  [] Not Met     MOBILITY: Patient will improve AROM to 50% of  stated goals, listed in objective measures above, in order to progress towards independence with functional activities.  [x] Progressing  [] Met  [] Not Met     STRENGTH: Patient will improve strength to 50% of stated goals, listed in objective measures above, in order to progress towards independence with functional activities. [x] Progressing  [] Met  [] Not Met     POSTURE: Patient will correct postural deviations in sitting and standing, to decrease pain and promote long term stability.  [x] Progressing  [] Met  [] Not Met     HEP: Patient will demonstrate independence with HEP in order to progress toward functional independence. [x] Progressing  [] Met  [] Not Met        Long Term Goals:  8 weeks Status Date Met   PAIN: Pt will report worst pain of 7/10 in order to progress toward max functional ability and improve quality of life [x] Progressing  [] Met  [] Not Met     FUNCTION: Patient will demonstrate improved function as indicated by a score of greater than or equal to 38 out of 100 on FOTO. [x] Progressing  [] Met  [] Not Met     MOBILITY: Patient will improve AROM to stated goals, listed in objective measures above, in order to return to maximal functional potential and improve quality of life.  [x] Progressing  [] Met  [] Not Met     STRENGTH: Patient will improve strength to stated goals, listed in objective measures above, in order to improve functional independence and quality of life.  [x] Progressing  [] Met  [] Not Met     Patient will return to normal ADL's, IADL's, community involvement, recreational activities, and work-related activities with less than or equal to 7/10 pain and maximal function.  [x] Progressing  [] Met  [] Not Met          PLAN   Continue Plan of Care (POC) and progress per patient tolerance.    Yocasta Carrera, PT, DPT, PPCES  11/6/2023

## 2023-11-08 ENCOUNTER — CLINICAL SUPPORT (OUTPATIENT)
Dept: REHABILITATION | Facility: HOSPITAL | Age: 38
End: 2023-11-08
Payer: MEDICAID

## 2023-11-08 DIAGNOSIS — R29.3 POSTURE IMBALANCE: Primary | ICD-10-CM

## 2023-11-08 DIAGNOSIS — Z74.09 DECREASED STRENGTH, ENDURANCE, AND MOBILITY: ICD-10-CM

## 2023-11-08 DIAGNOSIS — R68.89 DECREASED STRENGTH, ENDURANCE, AND MOBILITY: ICD-10-CM

## 2023-11-08 DIAGNOSIS — R53.1 DECREASED STRENGTH, ENDURANCE, AND MOBILITY: ICD-10-CM

## 2023-11-08 PROCEDURE — 97110 THERAPEUTIC EXERCISES: CPT | Mod: PN | Performed by: PHYSICAL THERAPIST

## 2023-11-08 NOTE — PROGRESS NOTES
"Physical Therapy Daily Treatment Note     Name: Simon Enriquez [facility] Number: [id number]    Therapy Diagnosis:   Encounter Diagnoses   Name Primary?    Posture imbalance Yes    Decreased strength, endurance, and mobility      Physician: Lashawn Morgan PA*    Visit Date: 11/8/2023    Physician Orders: PT Eval and Treat  Medical Diagnosis from Referral:        Encounter Diagnoses   Name Primary?    Lumbar radiculopathy, chronic      Lumbar muscle pain      Lumbar spondylosis        Evaluation Date: 10/10/2023  Authorization Period Expiration: 10/4/2024  Plan of Care Expiration: 12/10/2023  Progress Note Due: 11/10/2023  Visit # / Visits authorized: 8/10  (plus evaluation)  FOTO: 1/3 (last performed on 10/10/2023)     Precautions: Standard    Time In: 8:35 AM  Time Out: 9:30 AM  Total Billable Time: 55 minutes    SUBJECTIVE     Pt reports: she has relief from pain when she leaves therapy, but she is sore again today.    She was compliant with home exercise program.  Response to previous treatment: relief when she leaves therapy, short-lasting, pain returns following day.  Functional change: Patient reports decreased frequency of urinary leakage.    Pain: 8/10  Location:  neck/shoulder and lower back      TREATMENT     All interventions billed as therapeutic exercise as required by Medicaid:    Simon received therapeutic exercises to develop strength, endurance, ROM, flexibility, posture, and core stabilization for 55 minutes including the following exercises, manual therapy techniques, and neuromuscular re-education activities:  Cervical retraction 10x  Cervical retraction with rotation 10x  Pectoralis stretch over table in supine x 1 minute      The following exercises were deferred:  Lower trunk rotations (LTR) 10x each direction    F4 LTR x 15 ea  (B) piriformis stretch 2 x 30"  (B) hip flexor stretch 2 x 30"  UBE for increased UE ROM, strength, and endurance 3 min x 3 min   Seated Thoracic extension 2 x " "10  Rhomboid stretch 5 x 15"  Thoracic extension over ball while seated in chair 20x  Tibial nerve flossing  Sural nerve flossing  Sciatic nerve flossing  Open book each side  Prone hip extension each side, alternating  Lower trunk rotations both directions    Simon received the following manual therapy techniques: Myofacial release and Soft tissue Mobilization were applied, including:    Myofascial release to left upper trapezius muscles and cervical paraspinals      Simon participated in neuromuscular re-education activities to improve: Coordination, Proprioception, and Posture. The following activities were included:  Alternating hip abduction and adduction isometric 10 x 10 seconds  Zip the Zipper, Hooklying, sitting, standing postures  Diaphragmatic breathing  Core breathing belly pump 10x  Kimberly external rotation against green theraband 2 x 10  Bilateral shoulder extension against red theraband 2 x 10  Anti rotation against green theraband 20x each direction  Seated eccentric lumbar rotation against red theraband cane 20x each    Kinesio tape was applied to the left upper trapezius for inhibition, I strip, 35% tension, 2.5 blocks.    The following exercises were deferred today:  Prone T and I, no resistance 20x  Prone row, no resistance 20x  Weighted diagonals red ball 20x each direction  Zip the Zipper, PPT, mini bridge against green theraband 20x  AIDE hip flexion, abduction, and extension against yellow theraband 2 x 10 each side  Ball on wall mini squats to fatigue    Home Exercises Provided and Patient Education Provided     Education provided:   Patient educated on the impairments noted above and the effects of physical therapy intervention to improve overall condition and QOL.   Patient educated on biomechanical justification for therapeutic exercise and importance of compliance with HEP in order to improve overall impairments and QOL   Patient educated on the importance of improved core and hip strength " in order to improve alignment of the spine and lower extremities with static positions and dynamic movement.   Patient educated on the importance of strong core and lower extremity musculature in order to improve both static and dynamic balance, improve gait mechanics, reduce fall risk and improve household and community mobility.     Written Home Exercises Provided: Patient instructed to cont prior HEP.  Exercises were reviewed and Simon was able to demonstrate them prior to the end of the session.  Simon demonstrated good  understanding of the education provided.     See EMR under Patient Instructions for exercises provided prior visit.    ASSESSMENT   Patient presents with tension throughout the left upper trapezius muscle, which reduced with manual therapy.  Added kinesiotape for upper trapezius inhibition.  Continued focus on improving postural strength while decompressing nerves.  Patient tolerated session well and with appropriate fatigue noted.    Simon Is progressing well towards her goals.   Pt prognosis is Good.     Pt will continue to benefit from skilled outpatient physical therapy to address the deficits listed in the problem list box on initial evaluation, provide pt/family education and to maximize pt's level of independence in the home and community environment.     Pt's spiritual, cultural and educational needs considered and pt agreeable to plan of care and goals.     Anticipated barriers to physical therapy: co-morbidities and chronicity of condition    Goals:   Short Term Goals:  4 weeks Status  Date Met   PAIN: Pt will report worst pain of 8/10 in order to progress toward max functional ability and improve quality of life. [x] Progressing  [] Met  [] Not Met     FUNCTION: Patient will demonstrate improved function as indicated by a score of greater than or equal to 40 out of 100 on FOTO. [x] Progressing  [] Met  [] Not Met     MOBILITY: Patient will improve AROM to 50% of stated goals, listed in  objective measures above, in order to progress towards independence with functional activities.  [x] Progressing  [] Met  [] Not Met     STRENGTH: Patient will improve strength to 50% of stated goals, listed in objective measures above, in order to progress towards independence with functional activities. [x] Progressing  [] Met  [] Not Met     POSTURE: Patient will correct postural deviations in sitting and standing, to decrease pain and promote long term stability.  [x] Progressing  [] Met  [] Not Met     HEP: Patient will demonstrate independence with HEP in order to progress toward functional independence. [x] Progressing  [] Met  [] Not Met        Long Term Goals:  8 weeks Status Date Met   PAIN: Pt will report worst pain of 7/10 in order to progress toward max functional ability and improve quality of life [x] Progressing  [] Met  [] Not Met     FUNCTION: Patient will demonstrate improved function as indicated by a score of greater than or equal to 38 out of 100 on FOTO. [x] Progressing  [] Met  [] Not Met     MOBILITY: Patient will improve AROM to stated goals, listed in objective measures above, in order to return to maximal functional potential and improve quality of life.  [x] Progressing  [] Met  [] Not Met     STRENGTH: Patient will improve strength to stated goals, listed in objective measures above, in order to improve functional independence and quality of life.  [x] Progressing  [] Met  [] Not Met     Patient will return to normal ADL's, IADL's, community involvement, recreational activities, and work-related activities with less than or equal to 7/10 pain and maximal function.  [x] Progressing  [] Met  [] Not Met          PLAN   Continue Plan of Care (POC) and progress per patient tolerance.    Yocasta Carrera, PT, DPT, PPCES  11/6/2023

## 2023-11-13 ENCOUNTER — OFFICE VISIT (OUTPATIENT)
Dept: PAIN MEDICINE | Facility: CLINIC | Age: 38
End: 2023-11-13
Payer: MEDICAID

## 2023-11-13 DIAGNOSIS — M54.16 LUMBAR RADICULOPATHY, CHRONIC: Primary | ICD-10-CM

## 2023-11-13 DIAGNOSIS — M79.18 CHRONIC MYOFASCIAL PAIN: ICD-10-CM

## 2023-11-13 DIAGNOSIS — M79.18 LUMBAR MUSCLE PAIN: ICD-10-CM

## 2023-11-13 DIAGNOSIS — M79.12 MYALGIA OF AUXILIARY MUSCLES, HEAD AND NECK: ICD-10-CM

## 2023-11-13 DIAGNOSIS — G89.29 CHRONIC MYOFASCIAL PAIN: ICD-10-CM

## 2023-11-13 PROCEDURE — 3044F PR MOST RECENT HEMOGLOBIN A1C LEVEL <7.0%: ICD-10-PCS | Mod: CPTII,95,, | Performed by: PHYSICAL MEDICINE & REHABILITATION

## 2023-11-13 PROCEDURE — 3044F HG A1C LEVEL LT 7.0%: CPT | Mod: CPTII,95,, | Performed by: PHYSICAL MEDICINE & REHABILITATION

## 2023-11-13 PROCEDURE — 99213 PR OFFICE/OUTPT VISIT, EST, LEVL III, 20-29 MIN: ICD-10-PCS | Mod: 95,,, | Performed by: PHYSICAL MEDICINE & REHABILITATION

## 2023-11-13 PROCEDURE — 99213 OFFICE O/P EST LOW 20 MIN: CPT | Mod: 95,,, | Performed by: PHYSICAL MEDICINE & REHABILITATION

## 2023-11-13 PROCEDURE — 1160F RVW MEDS BY RX/DR IN RCRD: CPT | Mod: CPTII,95,, | Performed by: PHYSICAL MEDICINE & REHABILITATION

## 2023-11-13 PROCEDURE — 1160F PR REVIEW ALL MEDS BY PRESCRIBER/CLIN PHARMACIST DOCUMENTED: ICD-10-PCS | Mod: CPTII,95,, | Performed by: PHYSICAL MEDICINE & REHABILITATION

## 2023-11-13 PROCEDURE — 1159F PR MEDICATION LIST DOCUMENTED IN MEDICAL RECORD: ICD-10-PCS | Mod: CPTII,95,, | Performed by: PHYSICAL MEDICINE & REHABILITATION

## 2023-11-13 PROCEDURE — 1159F MED LIST DOCD IN RCRD: CPT | Mod: CPTII,95,, | Performed by: PHYSICAL MEDICINE & REHABILITATION

## 2023-11-13 NOTE — PROGRESS NOTES
Established Patient - TeleHealth Visit    The patient location is: [place]  The chief complaint leading to consultation is: chronic pain     Visit type: audiovisual    Face to Face time with patient: 10-15 minutes  20 minutes of total time spent on the encounter, which includes face to face time and non-face to face time preparing to see the patient (eg, review of tests), Obtaining and/or reviewing separately obtained history, Documenting clinical information in the electronic or other health record, Independently interpreting results (not separately reported) and communicating results to the patient/family/caregiver, or Care coordination (not separately reported).     Each patient to whom he or she provides medical services by telemedicine is:  (1) informed of the relationship between the physician and patient and the respective role of any other health care provider with respect to management of the patient; and (2) notified that he or she may decline to receive medical services by telemedicine and may withdraw from such care at any time.      Established Patient Chronic Pain Note (Follow up visit)    Chief Complaint:   Lower back and leg pain      SUBJECTIVE:    Simon Reynolds is a 37 y.o. female presents today for follow-up   Back and leg pain.  She was last seen for procedure on 09/28/2023 where she underwent bilateral L4-5 TFESI.  She reports that this resulted in no relief unfortunately.  She has been started on physical therapy in the interim which he feels has reduced her pain by at least 30-40% and in still has several visits remaining.  She does find relief with coping/dry needling for her lower back, but limited relief with her cervical myofascial pain.  She still is utilizing gabapentin 400 mg t.i.d. and tizanidine 0.5-1 tablet up to 4 times daily as needed..      Patient denies night fever/night sweats, urinary incontinence, bowel incontinence, significant weight loss, significant motor weakness and  loss of sensations.    Pain Disability Index Review:      8/10/2022    12:54 PM   Last 3 PDI Scores   Pain Disability Index (PDI) 49        Interval HPI 09/11/2023:  Simon Reynolds is a 37 y.o. female presents today for follow-up continued back and leg pain.  She as the same type and quality of pain in her lumbosacral region with radiation to both of her lower extremities, left worse than right.  She has tried Lyrica, but was unable tolerate after 3 nights as she felt more alert and had hallucinations and discontinue the medication after these symptoms.  She still has some gabapentin remaining has been utilizing this while she finds effective and able tolerate.  She also has been using ibuprofen as Zanaflex p.r.n..  Current pain intensity is 8/10.    Interval History (6/29/2023): Patient was last seen on 2/23/2023. she presents today for follow-up for medication refill.  Since the last visit, she reports she had an injury at work where she was lifting a 300 lb female, and she immediately hurt her back.  She quit that job and is now doing more sedentary work.  She feels her pain is still severe.  Pain starts in the low back and goes into both legs and feels that her feet are crampy.  She continues taking gabapentin 3 times a day, but she does not feel this is providing adequate pain relief.  She recently had an MRI as well.  She continues taking the muscle relaxer.  She was recently given Toradol by mouth in Toradol IM today in clinic.  She took a Medrol Dosepak in the beginning of the injury about 6 weeks ago.  She has not had any steroids recently.  She plans to restart physical therapy now that her job is more flexible.    Interval History (2/23/2023):  Simon Reynolds presents today for follow-up visit.  Patient was last seen about 6 weeks ago . At that visit, the plan was to start physical therapy.  She was not able to attend regularly due to work schedule.  She has been doing at home exercises and  "stretching, which she feels is helping her pain overall.  She still feels that she is stiff and sore, but her pain is decreased.  She is requesting a different muscle relaxer today as Flexeril is not helping.  She tried Robaxin in the past with limited pain relief as well. Patient reports pain as 6/10 today.    Interval HPI (01/18/2023):  Simon Reynolds is a 37 y.o. female who presents to the clinic for a follow-up appointment for lower back pain. Since the last visit, Simon Reynolds states the pain has been improving. Current pain intensity is 6/10.  At the last visit, she was referred to physical therapy along with refills provided of Flexeril and increasing gabapentin to 400 t.i.d..  She was continued on Pamelor and Topamax.  She has completed her initial evaluation physical therapy recently, no set up for the next several weeks to undergo treatments 2 times weekly.      Interval History (11/10/2022):  Simon Reynolds presents today for follow-up visit.  Patient was last seen on 8/29/2022. At that visit, the plan was to start physical therapy. She will start going next month. She feels the pain is worsening and may be more open to procedures now. She feels more comfortable with our clinic than she has at her previous practice. Patient reports pain as 9/10 today.     Interval History (8/29/2022):  Simon Reynolds presents today for follow-up visit.  Patient was last seen about 3 weeks ago. She presents today on telemedicine visit to discuss MRI results (ordered by PCP). She c/o low back pain + BLE radiculopathy. She reports that she is uninterested in procedures because of "bad reactions in the past." She states that she refused physical therapy while she was on WC because "no one would tell her what was wrong with her". She is inquisitive about her condition today as well.        Initial HPI (8/10/22):  Simon Reynolds is a 36 y.o. female who presents to the clinic for the evaluation of lower " "back and leg pain.   She was referred by her primary care provider for further evaluation and management of this pain.  She has a past medical history of migraines, epilepsy, and multiple other medical comorbidities as listed in her chart.  The pain started 2-3 years ago following a work related injury and symptoms have been unchanged.The pain is located in the lumbosacral area and radiates to the lower extremities.  The pain is described as  Sharp, stabbing, throbbing, aching, electric, numbness/tingling  and is rated as 7/10. The pain is rated with a score of  6/10 on the BEST day and a score of 10/10 on the WORST day.  Symptoms interfere with daily activity. The pain is exacerbated by bending, squatting.  The pain is mitigated by heat/ice and laying down.        Non-Pharmacologic Treatments:  Physical Therapy/Home Exercise: yes , currently active  Ice/Heat:yes  TENS: yes  Acupuncture: no  Massage: yes  Chiropractic: no    Other: no        Pain Medications:  - Opioids: Tramadol, Norco, Percocet, methadone  - Adjuvant Medications: NSAIDs, gabapentin, Cymbalta, Tylenol, baclofen, Zanaflex, Flexeril, lidocaine patch, Celebrex, amitriptyline, nortriptyline, Topamax, Voltaren gel  - Anti-Coagulants: None        Pain Procedures:   Externally: Previous cervical and lumbar interventions - per reporting: had several (>5) lum ESIs & reports having "bad reaction" to interventional pain procedures in the past (once being unable to walk after)        Imaging (Reviewed on 11/13/2023):      8/24/2022 MRI Lumbar Spine Without Contrast  FINDINGS:  The distal cord and conus appear intrinsically normal.     The cauda equina nerve roots appear normal     Partially included on this exam is a fluid signal intensity structure in the right lateral epidural space at the T11 and T12 vertebral body level.  The lesion displays fluid signal intensity characteristics and extends into the right T12-L1 neural foramen.  There is mild chronic " remodeling of the posterior aspect of the T12 vertebral body and right T12 pedicle.  Approximate size 12.3 x 4.8 mm transversely with craniocaudal length of 33.9 mm.  Probably represents a chronic benign fluid signal intensity collection such as an arachnoid cyst or perineural cyst/diverticulum.     T12-L1: Disc level unremarkable.     L1-2:     The disc level is unremarkable.     L2-3:     The disc level is unremarkable.     L3-4:     Minor facet arthrosis.     L4-5:     Minor disc desiccation with annular bulge.  Moderate left and mild right hypertrophic facet arthrosis.     L5-S1:    Unremarkable.  Impression:   Minor L4-5 disc bulge with moderate left and mild right facet arthrosis.  Mild foraminal stenosis.  Fluid signal intensity structure at the right T11/T12 level most consistent with benign fluid collection such as an arachnoid cyst or perineural diverticulum.  See above for detail.        CT abdomen pelvis 10/09/2021:  The visualized lung bases are clear. The heart is normal in size.     The right hepatic lobe measures 19.5 cm in span. The left hepatic lobe measures 7.9 cm in span. The spleen is normal in size. The liver and spleen are normal in density.     There is some linear increased density dependently in the gallbladder which is probably related to a small fold in the gallbladder rather than representing any gallstones. Otherwise, the gallbladder and biliary ductal system are normal.     The pancreas, adrenal glands, and right kidney are normal. One or two small cortical cysts are seen anteriorly in the left upper pole renal cortex. There is no obvious renal or ureteral calculus or hydronephrosis.     The anterior wall of the urinary bladder appears slightly thickened, which, at least in part, may be related to the partially decompressed state of the urinary bladder.     The uterus is midline and anteverted. The large right ovarian cyst seen on the previous CT has resolved in the interim, with no  abnormal ovarian mass currently seen. There is no free fluid within the pelvis and no ascites is noted. No adenopathy is seen.     The appendix is normal. No abnormal bowel distention or bowel wall thickening is noted. There are multiple small diverticula along the descending colon and at the junction of the descending colon and sigmoid colon. No diverticulitis.     The regional skeleton is intact. No fracture or dislocation is evident. No lytic or blastic bone lesion is seen.        REVIEW OF SYSTEMS:    GENERAL:  No weight loss, malaise or fevers.  HEENT:   No recent changes in vision or hearing  NECK:  Negative for lumps, no difficulty with swallowing.  RESPIRATORY:  Negative for cough, wheezing or shortness of breath, patient denies any recent URI.  CARDIOVASCULAR:  Negative for chest pain, leg swelling or palpitations.  GI:  Negative for abdominal discomfort, blood in stools or black stools or change in bowel habits.  MUSCULOSKELETAL:  See HPI.  SKIN:  Negative for lesions, rash, and itching.  PSYCH:  No mood disorder or recent psychosocial stressors.  Patients sleep is not disturbed secondary to pain.  HEMATOLOGY/LYMPHOLOGY:  Negative for prolonged bleeding, bruising easily or swollen nodes.  Patient is not currently taking any anti-coagulants  NEURO:   No history of headaches, syncope, paralysis, seizures or tremors.  All other reviewed and negative other than HPI.      OBJECTIVE:    Physical exam:  GENERAL: Well appearing, in no acute distress, alert and oriented x3.    PSYCH:  Mood and affect appropriate.  SKIN: Skin color, texture, turgor normal, no rashes or lesions.  HEAD/FACE:  Normocephalic, atraumatic. Cranial nerves grossly intact.  CV:  No peripheral edema noted  PULM:  No difficulty breathing                ASSESSMENT: 37 y.o. year old female with lower back pain, consistent with     1. Lumbar radiculopathy, chronic        2. Lumbar muscle pain        3. Myalgia of auxiliary muscles, head and neck         4. Chronic myofascial pain                PLAN:   1. Interventional:     -s/p bilateral L4-5 TFESI on 09/20/2023, limited relief    2. Pharmacologic:   - continue gabapentin 400mg TID as she found this effective and was able to tolerate compared to Lyrica  - continue ibuprofen 600 mg TID PRN.   - continue Zanaflex 4mg TID PRN muscle spasms (or can take 1/2 tablet). Patient understands this may cause drowsiness. Previously failedFlexeril 10mg QHS- BID PRN - ineffective; also failed Robaxin in the past.  - Continue Topamax 100mg BID - from PCP - for epilepsy & migraines.  - Anticoagulation use: None.              3. Rehabilitative:  Advised patient to continue with activities as tolerated     4. Diagnostic:   - reviewed labs and imaging available  - Previously sent Request for imaging, office visit notes, and procedure notes from Miranda Jones MD ([facility]-[address] [place] [address] , [place] [address]-Epzff: [phone]). Nothing received.     5. Follow up:  10-12 weeks or as needed       - This condition does not require this patient to take time off of work, and the primary goal of our Pain Management services is to improve the patient's functional capacity.     - I discussed the risks, benefits, and alternatives to potential treatment options. All questions and concerns were fully addressed today in clinic.       Michele Hernandez MD  Interventional Pain Medicine  [facility] [place]      Disclaimer:  This note was prepared using voice recognition system and is likely to have sound alike errors that may have been overlooked even after proof reading.  Please call me with any questions.

## 2023-11-29 DIAGNOSIS — M79.18 LUMBAR MUSCLE PAIN: ICD-10-CM

## 2023-11-29 RX ORDER — TIZANIDINE 4 MG/1
TABLET ORAL
Qty: 90 TABLET | Refills: 2 | Status: SHIPPED | OUTPATIENT
Start: 2023-11-29 | End: 2024-01-17 | Stop reason: SDUPTHER

## 2023-11-29 NOTE — TELEPHONE ENCOUNTER
Pt is requesting for a refill of: tiZANidine (ZANAFLEX) 4 MG tablet   Last filed:9/11/23  Last encounter: 11/13/23  Up coming apt: 2/05/23  Pharmacy:[id number] - [address]   Is this something you can do?

## 2023-12-11 DIAGNOSIS — M54.16 LUMBAR RADICULOPATHY, CHRONIC: ICD-10-CM

## 2023-12-11 DIAGNOSIS — K59.03 THERAPEUTIC OPIOID-INDUCED CONSTIPATION (OIC): ICD-10-CM

## 2023-12-11 DIAGNOSIS — E78.5 HYPERLIPIDEMIA, UNSPECIFIED HYPERLIPIDEMIA TYPE: ICD-10-CM

## 2023-12-11 DIAGNOSIS — T40.2X5A THERAPEUTIC OPIOID-INDUCED CONSTIPATION (OIC): ICD-10-CM

## 2023-12-11 RX ORDER — RIMEGEPANT SULFATE 75 MG/75MG
75 TABLET, ORALLY DISINTEGRATING ORAL DAILY PRN
Qty: 30 TABLET | Refills: 0 | Status: SHIPPED | OUTPATIENT
Start: 2023-12-11

## 2023-12-11 RX ORDER — GABAPENTIN 400 MG/1
400 CAPSULE ORAL 3 TIMES DAILY
Qty: 90 CAPSULE | Refills: 0 | OUTPATIENT
Start: 2023-12-11

## 2023-12-11 RX ORDER — TOPIRAMATE 200 MG/1
200 TABLET ORAL EVERY MORNING
Qty: 30 TABLET | Refills: 0 | Status: SHIPPED | OUTPATIENT
Start: 2023-12-11

## 2023-12-11 RX ORDER — ATORVASTATIN CALCIUM 40 MG/1
40 TABLET, FILM COATED ORAL DAILY
Qty: 30 TABLET | Refills: 0 | Status: SHIPPED | OUTPATIENT
Start: 2023-12-11 | End: 2024-01-18

## 2023-12-11 RX ORDER — ONDANSETRON 4 MG/1
TABLET, ORALLY DISINTEGRATING ORAL
Qty: 30 TABLET | Refills: 0 | Status: SHIPPED | OUTPATIENT
Start: 2023-12-11

## 2024-01-17 DIAGNOSIS — M54.16 LUMBAR RADICULOPATHY, CHRONIC: ICD-10-CM

## 2024-01-17 DIAGNOSIS — M79.18 LUMBAR MUSCLE PAIN: ICD-10-CM

## 2024-01-17 DIAGNOSIS — E78.5 HYPERLIPIDEMIA, UNSPECIFIED HYPERLIPIDEMIA TYPE: ICD-10-CM

## 2024-01-18 RX ORDER — ATORVASTATIN CALCIUM 40 MG/1
40 TABLET, FILM COATED ORAL
Qty: 30 TABLET | Refills: 0 | Status: SHIPPED | OUTPATIENT
Start: 2024-01-18 | End: 2024-02-05

## 2024-01-18 RX ORDER — GABAPENTIN 400 MG/1
400 CAPSULE ORAL 3 TIMES DAILY
Qty: 90 CAPSULE | Refills: 2 | Status: SHIPPED | OUTPATIENT
Start: 2024-01-18 | End: 2024-02-05 | Stop reason: DRUGHIGH

## 2024-01-18 RX ORDER — TIZANIDINE 4 MG/1
TABLET ORAL
Qty: 90 TABLET | Refills: 2 | Status: SHIPPED | OUTPATIENT
Start: 2024-01-18 | End: 2024-04-29

## 2024-02-05 ENCOUNTER — TELEPHONE (OUTPATIENT)
Dept: PAIN MEDICINE | Facility: CLINIC | Age: 39
End: 2024-02-05
Payer: MEDICAID

## 2024-02-05 ENCOUNTER — OFFICE VISIT (OUTPATIENT)
Dept: PAIN MEDICINE | Facility: CLINIC | Age: 39
End: 2024-02-05
Payer: MEDICAID

## 2024-02-05 VITALS
SYSTOLIC BLOOD PRESSURE: 113 MMHG | WEIGHT: 147.25 LBS | HEART RATE: 91 BPM | DIASTOLIC BLOOD PRESSURE: 75 MMHG | BODY MASS INDEX: 25.14 KG/M2 | HEIGHT: 64 IN

## 2024-02-05 DIAGNOSIS — N39.46 MIXED STRESS AND URGE URINARY INCONTINENCE: Primary | ICD-10-CM

## 2024-02-05 DIAGNOSIS — M54.16 LUMBAR RADICULOPATHY, CHRONIC: ICD-10-CM

## 2024-02-05 DIAGNOSIS — E78.5 HYPERLIPIDEMIA, UNSPECIFIED HYPERLIPIDEMIA TYPE: ICD-10-CM

## 2024-02-05 PROCEDURE — 99214 OFFICE O/P EST MOD 30 MIN: CPT | Mod: S$PBB,,, | Performed by: PHYSICAL MEDICINE & REHABILITATION

## 2024-02-05 PROCEDURE — 99213 OFFICE O/P EST LOW 20 MIN: CPT | Mod: PBBFAC | Performed by: PHYSICAL MEDICINE & REHABILITATION

## 2024-02-05 PROCEDURE — 1159F MED LIST DOCD IN RCRD: CPT | Mod: CPTII,,, | Performed by: PHYSICAL MEDICINE & REHABILITATION

## 2024-02-05 PROCEDURE — 3008F BODY MASS INDEX DOCD: CPT | Mod: CPTII,,, | Performed by: PHYSICAL MEDICINE & REHABILITATION

## 2024-02-05 PROCEDURE — 3078F DIAST BP <80 MM HG: CPT | Mod: CPTII,,, | Performed by: PHYSICAL MEDICINE & REHABILITATION

## 2024-02-05 PROCEDURE — 99999 PR PBB SHADOW E&M-EST. PATIENT-LVL III: CPT | Mod: PBBFAC,,, | Performed by: PHYSICAL MEDICINE & REHABILITATION

## 2024-02-05 PROCEDURE — 3074F SYST BP LT 130 MM HG: CPT | Mod: CPTII,,, | Performed by: PHYSICAL MEDICINE & REHABILITATION

## 2024-02-05 RX ORDER — GABAPENTIN 600 MG/1
600 TABLET ORAL 3 TIMES DAILY
Qty: 90 TABLET | Refills: 11 | Status: SHIPPED | OUTPATIENT
Start: 2024-02-05 | End: 2025-02-04

## 2024-02-05 RX ORDER — ATORVASTATIN CALCIUM 40 MG/1
40 TABLET, FILM COATED ORAL
Qty: 30 TABLET | Refills: 0 | Status: SHIPPED | OUTPATIENT
Start: 2024-02-05 | End: 2024-02-05

## 2024-02-05 NOTE — PROGRESS NOTES
Established Patient Chronic Pain Note (Follow up visit)    Chief Complaint:   Chief Complaint   Patient presents with    Low-back Pain    Leg Pain       SUBJECTIVE:    Simon Reynolds is a 38 y.o. female who presents to the clinic for a follow-up appointment for follow-up chronic back and leg pain, left worse than right. Since the last visit, Simon Reynolds states the pain has been stable. Current pain intensity is 7/10.  She continues to use gabapentin, ibuprofen, Zanaflex, and Topamax as prescribed.  Physical therapy due to conflict with work schedule.  She reports that she has more of a workload now and has exacerbated her back and leg pain that is affecting her quality of life.    Patient denies night fever/night sweats, urinary incontinence, bowel incontinence, significant weight loss, significant motor weakness, and loss of sensations.    Pain Disability Index Review:       2/5/2024    11:19 AM 8/10/2022    12:54 PM   Last 3 PDI Scores   Pain Disability Index (PDI) 59 49         Interval HPI 11/13/2023:  Simon Reynolds is a 37 y.o. female presents today for follow-up   Back and leg pain.  She was last seen for procedure on 09/28/2023 where she underwent bilateral L4-5 TFESI.  She reports that this resulted in no relief unfortunately.  She has been started on physical therapy in the interim which he feels has reduced her pain by at least 30-40% and in still has several visits remaining.  She does find relief with coping/dry needling for her lower back, but limited relief with her cervical myofascial pain.  She still is utilizing gabapentin 400 mg t.i.d. and tizanidine 0.5-1 tablet up to 4 times daily as needed..     Interval HPI 09/11/2023:  Simon Reynolds is a 37 y.o. female presents today for follow-up continued back and leg pain.  She as the same type and quality of pain in her lumbosacral region with radiation to both of her lower extremities, left worse than right.  She has tried Lyrica, but  was unable tolerate after 3 nights as she felt more alert and had hallucinations and discontinue the medication after these symptoms.  She still has some gabapentin remaining has been utilizing this while she finds effective and able tolerate.  She also has been using ibuprofen as Zanaflex p.r.n..  Current pain intensity is 8/10.     Interval History (6/29/2023): Patient was last seen on 2/23/2023. she presents today for follow-up for medication refill.  Since the last visit, she reports she had an injury at work where she was lifting a 300 lb female, and she immediately hurt her back.  She quit that job and is now doing more sedentary work.  She feels her pain is still severe.  Pain starts in the low back and goes into both legs and feels that her feet are crampy.  She continues taking gabapentin 3 times a day, but she does not feel this is providing adequate pain relief.  She recently had an MRI as well.  She continues taking the muscle relaxer.  She was recently given Toradol by mouth in Toradol IM today in clinic.  She took a Medrol Dosepak in the beginning of the injury about 6 weeks ago.  She has not had any steroids recently.  She plans to restart physical therapy now that her job is more flexible.     Interval History (2/23/2023):  Simon Reynolds presents today for follow-up visit.  Patient was last seen about 6 weeks ago . At that visit, the plan was to start physical therapy.  She was not able to attend regularly due to work schedule.  She has been doing at home exercises and stretching, which she feels is helping her pain overall.  She still feels that she is stiff and sore, but her pain is decreased.  She is requesting a different muscle relaxer today as Flexeril is not helping.  She tried Robaxin in the past with limited pain relief as well. Patient reports pain as 6/10 today.     Interval HPI (01/18/2023):  Simon Reynolds is a 37 y.o. female who presents to the clinic for a follow-up  "appointment for lower back pain. Since the last visit, Simon Reynolds states the pain has been improving. Current pain intensity is 6/10.  At the last visit, she was referred to physical therapy along with refills provided of Flexeril and increasing gabapentin to 400 t.i.d..  She was continued on Pamelor and Topamax.  She has completed her initial evaluation physical therapy recently, no set up for the next several weeks to undergo treatments 2 times weekly.        Interval History (11/10/2022):  Simon Reynolds presents today for follow-up visit.  Patient was last seen on 8/29/2022. At that visit, the plan was to start physical therapy. She will start going next month. She feels the pain is worsening and may be more open to procedures now. She feels more comfortable with our clinic than she has at her previous practice. Patient reports pain as 9/10 today.     Interval History (8/29/2022):  Simon Reynolds presents today for follow-up visit.  Patient was last seen about 3 weeks ago. She presents today on telemedicine visit to discuss MRI results (ordered by PCP). She c/o low back pain + BLE radiculopathy. She reports that she is uninterested in procedures because of "bad reactions in the past." She states that she refused physical therapy while she was on WC because "no one would tell her what was wrong with her". She is inquisitive about her condition today as well.        Initial HPI (8/10/22):  Simon Reynolds is a 36 y.o. female who presents to the clinic for the evaluation of lower back and leg pain.   She was referred by her primary care provider for further evaluation and management of this pain.  She has a past medical history of migraines, epilepsy, and multiple other medical comorbidities as listed in her chart.  The pain started 2-3 years ago following a work related injury and symptoms have been unchanged.The pain is located in the lumbosacral area and radiates to the lower extremities.  The " "pain is described as  Sharp, stabbing, throbbing, aching, electric, numbness/tingling  and is rated as 7/10. The pain is rated with a score of  6/10 on the BEST day and a score of 10/10 on the WORST day.  Symptoms interfere with daily activity. The pain is exacerbated by bending, squatting.  The pain is mitigated by heat/ice and laying down.        Non-Pharmacologic Treatments:  Physical Therapy/Home Exercise: yes , currently active  Ice/Heat:yes  TENS: yes  Acupuncture: no  Massage: yes  Chiropractic: no    Other: no        Pain Medications:  - Opioids: Tramadol, Norco, Percocet, methadone  - Adjuvant Medications: NSAIDs, gabapentin, Cymbalta, Tylenol, baclofen, Zanaflex, Flexeril, lidocaine patch, Celebrex, amitriptyline, nortriptyline, Topamax, Voltaren gel  - Anti-Coagulants: None        Pain Procedures:   Externally: Previous cervical and lumbar interventions - per reporting: had several (>5) lum ESIs & reports having "bad reaction" to interventional pain procedures in the past (once being unable to walk after)  Bilateral L4-5 TFESI on 09/28/2023, 30-40% relief (attributes this to severe pain flare following MIKE)        Imaging (Reviewed on 11/13/2023):      8/24/2022 MRI Lumbar Spine Without Contrast  FINDINGS:  The distal cord and conus appear intrinsically normal.     The cauda equina nerve roots appear normal     Partially included on this exam is a fluid signal intensity structure in the right lateral epidural space at the T11 and T12 vertebral body level.  The lesion displays fluid signal intensity characteristics and extends into the right T12-L1 neural foramen.  There is mild chronic remodeling of the posterior aspect of the T12 vertebral body and right T12 pedicle.  Approximate size 12.3 x 4.8 mm transversely with craniocaudal length of 33.9 mm.  Probably represents a chronic benign fluid signal intensity collection such as an arachnoid cyst or perineural cyst/diverticulum.     T12-L1: Disc level " unremarkable.     L1-2:     The disc level is unremarkable.     L2-3:     The disc level is unremarkable.     L3-4:     Minor facet arthrosis.     L4-5:     Minor disc desiccation with annular bulge.  Moderate left and mild right hypertrophic facet arthrosis.     L5-S1:    Unremarkable.  Impression:   Minor L4-5 disc bulge with moderate left and mild right facet arthrosis.  Mild foraminal stenosis.  Fluid signal intensity structure at the right T11/T12 level most consistent with benign fluid collection such as an arachnoid cyst or perineural diverticulum.  See above for detail.        CT abdomen pelvis 10/09/2021:  The visualized lung bases are clear. The heart is normal in size.     The right hepatic lobe measures 19.5 cm in span. The left hepatic lobe measures 7.9 cm in span. The spleen is normal in size. The liver and spleen are normal in density.     There is some linear increased density dependently in the gallbladder which is probably related to a small fold in the gallbladder rather than representing any gallstones. Otherwise, the gallbladder and biliary ductal system are normal.     The pancreas, adrenal glands, and right kidney are normal. One or two small cortical cysts are seen anteriorly in the left upper pole renal cortex. There is no obvious renal or ureteral calculus or hydronephrosis.     The anterior wall of the urinary bladder appears slightly thickened, which, at least in part, may be related to the partially decompressed state of the urinary bladder.     The uterus is midline and anteverted. The large right ovarian cyst seen on the previous CT has resolved in the interim, with no abnormal ovarian mass currently seen. There is no free fluid within the pelvis and no ascites is noted. No adenopathy is seen.     The appendix is normal. No abnormal bowel distention or bowel wall thickening is noted. There are multiple small diverticula along the descending colon and at the junction of the descending  colon and sigmoid colon. No diverticulitis.     The regional skeleton is intact. No fracture or dislocation is evident. No lytic or blastic bone lesion is seen.       PMHx,PSHx, Social history, and Family history:  I have reviewed the patient's medical, surgical, social, and family history in detail and updated the computerized patient record.    Review of patient's allergies indicates:   Allergen Reactions    Sulfa (sulfonamide antibiotics) Anaphylaxis, Hives, Itching and Other (See Comments)       Current Outpatient Medications   Medication Sig    acetaminophen 325 mg Cap Tylenol    biotin 1 mg Cap Take by mouth.    linaCLOtide (LINZESS) 72 mcg Cap capsule Take 1 capsule (72 mcg total) by mouth before breakfast.    melatonin 5 mg Cap Take 5 mg by mouth every evening.    multivit with minerals/lutein (MULTIVITAMIN 50 PLUS ORAL) multivitamin    ondansetron (ZOFRAN-ODT) 4 MG TbDL DISSOLVE 1 TABLET UNDER THE TONGUE EVERY 6 HOURS AS NEEDED FOR NAUSEA AND VOMITING    rimegepant (NURTEC) 75 mg odt Take 1 tablet (75 mg total) by mouth daily as needed for Migraine.    tiZANidine (ZANAFLEX) 4 MG tablet TAKE 1/2 TO 1 TABLET BY MOUTH THREE TIMES DAILY AS NEEDED FOR MUSCLE SPASMS; MAY CAUSE DROWSINESS    topiramate (TOPAMAX) 200 MG Tab Take 1 tablet (200 mg total) by mouth every morning.    gabapentin (NEURONTIN) 600 MG tablet Take 1 tablet (600 mg total) by mouth 3 (three) times daily.    oxyCODONE-acetaminophen (PERCOCET) 5-325 mg per tablet Take 1-2 tablets by mouth.    traMADoL (ULTRAM) 50 mg tablet Take 50 mg by mouth every 8 (eight) hours as needed.     No current facility-administered medications for this visit.         REVIEW OF SYSTEMS:    GENERAL:  No weight loss, malaise or fevers.  HEENT:   No recent changes in vision or hearing  NECK:  Negative for lumps, no difficulty with swallowing.  RESPIRATORY:  Negative for cough, wheezing or shortness of breath, patient denies any recent URI.  CARDIOVASCULAR:  Negative for  "chest pain, leg swelling or palpitations.  GI:  Negative for abdominal discomfort, blood in stools or black stools or change in bowel habits.  MUSCULOSKELETAL:  See HPI.  SKIN:  Negative for lesions, rash, and itching.  PSYCH:  No mood disorder or recent psychosocial stressors.  Patients sleep is not disturbed secondary to pain.  HEMATOLOGY/LYMPHOLOGY:  Negative for prolonged bleeding, bruising easily or swollen nodes.  Patient is not currently taking any anti-coagulants  NEURO:   No history of headaches, syncope, paralysis, seizures or tremors.  All other reviewed and negative other than HPI.    OBJECTIVE:    /75   Pulse 91   Ht 5' 4" (1.626 m)   Wt 66.8 kg (147 lb 4.3 oz)   LMP 12/02/2021 (Exact Date)   BMI 25.28 kg/m²     PHYSICAL EXAMINATION:    GENERAL: Well appearing, in no acute distress, alert and oriented x3.  PSYCH:  Mood and affect appropriate.  SKIN: Skin color, texture, turgor normal, no rashes or lesions.  HEAD/FACE:  Normocephalic, atraumatic. Cranial nerves grossly intact.  CV: RRR with palpation of the radial artery.  PULM: No evidence of respiratory difficulty, symmetric chest rise.  GI:  Soft and non-tender.  BACK: Straight leg raising in the sitting and supine positions is positive to radicular pain bilaterally.  Mild-to-moderate pain to palpation over the facet joints of the lumbar spine and lumbar paraspinals.  Limited range of motion secondary to pain reproduction.  EXTREMITIES:  No deformities, edema, or skin discoloration. Good capillary refill.  MUSCULOSKELETAL: Shoulder, hip, and knee provocative maneuvers are negative.  There is no pain with palpation over the sacroiliac joints bilaterally.  FABERs test is negative.  FADIRs test is negative.   Bilateral upper and lower extremity strength is normal and symmetric.  No atrophy or tone abnormalities are noted.  NEURO: Bilateral upper and lower extremity coordination and muscle stretch reflexes are physiologic and symmetric.  " Plantar response are downgoing. No clonus.  No loss of sensation is noted with the exception of diminished sensation light touch in an L4 and L5 dermatomal pattern bilaterally.  GAIT: normal.      LABS:  Lab Results   Component Value Date    WBC 8.94 01/24/2023    HGB 14.1 01/24/2023    HCT 45.2 01/24/2023    MCV 85 01/24/2023     01/24/2023       CMP  Sodium   Date Value Ref Range Status   01/24/2023 140 136 - 145 mmol/L Final     Potassium   Date Value Ref Range Status   01/24/2023 4.1 3.5 - 5.1 mmol/L Final     Chloride   Date Value Ref Range Status   01/24/2023 108 95 - 110 mmol/L Final     CO2   Date Value Ref Range Status   01/24/2023 25 23 - 29 mmol/L Final     Glucose   Date Value Ref Range Status   01/24/2023 85 70 - 110 mg/dL Final     BUN   Date Value Ref Range Status   01/24/2023 10 6 - 20 mg/dL Final     Creatinine   Date Value Ref Range Status   01/24/2023 1.0 0.5 - 1.4 mg/dL Final     Calcium   Date Value Ref Range Status   01/24/2023 9.8 8.7 - 10.5 mg/dL Final     Total Protein   Date Value Ref Range Status   01/24/2023 7.6 6.0 - 8.4 g/dL Final     Albumin   Date Value Ref Range Status   01/24/2023 4.1 3.5 - 5.2 g/dL Final     Total Bilirubin   Date Value Ref Range Status   01/24/2023 0.3 0.1 - 1.0 mg/dL Final     Comment:     For infants and newborns, interpretation of results should be based  on gestational age, weight and in agreement with clinical  observations.    Premature Infant recommended reference ranges:  Up to 24 hours.............<8.0 mg/dL  Up to 48 hours............<12.0 mg/dL  3-5 days..................<15.0 mg/dL  6-29 days.................<15.0 mg/dL       Alkaline Phosphatase   Date Value Ref Range Status   01/24/2023 97 55 - 135 U/L Final     AST   Date Value Ref Range Status   01/24/2023 11 10 - 40 U/L Final     ALT   Date Value Ref Range Status   01/24/2023 34 10 - 44 U/L Final     Anion Gap   Date Value Ref Range Status   01/24/2023 7 (L) 8 - 16 mmol/L Final     eGFR if     Date Value Ref Range Status   12/10/2021 >60.0 >60 mL/min/1.73 m^2 Final     eGFR if non    Date Value Ref Range Status   12/10/2021 >60.0 >60 mL/min/1.73 m^2 Final     Comment:     Calculation used to obtain the estimated glomerular filtration  rate (eGFR) is the CKD-EPI equation.          Lab Results   Component Value Date    HGBA1C 4.9 01/24/2023             ASSESSMENT: 38 y.o. year old female with chronic back and leg pain, consistent with     1. Mixed stress and urge urinary incontinence  Ambulatory referral/consult to Urology      2. Lumbar radiculopathy, chronic  IR MIKE Lumbar w/ Img    Case Request-RAD/Other Procedure Area: Lumbar L4-5 IL MIKE            PLAN:   - Interventional:    -scheduled for L4-5 IL MIKE in attempt to avoid post injection flare and provider with lumbar radicular pain relief   -s/p bilateral L4-5 TFESI on 09/20/2023, 30-40% relief     - Pharmacologic:   - continue gabapentin but will increase to 600 mg TID  - continue ibuprofen 600 mg TID PRN.   - continue Zanaflex 4mg TID PRN muscle spasms (or can take 1/2 tablet). Patient understands this may cause drowsiness. Previously failed Flexeril 10mg QHS- BID PRN - ineffective; also failed Robaxin in the past.  - Continue Topamax 100mg BID - from PCP - for epilepsy & migraines.  - Anticoagulation use: None.                 -Rehabilitative:  Advised patient to continue with activities as tolerated     - Diagnostic:   - reviewed labs and imaging available  - Previously sent Request for imaging, office visit notes, and procedure notes from Miranda Jones MD ([facility] [facility] [facility] , [place] [address]: [phone]). Nothing received.    -referral: Urology for stress/urge incontinence     - Follow up:  4-6 weeks post procedure or as needed        - This condition does not require this patient to take time off of work, and the primary goal of our Pain Management  services is to improve the patient's functional capacity.      - I discussed the risks, benefits, and alternatives to potential treatment options. All questions and concerns were fully addressed today in clinic.     The above plan and management options were discussed at length with patient. Patient is in agreement with the above and verbalized understanding.      Michele Hernandez MD  Interventional Pain Management  [facility]    Disclaimer:  This note was prepared using voice recognition system and is likely to have sound alike errors that may have been overlooked even after proof reading.  Please call me with any questions

## 2024-02-06 NOTE — PRE-PROCEDURE INSTRUCTIONS
Spoke with patient regarding procedure scheduled on 2.20     Arrival time 0645     Has patient been sick with fever or on antibiotics within the last 7 days? No     Does the patient have any open wounds, sores or rashes? No     Does the patient have any recent fractures? no     Has patient received a vaccination within the last 7 days? No     Received the COVID vaccination? yes     Has the patient stopped all medications as directed? na     Does patient have a pacemaker, defibrillator, or implantable stimulator? No     Does the patient have a ride to and from procedure and someone reliable to remain with patient?  lianna     Is the patient diabetic? no     Does the patient have sleep apnea? Or use O2 at home? No and no     Is the patient receiving sedation? yes     Is the patient instructed to remain NPO beginning at midnight the night before their procedure? yes     Procedure location confirmed with patient? Yes     Covid- Denies signs/symptoms. Instructed to notify PAT/MD if any changes.

## 2024-02-20 ENCOUNTER — HOSPITAL ENCOUNTER (OUTPATIENT)
Facility: HOSPITAL | Age: 39
Discharge: HOME | End: 2024-02-20
Attending: PHYSICAL MEDICINE & REHABILITATION | Admitting: PHYSICAL MEDICINE & REHABILITATION
Payer: MEDICAID

## 2024-02-20 VITALS
OXYGEN SATURATION: 100 % | TEMPERATURE: 98 F | RESPIRATION RATE: 16 BRPM | SYSTOLIC BLOOD PRESSURE: 110 MMHG | HEIGHT: 64 IN | WEIGHT: 148.38 LBS | DIASTOLIC BLOOD PRESSURE: 76 MMHG | HEART RATE: 81 BPM | BODY MASS INDEX: 25.33 KG/M2

## 2024-02-20 DIAGNOSIS — M54.16 LUMBAR RADICULOPATHY, CHRONIC: Primary | ICD-10-CM

## 2024-02-20 DIAGNOSIS — M54.16 LUMBAR RADICULOPATHY: ICD-10-CM

## 2024-02-20 PROCEDURE — 62323 NJX INTERLAMINAR LMBR/SAC: CPT | Performed by: PHYSICAL MEDICINE & REHABILITATION

## 2024-02-20 PROCEDURE — 25500020 PHARM REV CODE 255: Performed by: PHYSICAL MEDICINE & REHABILITATION

## 2024-02-20 PROCEDURE — 62323 NJX INTERLAMINAR LMBR/SAC: CPT | Mod: ,,, | Performed by: PHYSICAL MEDICINE & REHABILITATION

## 2024-02-20 PROCEDURE — 63600175 PHARM REV CODE 636 W HCPCS: Performed by: PHYSICAL MEDICINE & REHABILITATION

## 2024-02-20 RX ORDER — FENTANYL CITRATE 50 UG/ML
INJECTION, SOLUTION INTRAMUSCULAR; INTRAVENOUS
Status: DISCONTINUED | OUTPATIENT
Start: 2024-02-20 | End: 2024-02-20 | Stop reason: HOSPADM

## 2024-02-20 RX ORDER — ONDANSETRON HYDROCHLORIDE 2 MG/ML
4 INJECTION, SOLUTION INTRAVENOUS ONCE AS NEEDED
Status: COMPLETED | OUTPATIENT
Start: 2024-02-20 | End: 2024-02-20

## 2024-02-20 RX ORDER — BUPIVACAINE HYDROCHLORIDE 2.5 MG/ML
INJECTION, SOLUTION EPIDURAL; INFILTRATION; INTRACAUDAL
Status: DISCONTINUED | OUTPATIENT
Start: 2024-02-20 | End: 2024-02-20 | Stop reason: HOSPADM

## 2024-02-20 RX ORDER — METHYLPREDNISOLONE ACETATE 80 MG/ML
INJECTION, SUSPENSION INTRA-ARTICULAR; INTRALESIONAL; INTRAMUSCULAR; SOFT TISSUE
Status: DISCONTINUED | OUTPATIENT
Start: 2024-02-20 | End: 2024-02-20 | Stop reason: HOSPADM

## 2024-02-20 RX ORDER — MIDAZOLAM HYDROCHLORIDE 1 MG/ML
INJECTION, SOLUTION INTRAMUSCULAR; INTRAVENOUS
Status: DISCONTINUED | OUTPATIENT
Start: 2024-02-20 | End: 2024-02-20 | Stop reason: HOSPADM

## 2024-02-20 NOTE — H&P
HPI  Patient presenting for Procedure(s) (LRB):  Lumbar L4-5 IL MIKE (N/A)       No health changes since previous encounter    Past Medical History:   Diagnosis Date    Back pain 07/07/2020    Breast mass     Bulging lumbar disc     L4 L5    Encounter for hepatitis C virus screening test for high risk patient 08/04/2020    Epilepsy     Hepatitis C antibody positive in blood     History of alcohol abuse     History of drug abuse     Migraine     Ovarian cyst     Scoliosis     Spine curvature, acquired     Spine disorder     Therapeutic opioid-induced constipation (OIC) 10/15/2020    Wellness examination 09/08/2022     Past Surgical History:   Procedure Laterality Date    EPIDURAL STEROID INJECTION      REPAIR OF HIATAL HERNIA USING MESH      ROBOT-ASSISTED SALPINGECTOMY N/A 12/15/2021    RALH/BS    ROBOTIC ASSISTED HYSTERECTOMY N/A 12/15/2021    RALH/BS- Uterine Fibroids; Pelvic pain; Menorrhagia    SELECTIVE INJECTION OF ANESTHETIC AGENT AROUND LUMBAR SPINAL NERVE ROOT BY TRANSFORAMINAL APPROACH Bilateral 9/28/2023    Procedure: Bilateral L4/5 TF MIKE;  Surgeon: Michele Hernandez MD;  Location: [facility];  Service: Pain Management;  Laterality: Bilateral;    SURGICAL REMOVAL OF MASS OF UPPER EXTREMITY      right breast     Review of patient's allergies indicates:   Allergen Reactions    Sulfa (sulfonamide antibiotics) Anaphylaxis, Hives, Itching and Other (See Comments)        No current facility-administered medications on file prior to encounter.     Current Outpatient Medications on File Prior to Encounter   Medication Sig Dispense Refill    gabapentin (NEURONTIN) 600 MG tablet Take 1 tablet (600 mg total) by mouth 3 (three) times daily. 90 tablet 11    linaCLOtide (LINZESS) 72 mcg Cap capsule Take 1 capsule (72 mcg total) by mouth before breakfast. 30 capsule 0    tiZANidine (ZANAFLEX) 4 MG tablet TAKE 1/2 TO 1 TABLET BY MOUTH THREE TIMES DAILY AS NEEDED FOR MUSCLE SPASMS; MAY CAUSE DROWSINESS 90 tablet 2     "topiramate (TOPAMAX) 200 MG Tab Take 1 tablet (200 mg total) by mouth every morning. 30 tablet 0    acetaminophen 325 mg Cap Tylenol      biotin 1 mg Cap Take by mouth.      melatonin 5 mg Cap Take 5 mg by mouth every evening.      multivit with minerals/lutein (MULTIVITAMIN 50 PLUS ORAL) multivitamin      ondansetron (ZOFRAN-ODT) 4 MG TbDL DISSOLVE 1 TABLET UNDER THE TONGUE EVERY 6 HOURS AS NEEDED FOR NAUSEA AND VOMITING 30 tablet 0    rimegepant (NURTEC) 75 mg odt Take 1 tablet (75 mg total) by mouth daily as needed for Migraine. 30 tablet 0        PMHx, PSHx, Allergies, Medications reviewed in epic    ROS negative except pain complaints in HPI    OBJECTIVE:    BP (!) 100/55 (BP Location: Right arm, Patient Position: Sitting)   Temp 97.2 °F (36.2 °C) (Temporal)   Resp 16   Ht 5' 4" (1.626 m)   Wt 67.3 kg (148 lb 5.9 oz)   LMP 12/02/2021 (Exact Date)   SpO2 100%   Breastfeeding No   BMI 25.47 kg/m²     PHYSICAL EXAMINATION:    GENERAL: Well appearing, in no acute distress, alert and oriented x3.  PSYCH:  Mood and affect appropriate.  SKIN: Skin color, texture, turgor normal, no rashes or lesions which will impact the procedure.  CV: RRR with palpation of the radial artery.  PULM: No evidence of respiratory difficulty, symmetric chest rise. Clear to auscultation.  NEURO: Cranial nerves grossly intact.    Plan:    Proceed with procedure as planned Procedure(s) (LRB):  Lumbar L4-5 IL MIKE (N/A)    Michele Hernandez MD  02/20/2024            "

## 2024-02-20 NOTE — DISCHARGE SUMMARY
Discharge Note  Short Stay      SUMMARY     Admit Date: 2/20/2024    Attending Physician: Michele Hernandez MD        Discharge Physician: Michele Hernandez MD        Discharge Date: 2/20/2024 8:09 AM    Procedure(s) (LRB):  Lumbar L4-5 IL MIKE (N/A)    Final Diagnosis: Lumbar radiculopathy, chronic [M54.16]    Disposition: Home or self care    Patient Instructions:   Current Discharge Medication List        CONTINUE these medications which have NOT CHANGED    Details   gabapentin (NEURONTIN) 600 MG tablet Take 1 tablet (600 mg total) by mouth 3 (three) times daily.  Qty: 90 tablet, Refills: 11      linaCLOtide (LINZESS) 72 mcg Cap capsule Take 1 capsule (72 mcg total) by mouth before breakfast.  Qty: 30 capsule, Refills: 0    Associated Diagnoses: Therapeutic opioid-induced constipation (OIC)      tiZANidine (ZANAFLEX) 4 MG tablet TAKE 1/2 TO 1 TABLET BY MOUTH THREE TIMES DAILY AS NEEDED FOR MUSCLE SPASMS; MAY CAUSE DROWSINESS  Qty: 90 tablet, Refills: 2    Associated Diagnoses: Lumbar muscle pain      topiramate (TOPAMAX) 200 MG Tab Take 1 tablet (200 mg total) by mouth every morning.  Qty: 30 tablet, Refills: 0      acetaminophen 325 mg Cap Tylenol      biotin 1 mg Cap Take by mouth.      melatonin 5 mg Cap Take 5 mg by mouth every evening.      multivit with minerals/lutein (MULTIVITAMIN 50 PLUS ORAL) multivitamin      ondansetron (ZOFRAN-ODT) 4 MG TbDL DISSOLVE 1 TABLET UNDER THE TONGUE EVERY 6 HOURS AS NEEDED FOR NAUSEA AND VOMITING  Qty: 30 tablet, Refills: 0      rimegepant (NURTEC) 75 mg odt Take 1 tablet (75 mg total) by mouth daily as needed for Migraine.  Qty: 30 tablet, Refills: 0                 Discharge Diagnosis: Lumbar radiculopathy, chronic [M54.16]  Condition on Discharge: Stable with no complications to procedure   Diet on Discharge: Same as before.  Activity: as per instruction sheet.  Discharge to: Home with a responsible adult.  Follow up: 2-4 weeks       Please call the office at [phone] if you experience any weakness or loss of sensation, fever > 101.5, pain uncontrolled with oral medications, persistent nausea/vomiting/or diarrhea, redness or drainage from the incisions, or any other worrisome concerns. If physician on call was not reached or could not communicate with our office for any reason please go to the nearest emergency department

## 2024-02-20 NOTE — DISCHARGE INSTRUCTIONS

## 2024-03-01 ENCOUNTER — PATIENT MESSAGE (OUTPATIENT)
Dept: PAIN MEDICINE | Facility: CLINIC | Age: 39
End: 2024-03-01
Payer: MEDICAID

## 2024-04-26 DIAGNOSIS — M79.18 LUMBAR MUSCLE PAIN: ICD-10-CM

## 2024-04-26 NOTE — TELEPHONE ENCOUNTER
Can you refill? Tizanidine 4MG    Last Visit: 02/05/24  Next Visit: none  Last refill: 01/18/24  How patient is currently taking medication requested: 1/2 to 1 tab PO TID PRN  Pharmacy: [facility] #[id number] - [place]

## 2024-04-29 RX ORDER — TIZANIDINE 4 MG/1
TABLET ORAL
Qty: 90 TABLET | Refills: 2 | Status: SHIPPED | OUTPATIENT
Start: 2024-04-29

## 2024-05-01 ENCOUNTER — TELEPHONE (OUTPATIENT)
Dept: PAIN MEDICINE | Facility: CLINIC | Age: 39
End: 2024-05-01
Payer: MEDICAID

## 2024-05-01 NOTE — TELEPHONE ENCOUNTER
----- Message from Chago Parker sent at 5/1/2024 12:32 PM CDT -----  Contact: [phone]  Patient needs first available appointment due to inj f/u. Please call patient at [phone] . Thanks KB

## 2024-12-17 DIAGNOSIS — M79.18 LUMBAR MUSCLE PAIN: ICD-10-CM

## 2024-12-18 RX ORDER — TIZANIDINE 4 MG/1
TABLET ORAL
Qty: 90 TABLET | Refills: 2 | Status: SHIPPED | OUTPATIENT
Start: 2024-12-18

## 2024-12-18 NOTE — TELEPHONE ENCOUNTER
Good Morning/Afternoon,     Pt is requesting for a refill of: zanaflex 4mg  Last filed:04/29/2024  Last encounter:02/05/2024  Up coming apt: n/a  Pharmacy: [facility] #[id number] - [place], [place] - [address] [facility] [address]      Medical Assistant  Shweta HICKS ([facility])

## 2025-01-15 NOTE — TELEPHONE ENCOUNTER
Patient aware will notified MD Mendez       ----- Message from Marisel Bhatti sent at 7/14/2020  9:45 AM CDT -----  Contact: [phone]  Pt will be 10min late, please call [phone]       - - -

## 2025-05-12 DIAGNOSIS — M79.18 LUMBAR MUSCLE PAIN: ICD-10-CM

## 2025-05-12 RX ORDER — TIZANIDINE 4 MG/1
TABLET ORAL
Qty: 90 TABLET | Refills: 2 | Status: SHIPPED | OUTPATIENT
Start: 2025-05-12

## 2025-05-12 NOTE — TELEPHONE ENCOUNTER
Good Morning/Afternoon,     Pt is requesting for a refill of: tiZANidine (ZANAFLEX) 4 MG tablet   Last filed: 12/18/2024  Last encounter: 02/05/2024  Up coming apt: PRN  Pharmacy: [facility] #[id number] - [place], [place] - [address] AT [facility] [address]     Medical Assistant  Shweta HICKS (CCMA, Interventional Pain Management Surgery Scheduler)   
Additional Aftercare Appointment